# Patient Record
Sex: MALE | Race: WHITE | NOT HISPANIC OR LATINO | Employment: FULL TIME | URBAN - METROPOLITAN AREA
[De-identification: names, ages, dates, MRNs, and addresses within clinical notes are randomized per-mention and may not be internally consistent; named-entity substitution may affect disease eponyms.]

---

## 2017-05-12 ENCOUNTER — HOSPITAL ENCOUNTER (OUTPATIENT)
Facility: HOSPITAL | Age: 42
Setting detail: OBSERVATION
Discharge: HOME | End: 2017-05-15
Attending: EMERGENCY MEDICINE | Admitting: HOSPITALIST
Payer: COMMERCIAL

## 2017-05-12 DIAGNOSIS — K86.3 PANCREATIC PSEUDOCYST: ICD-10-CM

## 2017-05-12 DIAGNOSIS — R10.13 EPIGASTRIC ABDOMINAL PAIN: Primary | ICD-10-CM

## 2017-05-12 DIAGNOSIS — R16.0 LIVER MASS: ICD-10-CM

## 2017-05-13 ENCOUNTER — APPOINTMENT (EMERGENCY)
Dept: RADIOLOGY | Facility: HOSPITAL | Age: 42
End: 2017-05-13
Payer: COMMERCIAL

## 2017-05-13 PROBLEM — K86.0 CHRONIC ALCOHOLIC PANCREATITIS (HCC): Status: ACTIVE | Noted: 2017-05-13

## 2017-05-13 PROBLEM — K86.3 PANCREATIC PSEUDOCYST: Status: ACTIVE | Noted: 2017-05-13

## 2017-05-13 PROBLEM — R10.9 ABDOMINAL PAIN: Status: ACTIVE | Noted: 2017-05-13

## 2017-05-13 PROBLEM — R16.0 LIVER MASS: Status: ACTIVE | Noted: 2017-05-13

## 2017-05-13 LAB
ALBUMIN SERPL BCP-MCNC: 4.1 G/DL (ref 3.5–5)
ALP SERPL-CCNC: 111 U/L (ref 46–116)
ALT SERPL W P-5'-P-CCNC: 80 U/L (ref 12–78)
ANION GAP SERPL CALCULATED.3IONS-SCNC: 5 MMOL/L (ref 4–13)
ANION GAP SERPL CALCULATED.3IONS-SCNC: 8 MMOL/L (ref 4–13)
AST SERPL W P-5'-P-CCNC: 31 U/L (ref 5–45)
BASOPHILS # BLD AUTO: 0.03 THOUSANDS/ΜL (ref 0–0.1)
BASOPHILS NFR BLD AUTO: 1 % (ref 0–1)
BILIRUB SERPL-MCNC: 0.31 MG/DL (ref 0.2–1)
BUN SERPL-MCNC: 14 MG/DL (ref 5–25)
BUN SERPL-MCNC: 17 MG/DL (ref 5–25)
CALCIUM SERPL-MCNC: 8.9 MG/DL (ref 8.3–10.1)
CALCIUM SERPL-MCNC: 9.2 MG/DL (ref 8.3–10.1)
CHLORIDE SERPL-SCNC: 104 MMOL/L (ref 100–108)
CHLORIDE SERPL-SCNC: 106 MMOL/L (ref 100–108)
CO2 SERPL-SCNC: 26 MMOL/L (ref 21–32)
CO2 SERPL-SCNC: 30 MMOL/L (ref 21–32)
CREAT SERPL-MCNC: 0.79 MG/DL (ref 0.6–1.3)
CREAT SERPL-MCNC: 0.9 MG/DL (ref 0.6–1.3)
EOSINOPHIL # BLD AUTO: 0.41 THOUSAND/ΜL (ref 0–0.61)
EOSINOPHIL NFR BLD AUTO: 7 % (ref 0–6)
ERYTHROCYTE [DISTWIDTH] IN BLOOD BY AUTOMATED COUNT: 13.2 % (ref 11.6–15.1)
ERYTHROCYTE [DISTWIDTH] IN BLOOD BY AUTOMATED COUNT: 13.3 % (ref 11.6–15.1)
GFR SERPL CREATININE-BSD FRML MDRD: >60 ML/MIN/1.73SQ M
GFR SERPL CREATININE-BSD FRML MDRD: >60 ML/MIN/1.73SQ M
GLUCOSE P FAST SERPL-MCNC: 106 MG/DL (ref 65–99)
GLUCOSE SERPL-MCNC: 106 MG/DL (ref 65–140)
GLUCOSE SERPL-MCNC: 117 MG/DL (ref 65–140)
HCT VFR BLD AUTO: 43.7 % (ref 36.5–49.3)
HCT VFR BLD AUTO: 46 % (ref 36.5–49.3)
HGB BLD-MCNC: 14.5 G/DL (ref 12–17)
HGB BLD-MCNC: 15.5 G/DL (ref 12–17)
LIPASE SERPL-CCNC: 86 U/L (ref 73–393)
LYMPHOCYTES # BLD AUTO: 1.15 THOUSANDS/ΜL (ref 0.6–4.47)
LYMPHOCYTES NFR BLD AUTO: 20 % (ref 14–44)
MAGNESIUM SERPL-MCNC: 2.2 MG/DL (ref 1.6–2.6)
MCH RBC QN AUTO: 26.9 PG (ref 26.8–34.3)
MCH RBC QN AUTO: 27.5 PG (ref 26.8–34.3)
MCHC RBC AUTO-ENTMCNC: 33.2 G/DL (ref 31.4–37.4)
MCHC RBC AUTO-ENTMCNC: 33.7 G/DL (ref 31.4–37.4)
MCV RBC AUTO: 81 FL (ref 82–98)
MCV RBC AUTO: 82 FL (ref 82–98)
MONOCYTES # BLD AUTO: 0.41 THOUSAND/ΜL (ref 0.17–1.22)
MONOCYTES NFR BLD AUTO: 7 % (ref 4–12)
NEUTROPHILS # BLD AUTO: 3.61 THOUSANDS/ΜL (ref 1.85–7.62)
NEUTS SEG NFR BLD AUTO: 65 % (ref 43–75)
NRBC BLD AUTO-RTO: 0 /100 WBCS
PHOSPHATE SERPL-MCNC: 3.1 MG/DL (ref 2.7–4.5)
PLATELET # BLD AUTO: 162 THOUSANDS/UL (ref 149–390)
PLATELET # BLD AUTO: 163 THOUSANDS/UL (ref 149–390)
PLATELET # BLD AUTO: 175 THOUSANDS/UL (ref 149–390)
PMV BLD AUTO: 10.2 FL (ref 8.9–12.7)
PMV BLD AUTO: 10.6 FL (ref 8.9–12.7)
PMV BLD AUTO: 10.9 FL (ref 8.9–12.7)
POTASSIUM SERPL-SCNC: 3.9 MMOL/L (ref 3.5–5.3)
POTASSIUM SERPL-SCNC: 4.3 MMOL/L (ref 3.5–5.3)
PROT SERPL-MCNC: 7.8 G/DL (ref 6.4–8.2)
RBC # BLD AUTO: 5.4 MILLION/UL (ref 3.88–5.62)
RBC # BLD AUTO: 5.64 MILLION/UL (ref 3.88–5.62)
SODIUM SERPL-SCNC: 139 MMOL/L (ref 136–145)
SODIUM SERPL-SCNC: 140 MMOL/L (ref 136–145)
WBC # BLD AUTO: 4.92 THOUSAND/UL (ref 4.31–10.16)
WBC # BLD AUTO: 5.64 THOUSAND/UL (ref 4.31–10.16)

## 2017-05-13 PROCEDURE — 86301 IMMUNOASSAY TUMOR CA 19-9: CPT | Performed by: HOSPITALIST

## 2017-05-13 PROCEDURE — A9270 NON-COVERED ITEM OR SERVICE: HCPCS | Performed by: EMERGENCY MEDICINE

## 2017-05-13 PROCEDURE — 36415 COLL VENOUS BLD VENIPUNCTURE: CPT | Performed by: EMERGENCY MEDICINE

## 2017-05-13 PROCEDURE — 83690 ASSAY OF LIPASE: CPT | Performed by: EMERGENCY MEDICINE

## 2017-05-13 PROCEDURE — 83735 ASSAY OF MAGNESIUM: CPT | Performed by: HOSPITALIST

## 2017-05-13 PROCEDURE — A9270 NON-COVERED ITEM OR SERVICE: HCPCS | Performed by: HOSPITALIST

## 2017-05-13 PROCEDURE — 74177 CT ABD & PELVIS W/CONTRAST: CPT

## 2017-05-13 PROCEDURE — 85049 AUTOMATED PLATELET COUNT: CPT | Performed by: HOSPITALIST

## 2017-05-13 PROCEDURE — 94760 N-INVAS EAR/PLS OXIMETRY 1: CPT

## 2017-05-13 PROCEDURE — 85027 COMPLETE CBC AUTOMATED: CPT | Performed by: HOSPITALIST

## 2017-05-13 PROCEDURE — C9113 INJ PANTOPRAZOLE SODIUM, VIA: HCPCS | Performed by: HOSPITALIST

## 2017-05-13 PROCEDURE — 96360 HYDRATION IV INFUSION INIT: CPT

## 2017-05-13 PROCEDURE — 85025 COMPLETE CBC W/AUTO DIFF WBC: CPT | Performed by: EMERGENCY MEDICINE

## 2017-05-13 PROCEDURE — 84100 ASSAY OF PHOSPHORUS: CPT | Performed by: HOSPITALIST

## 2017-05-13 PROCEDURE — 80048 BASIC METABOLIC PNL TOTAL CA: CPT | Performed by: HOSPITALIST

## 2017-05-13 PROCEDURE — 80053 COMPREHEN METABOLIC PANEL: CPT | Performed by: EMERGENCY MEDICINE

## 2017-05-13 PROCEDURE — 99285 EMERGENCY DEPT VISIT HI MDM: CPT

## 2017-05-13 RX ORDER — MAGNESIUM HYDROXIDE/ALUMINUM HYDROXICE/SIMETHICONE 120; 1200; 1200 MG/30ML; MG/30ML; MG/30ML
30 SUSPENSION ORAL ONCE
Status: COMPLETED | OUTPATIENT
Start: 2017-05-13 | End: 2017-05-13

## 2017-05-13 RX ORDER — DOCUSATE SODIUM 100 MG/1
100 CAPSULE, LIQUID FILLED ORAL 2 TIMES DAILY
Status: DISCONTINUED | OUTPATIENT
Start: 2017-05-13 | End: 2017-05-15 | Stop reason: HOSPADM

## 2017-05-13 RX ORDER — SODIUM CHLORIDE 9 MG/ML
90 INJECTION, SOLUTION INTRAVENOUS CONTINUOUS
Status: DISCONTINUED | OUTPATIENT
Start: 2017-05-13 | End: 2017-05-14

## 2017-05-13 RX ORDER — ACETAMINOPHEN 325 MG/1
650 TABLET ORAL EVERY 6 HOURS PRN
Status: DISCONTINUED | OUTPATIENT
Start: 2017-05-13 | End: 2017-05-15 | Stop reason: HOSPADM

## 2017-05-13 RX ORDER — PANTOPRAZOLE SODIUM 40 MG/1
40 INJECTION, POWDER, FOR SOLUTION INTRAVENOUS EVERY 12 HOURS SCHEDULED
Status: DISCONTINUED | OUTPATIENT
Start: 2017-05-13 | End: 2017-05-15 | Stop reason: HOSPADM

## 2017-05-13 RX ORDER — SENNOSIDES 8.6 MG
1 TABLET ORAL DAILY
Status: DISCONTINUED | OUTPATIENT
Start: 2017-05-13 | End: 2017-05-15 | Stop reason: HOSPADM

## 2017-05-13 RX ADMIN — ALUMINUM HYDROXIDE, MAGNESIUM HYDROXIDE, AND SIMETHICONE 30 ML: 200; 200; 20 SUSPENSION ORAL at 02:13

## 2017-05-13 RX ADMIN — HYDROMORPHONE HYDROCHLORIDE 0.5 MG: 1 INJECTION, SOLUTION INTRAMUSCULAR; INTRAVENOUS; SUBCUTANEOUS at 19:36

## 2017-05-13 RX ADMIN — DOCUSATE SODIUM 100 MG: 100 CAPSULE, LIQUID FILLED ORAL at 09:19

## 2017-05-13 RX ADMIN — HYDROMORPHONE HYDROCHLORIDE 0.5 MG: 1 INJECTION, SOLUTION INTRAMUSCULAR; INTRAVENOUS; SUBCUTANEOUS at 13:27

## 2017-05-13 RX ADMIN — PANTOPRAZOLE SODIUM 40 MG: 40 INJECTION, POWDER, FOR SOLUTION INTRAVENOUS at 22:07

## 2017-05-13 RX ADMIN — ENOXAPARIN SODIUM 40 MG: 40 INJECTION SUBCUTANEOUS at 09:19

## 2017-05-13 RX ADMIN — SODIUM CHLORIDE 90 ML/HR: 0.9 INJECTION, SOLUTION INTRAVENOUS at 06:27

## 2017-05-13 RX ADMIN — SODIUM CHLORIDE 90 ML/HR: 0.9 INJECTION, SOLUTION INTRAVENOUS at 17:05

## 2017-05-13 RX ADMIN — PANTOPRAZOLE SODIUM 40 MG: 40 INJECTION, POWDER, FOR SOLUTION INTRAVENOUS at 09:19

## 2017-05-13 RX ADMIN — DOCUSATE SODIUM 100 MG: 100 CAPSULE, LIQUID FILLED ORAL at 17:56

## 2017-05-13 RX ADMIN — LIDOCAINE HYDROCHLORIDE 10 ML: 20 SOLUTION ORAL; TOPICAL at 02:13

## 2017-05-13 RX ADMIN — IOHEXOL 100 ML: 350 INJECTION, SOLUTION INTRAVENOUS at 01:25

## 2017-05-13 RX ADMIN — HYDROMORPHONE HYDROCHLORIDE 0.5 MG: 1 INJECTION, SOLUTION INTRAMUSCULAR; INTRAVENOUS; SUBCUTANEOUS at 07:48

## 2017-05-13 RX ADMIN — SENNOSIDES 8.6 MG: 8.6 TABLET, FILM COATED ORAL at 09:19

## 2017-05-13 RX ADMIN — HYDROMORPHONE HYDROCHLORIDE 0.5 MG: 1 INJECTION, SOLUTION INTRAMUSCULAR; INTRAVENOUS; SUBCUTANEOUS at 23:48

## 2017-05-13 RX ADMIN — SODIUM CHLORIDE 1000 ML: 0.9 INJECTION, SOLUTION INTRAVENOUS at 00:21

## 2017-05-14 LAB
ALBUMIN SERPL BCP-MCNC: 3.5 G/DL (ref 3.5–5)
ALP SERPL-CCNC: 98 U/L (ref 46–116)
ALT SERPL W P-5'-P-CCNC: 84 U/L (ref 12–78)
ANION GAP SERPL CALCULATED.3IONS-SCNC: 6 MMOL/L (ref 4–13)
AST SERPL W P-5'-P-CCNC: 32 U/L (ref 5–45)
BASOPHILS # BLD AUTO: 0.01 THOUSANDS/ΜL (ref 0–0.1)
BASOPHILS NFR BLD AUTO: 0 % (ref 0–1)
BILIRUB SERPL-MCNC: 0.29 MG/DL (ref 0.2–1)
BUN SERPL-MCNC: 11 MG/DL (ref 5–25)
CALCIUM SERPL-MCNC: 8.5 MG/DL (ref 8.3–10.1)
CANCER AG19-9 SERPL-ACNC: 13 U/ML (ref 0–35)
CHLORIDE SERPL-SCNC: 106 MMOL/L (ref 100–108)
CO2 SERPL-SCNC: 28 MMOL/L (ref 21–32)
CREAT SERPL-MCNC: 0.8 MG/DL (ref 0.6–1.3)
EOSINOPHIL # BLD AUTO: 0.33 THOUSAND/ΜL (ref 0–0.61)
EOSINOPHIL NFR BLD AUTO: 8 % (ref 0–6)
ERYTHROCYTE [DISTWIDTH] IN BLOOD BY AUTOMATED COUNT: 13.1 % (ref 11.6–15.1)
GFR SERPL CREATININE-BSD FRML MDRD: >60 ML/MIN/1.73SQ M
GLUCOSE SERPL-MCNC: 142 MG/DL (ref 65–140)
HAV IGM SER QL: NORMAL
HBV CORE IGM SER QL: NORMAL
HBV SURFACE AG SER QL: NORMAL
HCT VFR BLD AUTO: 42.2 % (ref 36.5–49.3)
HCV AB SER QL: NORMAL
HGB BLD-MCNC: 14.1 G/DL (ref 12–17)
LIPASE SERPL-CCNC: 77 U/L (ref 73–393)
LYMPHOCYTES # BLD AUTO: 1.04 THOUSANDS/ΜL (ref 0.6–4.47)
LYMPHOCYTES NFR BLD AUTO: 25 % (ref 14–44)
MCH RBC QN AUTO: 27.3 PG (ref 26.8–34.3)
MCHC RBC AUTO-ENTMCNC: 33.4 G/DL (ref 31.4–37.4)
MCV RBC AUTO: 82 FL (ref 82–98)
MONOCYTES # BLD AUTO: 0.35 THOUSAND/ΜL (ref 0.17–1.22)
MONOCYTES NFR BLD AUTO: 8 % (ref 4–12)
NEUTROPHILS # BLD AUTO: 2.44 THOUSANDS/ΜL (ref 1.85–7.62)
NEUTS SEG NFR BLD AUTO: 59 % (ref 43–75)
NRBC BLD AUTO-RTO: 0 /100 WBCS
PLATELET # BLD AUTO: 152 THOUSANDS/UL (ref 149–390)
PMV BLD AUTO: 10.8 FL (ref 8.9–12.7)
POTASSIUM SERPL-SCNC: 3.9 MMOL/L (ref 3.5–5.3)
PROT SERPL-MCNC: 6.7 G/DL (ref 6.4–8.2)
RBC # BLD AUTO: 5.17 MILLION/UL (ref 3.88–5.62)
SODIUM SERPL-SCNC: 140 MMOL/L (ref 136–145)
WBC # BLD AUTO: 4.19 THOUSAND/UL (ref 4.31–10.16)

## 2017-05-14 PROCEDURE — 80053 COMPREHEN METABOLIC PANEL: CPT | Performed by: INTERNAL MEDICINE

## 2017-05-14 PROCEDURE — 85025 COMPLETE CBC W/AUTO DIFF WBC: CPT | Performed by: INTERNAL MEDICINE

## 2017-05-14 PROCEDURE — 82105 ALPHA-FETOPROTEIN SERUM: CPT | Performed by: PHYSICIAN ASSISTANT

## 2017-05-14 PROCEDURE — A9270 NON-COVERED ITEM OR SERVICE: HCPCS | Performed by: HOSPITALIST

## 2017-05-14 PROCEDURE — 80074 ACUTE HEPATITIS PANEL: CPT | Performed by: PHYSICIAN ASSISTANT

## 2017-05-14 PROCEDURE — 83690 ASSAY OF LIPASE: CPT | Performed by: INTERNAL MEDICINE

## 2017-05-14 PROCEDURE — A9270 NON-COVERED ITEM OR SERVICE: HCPCS | Performed by: PHYSICIAN ASSISTANT

## 2017-05-14 PROCEDURE — C9113 INJ PANTOPRAZOLE SODIUM, VIA: HCPCS | Performed by: HOSPITALIST

## 2017-05-14 RX ORDER — OXYCODONE HYDROCHLORIDE 5 MG/1
5 TABLET ORAL EVERY 4 HOURS PRN
Status: DISCONTINUED | OUTPATIENT
Start: 2017-05-14 | End: 2017-05-15 | Stop reason: HOSPADM

## 2017-05-14 RX ORDER — OXYCODONE HYDROCHLORIDE 10 MG/1
10 TABLET ORAL EVERY 4 HOURS PRN
Status: DISCONTINUED | OUTPATIENT
Start: 2017-05-14 | End: 2017-05-15 | Stop reason: HOSPADM

## 2017-05-14 RX ADMIN — HYDROMORPHONE HYDROCHLORIDE 0.5 MG: 1 INJECTION, SOLUTION INTRAMUSCULAR; INTRAVENOUS; SUBCUTANEOUS at 08:26

## 2017-05-14 RX ADMIN — HYDROMORPHONE HYDROCHLORIDE 0.5 MG: 1 INJECTION, SOLUTION INTRAMUSCULAR; INTRAVENOUS; SUBCUTANEOUS at 04:28

## 2017-05-14 RX ADMIN — SENNOSIDES 8.6 MG: 8.6 TABLET, FILM COATED ORAL at 08:25

## 2017-05-14 RX ADMIN — DOCUSATE SODIUM 100 MG: 100 CAPSULE, LIQUID FILLED ORAL at 08:25

## 2017-05-14 RX ADMIN — ENOXAPARIN SODIUM 40 MG: 40 INJECTION SUBCUTANEOUS at 08:25

## 2017-05-14 RX ADMIN — OXYCODONE HYDROCHLORIDE 10 MG: 10 TABLET ORAL at 12:20

## 2017-05-14 RX ADMIN — PANTOPRAZOLE SODIUM 40 MG: 40 INJECTION, POWDER, FOR SOLUTION INTRAVENOUS at 20:30

## 2017-05-14 RX ADMIN — PANTOPRAZOLE SODIUM 40 MG: 40 INJECTION, POWDER, FOR SOLUTION INTRAVENOUS at 08:31

## 2017-05-14 RX ADMIN — OXYCODONE HYDROCHLORIDE 10 MG: 10 TABLET ORAL at 17:08

## 2017-05-15 VITALS
SYSTOLIC BLOOD PRESSURE: 129 MMHG | TEMPERATURE: 98.2 F | HEART RATE: 80 BPM | WEIGHT: 210.32 LBS | OXYGEN SATURATION: 92 % | BODY MASS INDEX: 30.11 KG/M2 | HEIGHT: 70 IN | DIASTOLIC BLOOD PRESSURE: 56 MMHG | RESPIRATION RATE: 18 BRPM

## 2017-05-15 LAB
ALBUMIN SERPL BCP-MCNC: 3.8 G/DL (ref 3.5–5)
ALP SERPL-CCNC: 168 U/L (ref 46–116)
ALT SERPL W P-5'-P-CCNC: 253 U/L (ref 12–78)
ANION GAP SERPL CALCULATED.3IONS-SCNC: 8 MMOL/L (ref 4–13)
AST SERPL W P-5'-P-CCNC: 184 U/L (ref 5–45)
BILIRUB SERPL-MCNC: 0.76 MG/DL (ref 0.2–1)
BUN SERPL-MCNC: 13 MG/DL (ref 5–25)
CALCIUM SERPL-MCNC: 8.8 MG/DL (ref 8.3–10.1)
CHLORIDE SERPL-SCNC: 106 MMOL/L (ref 100–108)
CO2 SERPL-SCNC: 27 MMOL/L (ref 21–32)
CREAT SERPL-MCNC: 1.06 MG/DL (ref 0.6–1.3)
GFR SERPL CREATININE-BSD FRML MDRD: >60 ML/MIN/1.73SQ M
GLUCOSE P FAST SERPL-MCNC: 108 MG/DL (ref 65–99)
GLUCOSE SERPL-MCNC: 108 MG/DL (ref 65–140)
POTASSIUM SERPL-SCNC: 4 MMOL/L (ref 3.5–5.3)
PROT SERPL-MCNC: 7.3 G/DL (ref 6.4–8.2)
SODIUM SERPL-SCNC: 141 MMOL/L (ref 136–145)

## 2017-05-15 PROCEDURE — C9113 INJ PANTOPRAZOLE SODIUM, VIA: HCPCS | Performed by: HOSPITALIST

## 2017-05-15 PROCEDURE — A9270 NON-COVERED ITEM OR SERVICE: HCPCS | Performed by: PHYSICIAN ASSISTANT

## 2017-05-15 PROCEDURE — 80053 COMPREHEN METABOLIC PANEL: CPT | Performed by: PHYSICIAN ASSISTANT

## 2017-05-15 RX ORDER — ONDANSETRON 4 MG/1
4 TABLET, FILM COATED ORAL EVERY 8 HOURS PRN
Qty: 15 TABLET | Refills: 0 | Status: SHIPPED | OUTPATIENT
Start: 2017-05-15 | End: 2017-05-29

## 2017-05-15 RX ORDER — OXYCODONE HYDROCHLORIDE 5 MG/1
5 TABLET ORAL EVERY 6 HOURS PRN
Qty: 12 TABLET | Refills: 0 | Status: SHIPPED | OUTPATIENT
Start: 2017-05-15 | End: 2017-05-29

## 2017-05-15 RX ADMIN — OXYCODONE HYDROCHLORIDE 10 MG: 10 TABLET ORAL at 00:02

## 2017-05-15 RX ADMIN — ENOXAPARIN SODIUM 40 MG: 40 INJECTION SUBCUTANEOUS at 09:00

## 2017-05-15 RX ADMIN — OXYCODONE HYDROCHLORIDE 10 MG: 10 TABLET ORAL at 09:00

## 2017-05-15 RX ADMIN — PANTOPRAZOLE SODIUM 40 MG: 40 INJECTION, POWDER, FOR SOLUTION INTRAVENOUS at 07:44

## 2017-05-16 LAB — AFP-TM SERPL-MCNC: 4.6 NG/ML (ref 0–8.3)

## 2017-05-29 ENCOUNTER — HOSPITAL ENCOUNTER (EMERGENCY)
Facility: HOSPITAL | Age: 42
Discharge: HOME | End: 2017-05-29
Attending: EMERGENCY MEDICINE | Admitting: EMERGENCY MEDICINE
Payer: COMMERCIAL

## 2017-05-29 VITALS
WEIGHT: 205 LBS | BODY MASS INDEX: 29.41 KG/M2 | SYSTOLIC BLOOD PRESSURE: 151 MMHG | DIASTOLIC BLOOD PRESSURE: 102 MMHG | HEART RATE: 85 BPM | RESPIRATION RATE: 20 BRPM | TEMPERATURE: 97.2 F | OXYGEN SATURATION: 97 %

## 2017-05-29 DIAGNOSIS — R10.9 ABDOMINAL PAIN: Primary | ICD-10-CM

## 2017-05-29 DIAGNOSIS — K86.3 PANCREATIC PSEUDOCYST: ICD-10-CM

## 2017-05-29 LAB
ALBUMIN SERPL BCP-MCNC: 4.2 G/DL (ref 3.5–5)
ALP SERPL-CCNC: 118 U/L (ref 46–116)
ALT SERPL W P-5'-P-CCNC: 100 U/L (ref 12–78)
ANION GAP SERPL CALCULATED.3IONS-SCNC: 6 MMOL/L (ref 4–13)
AST SERPL W P-5'-P-CCNC: 36 U/L (ref 5–45)
BASOPHILS # BLD AUTO: 0.04 THOUSANDS/ΜL (ref 0–0.1)
BASOPHILS NFR BLD AUTO: 1 % (ref 0–1)
BILIRUB SERPL-MCNC: 0.36 MG/DL (ref 0.2–1)
BUN SERPL-MCNC: 9 MG/DL (ref 5–25)
CALCIUM SERPL-MCNC: 9.6 MG/DL (ref 8.3–10.1)
CHLORIDE SERPL-SCNC: 105 MMOL/L (ref 100–108)
CO2 SERPL-SCNC: 30 MMOL/L (ref 21–32)
CREAT SERPL-MCNC: 1.04 MG/DL (ref 0.6–1.3)
EOSINOPHIL # BLD AUTO: 0.37 THOUSAND/ΜL (ref 0–0.61)
EOSINOPHIL NFR BLD AUTO: 7 % (ref 0–6)
ERYTHROCYTE [DISTWIDTH] IN BLOOD BY AUTOMATED COUNT: 13 % (ref 11.6–15.1)
GFR SERPL CREATININE-BSD FRML MDRD: >60 ML/MIN/1.73SQ M
GLUCOSE SERPL-MCNC: 112 MG/DL (ref 65–140)
HCT VFR BLD AUTO: 44.7 % (ref 36.5–49.3)
HGB BLD-MCNC: 15.2 G/DL (ref 12–17)
LIPASE SERPL-CCNC: 159 U/L (ref 73–393)
LYMPHOCYTES # BLD AUTO: 1.11 THOUSANDS/ΜL (ref 0.6–4.47)
LYMPHOCYTES NFR BLD AUTO: 20 % (ref 14–44)
MCH RBC QN AUTO: 27.5 PG (ref 26.8–34.3)
MCHC RBC AUTO-ENTMCNC: 34 G/DL (ref 31.4–37.4)
MCV RBC AUTO: 81 FL (ref 82–98)
MONOCYTES # BLD AUTO: 0.53 THOUSAND/ΜL (ref 0.17–1.22)
MONOCYTES NFR BLD AUTO: 9 % (ref 4–12)
NEUTROPHILS # BLD AUTO: 3.61 THOUSANDS/ΜL (ref 1.85–7.62)
NEUTS SEG NFR BLD AUTO: 63 % (ref 43–75)
NRBC BLD AUTO-RTO: 0 /100 WBCS
PLATELET # BLD AUTO: 163 THOUSANDS/UL (ref 149–390)
PMV BLD AUTO: 11 FL (ref 8.9–12.7)
POTASSIUM SERPL-SCNC: 3.5 MMOL/L (ref 3.5–5.3)
PROT SERPL-MCNC: 8 G/DL (ref 6.4–8.2)
RBC # BLD AUTO: 5.52 MILLION/UL (ref 3.88–5.62)
SODIUM SERPL-SCNC: 141 MMOL/L (ref 136–145)
WBC # BLD AUTO: 5.69 THOUSAND/UL (ref 4.31–10.16)

## 2017-05-29 PROCEDURE — 85025 COMPLETE CBC W/AUTO DIFF WBC: CPT

## 2017-05-29 PROCEDURE — 96375 TX/PRO/DX INJ NEW DRUG ADDON: CPT

## 2017-05-29 PROCEDURE — 36415 COLL VENOUS BLD VENIPUNCTURE: CPT

## 2017-05-29 PROCEDURE — 99284 EMERGENCY DEPT VISIT MOD MDM: CPT

## 2017-05-29 PROCEDURE — A9270 NON-COVERED ITEM OR SERVICE: HCPCS | Performed by: EMERGENCY MEDICINE

## 2017-05-29 PROCEDURE — 96374 THER/PROPH/DIAG INJ IV PUSH: CPT

## 2017-05-29 PROCEDURE — 83690 ASSAY OF LIPASE: CPT | Performed by: EMERGENCY MEDICINE

## 2017-05-29 PROCEDURE — 80053 COMPREHEN METABOLIC PANEL: CPT

## 2017-05-29 RX ORDER — MAGNESIUM HYDROXIDE/ALUMINUM HYDROXICE/SIMETHICONE 120; 1200; 1200 MG/30ML; MG/30ML; MG/30ML
30 SUSPENSION ORAL ONCE
Status: COMPLETED | OUTPATIENT
Start: 2017-05-29 | End: 2017-05-29

## 2017-05-29 RX ORDER — OXYCODONE HYDROCHLORIDE 5 MG/1
5 TABLET ORAL EVERY 4 HOURS PRN
Qty: 6 TABLET | Refills: 0 | Status: SHIPPED | OUTPATIENT
Start: 2017-05-29 | End: 2017-07-03

## 2017-05-29 RX ORDER — ONDANSETRON 2 MG/ML
4 INJECTION INTRAMUSCULAR; INTRAVENOUS ONCE AS NEEDED
Status: COMPLETED | OUTPATIENT
Start: 2017-05-29 | End: 2017-05-29

## 2017-05-29 RX ADMIN — HYDROMORPHONE HYDROCHLORIDE 0.5 MG: 1 INJECTION, SOLUTION INTRAMUSCULAR; INTRAVENOUS; SUBCUTANEOUS at 22:36

## 2017-05-29 RX ADMIN — LIDOCAINE HYDROCHLORIDE 15 ML: 20 SOLUTION ORAL; TOPICAL at 22:21

## 2017-05-29 RX ADMIN — ONDANSETRON 4 MG: 2 INJECTION INTRAMUSCULAR; INTRAVENOUS at 21:56

## 2017-05-29 RX ADMIN — ALUMINUM HYDROXIDE, MAGNESIUM HYDROXIDE, AND SIMETHICONE 30 ML: 200; 200; 20 SUSPENSION ORAL at 22:21

## 2017-05-30 ENCOUNTER — ALLSCRIPTS OFFICE VISIT (OUTPATIENT)
Dept: OTHER | Facility: OTHER | Age: 42
End: 2017-05-30

## 2017-07-02 ENCOUNTER — HOSPITAL ENCOUNTER (OUTPATIENT)
Facility: HOSPITAL | Age: 42
Setting detail: OBSERVATION
Discharge: HOME | End: 2017-07-05
Attending: EMERGENCY MEDICINE | Admitting: HOSPITALIST
Payer: COMMERCIAL

## 2017-07-02 DIAGNOSIS — K85.90 PANCREATITIS: Primary | ICD-10-CM

## 2017-07-02 DIAGNOSIS — R10.9 ABDOMINAL PAIN: ICD-10-CM

## 2017-07-02 DIAGNOSIS — K86.3 PANCREATIC PSEUDOCYST: ICD-10-CM

## 2017-07-02 LAB
ALBUMIN SERPL BCP-MCNC: 4.5 G/DL (ref 3.5–5)
ALP SERPL-CCNC: 124 U/L (ref 46–116)
ALT SERPL W P-5'-P-CCNC: 71 U/L (ref 12–78)
ANION GAP SERPL CALCULATED.3IONS-SCNC: 9 MMOL/L (ref 4–13)
AST SERPL W P-5'-P-CCNC: 26 U/L (ref 5–45)
BACTERIA UR QL AUTO: ABNORMAL /HPF
BASOPHILS # BLD AUTO: 0.03 THOUSANDS/ΜL (ref 0–0.1)
BASOPHILS NFR BLD AUTO: 1 % (ref 0–1)
BILIRUB SERPL-MCNC: 0.4 MG/DL (ref 0.2–1)
BILIRUB UR QL STRIP: NEGATIVE
BUN SERPL-MCNC: 16 MG/DL (ref 5–25)
CALCIUM SERPL-MCNC: 9.5 MG/DL (ref 8.3–10.1)
CHLORIDE SERPL-SCNC: 104 MMOL/L (ref 100–108)
CLARITY UR: CLEAR
CO2 SERPL-SCNC: 24 MMOL/L (ref 21–32)
COLOR UR: YELLOW
COLOR, POC: NORMAL
CREAT SERPL-MCNC: 1.04 MG/DL (ref 0.6–1.3)
EOSINOPHIL # BLD AUTO: 0.29 THOUSAND/ΜL (ref 0–0.61)
EOSINOPHIL NFR BLD AUTO: 5 % (ref 0–6)
ERYTHROCYTE [DISTWIDTH] IN BLOOD BY AUTOMATED COUNT: 13 % (ref 11.6–15.1)
GFR SERPL CREATININE-BSD FRML MDRD: >60 ML/MIN/1.73SQ M
GLUCOSE SERPL-MCNC: 206 MG/DL (ref 65–140)
GLUCOSE UR STRIP-MCNC: ABNORMAL MG/DL
HCT VFR BLD AUTO: 45.3 % (ref 36.5–49.3)
HGB BLD-MCNC: 15.9 G/DL (ref 12–17)
HGB UR QL STRIP.AUTO: ABNORMAL
HYALINE CASTS #/AREA URNS LPF: ABNORMAL /LPF
KETONES UR STRIP-MCNC: NEGATIVE MG/DL
LEUKOCYTE ESTERASE UR QL STRIP: NEGATIVE
LIPASE SERPL-CCNC: 422 U/L (ref 73–393)
LYMPHOCYTES # BLD AUTO: 1.14 THOUSANDS/ΜL (ref 0.6–4.47)
LYMPHOCYTES NFR BLD AUTO: 18 % (ref 14–44)
MCH RBC QN AUTO: 27.5 PG (ref 26.8–34.3)
MCHC RBC AUTO-ENTMCNC: 35.1 G/DL (ref 31.4–37.4)
MCV RBC AUTO: 78 FL (ref 82–98)
MONOCYTES # BLD AUTO: 0.49 THOUSAND/ΜL (ref 0.17–1.22)
MONOCYTES NFR BLD AUTO: 8 % (ref 4–12)
NEUTROPHILS # BLD AUTO: 4.21 THOUSANDS/ΜL (ref 1.85–7.62)
NEUTS SEG NFR BLD AUTO: 68 % (ref 43–75)
NITRITE UR QL STRIP: NEGATIVE
NON-SQ EPI CELLS URNS QL MICRO: ABNORMAL /HPF
NRBC BLD AUTO-RTO: 0 /100 WBCS
PH UR STRIP.AUTO: 5.5 [PH] (ref 4.5–8)
PLATELET # BLD AUTO: 207 THOUSANDS/UL (ref 149–390)
PMV BLD AUTO: 10.8 FL (ref 8.9–12.7)
POTASSIUM SERPL-SCNC: 3.5 MMOL/L (ref 3.5–5.3)
PROT SERPL-MCNC: 8.3 G/DL (ref 6.4–8.2)
PROT UR STRIP-MCNC: ABNORMAL MG/DL
RBC # BLD AUTO: 5.78 MILLION/UL (ref 3.88–5.62)
RBC #/AREA URNS AUTO: ABNORMAL /HPF
SODIUM SERPL-SCNC: 137 MMOL/L (ref 136–145)
SP GR UR STRIP.AUTO: 1.01 (ref 1–1.03)
UROBILINOGEN UR QL STRIP.AUTO: 0.2 E.U./DL
WBC # BLD AUTO: 6.2 THOUSAND/UL (ref 4.31–10.16)
WBC #/AREA URNS AUTO: ABNORMAL /HPF

## 2017-07-02 PROCEDURE — 81001 URINALYSIS AUTO W/SCOPE: CPT

## 2017-07-02 PROCEDURE — 96374 THER/PROPH/DIAG INJ IV PUSH: CPT

## 2017-07-02 PROCEDURE — 96375 TX/PRO/DX INJ NEW DRUG ADDON: CPT

## 2017-07-02 PROCEDURE — 81002 URINALYSIS NONAUTO W/O SCOPE: CPT | Performed by: EMERGENCY MEDICINE

## 2017-07-02 PROCEDURE — 96361 HYDRATE IV INFUSION ADD-ON: CPT

## 2017-07-02 PROCEDURE — 83690 ASSAY OF LIPASE: CPT | Performed by: EMERGENCY MEDICINE

## 2017-07-02 PROCEDURE — 36415 COLL VENOUS BLD VENIPUNCTURE: CPT | Performed by: EMERGENCY MEDICINE

## 2017-07-02 PROCEDURE — 80053 COMPREHEN METABOLIC PANEL: CPT | Performed by: EMERGENCY MEDICINE

## 2017-07-02 PROCEDURE — 85025 COMPLETE CBC W/AUTO DIFF WBC: CPT | Performed by: EMERGENCY MEDICINE

## 2017-07-02 RX ORDER — ONDANSETRON 2 MG/ML
4 INJECTION INTRAMUSCULAR; INTRAVENOUS ONCE
Status: COMPLETED | OUTPATIENT
Start: 2017-07-02 | End: 2017-07-02

## 2017-07-02 RX ADMIN — SODIUM CHLORIDE 1000 ML: 0.9 INJECTION, SOLUTION INTRAVENOUS at 23:13

## 2017-07-02 RX ADMIN — ONDANSETRON 4 MG: 2 INJECTION INTRAMUSCULAR; INTRAVENOUS at 23:10

## 2017-07-02 RX ADMIN — HYDROMORPHONE HYDROCHLORIDE 0.5 MG: 1 INJECTION, SOLUTION INTRAMUSCULAR; INTRAVENOUS; SUBCUTANEOUS at 23:10

## 2017-07-03 PROBLEM — R73.9 ELEVATED RANDOM BLOOD GLUCOSE LEVEL: Status: ACTIVE | Noted: 2017-07-03

## 2017-07-03 PROBLEM — E86.0 DEHYDRATION: Status: ACTIVE | Noted: 2017-07-03

## 2017-07-03 PROBLEM — K85.90 ACUTE ON CHRONIC PANCREATITIS (HCC): Status: ACTIVE | Noted: 2017-07-03

## 2017-07-03 PROBLEM — R73.09 ELEVATED RANDOM BLOOD GLUCOSE LEVEL: Status: ACTIVE | Noted: 2017-07-03

## 2017-07-03 PROBLEM — I10 ESSENTIAL HYPERTENSION: Status: ACTIVE | Noted: 2017-07-03

## 2017-07-03 PROBLEM — K86.1 ACUTE ON CHRONIC PANCREATITIS (HCC): Status: ACTIVE | Noted: 2017-07-03

## 2017-07-03 PROBLEM — R10.9 ABDOMINAL PAIN: Status: ACTIVE | Noted: 2017-07-03

## 2017-07-03 LAB
ANION GAP SERPL CALCULATED.3IONS-SCNC: 8 MMOL/L (ref 4–13)
BUN SERPL-MCNC: 14 MG/DL (ref 5–25)
CALCIUM SERPL-MCNC: 8.6 MG/DL (ref 8.3–10.1)
CHLORIDE SERPL-SCNC: 108 MMOL/L (ref 100–108)
CO2 SERPL-SCNC: 25 MMOL/L (ref 21–32)
CREAT SERPL-MCNC: 0.78 MG/DL (ref 0.6–1.3)
ERYTHROCYTE [DISTWIDTH] IN BLOOD BY AUTOMATED COUNT: 13 % (ref 11.6–15.1)
GFR SERPL CREATININE-BSD FRML MDRD: >60 ML/MIN/1.73SQ M
GLUCOSE SERPL-MCNC: 157 MG/DL (ref 65–140)
HCT VFR BLD AUTO: 39.5 % (ref 36.5–49.3)
HGB BLD-MCNC: 13.6 G/DL (ref 12–17)
LIPASE SERPL-CCNC: 450 U/L (ref 73–393)
MCH RBC QN AUTO: 27.1 PG (ref 26.8–34.3)
MCHC RBC AUTO-ENTMCNC: 34.4 G/DL (ref 31.4–37.4)
MCV RBC AUTO: 79 FL (ref 82–98)
PLATELET # BLD AUTO: 146 THOUSANDS/UL (ref 149–390)
PLATELET # BLD AUTO: 148 THOUSANDS/UL (ref 149–390)
PMV BLD AUTO: 10.8 FL (ref 8.9–12.7)
PMV BLD AUTO: 11.4 FL (ref 8.9–12.7)
POTASSIUM SERPL-SCNC: 3.6 MMOL/L (ref 3.5–5.3)
RBC # BLD AUTO: 5.01 MILLION/UL (ref 3.88–5.62)
SODIUM SERPL-SCNC: 141 MMOL/L (ref 136–145)
WBC # BLD AUTO: 4.09 THOUSAND/UL (ref 4.31–10.16)

## 2017-07-03 PROCEDURE — 85049 AUTOMATED PLATELET COUNT: CPT | Performed by: HOSPITALIST

## 2017-07-03 PROCEDURE — 96376 TX/PRO/DX INJ SAME DRUG ADON: CPT

## 2017-07-03 PROCEDURE — 83690 ASSAY OF LIPASE: CPT | Performed by: HOSPITALIST

## 2017-07-03 PROCEDURE — 96375 TX/PRO/DX INJ NEW DRUG ADDON: CPT

## 2017-07-03 PROCEDURE — 99285 EMERGENCY DEPT VISIT HI MDM: CPT

## 2017-07-03 PROCEDURE — 85027 COMPLETE CBC AUTOMATED: CPT | Performed by: HOSPITALIST

## 2017-07-03 PROCEDURE — 80048 BASIC METABOLIC PNL TOTAL CA: CPT | Performed by: HOSPITALIST

## 2017-07-03 PROCEDURE — C9113 INJ PANTOPRAZOLE SODIUM, VIA: HCPCS | Performed by: HOSPITALIST

## 2017-07-03 RX ORDER — PANTOPRAZOLE SODIUM 40 MG/1
40 INJECTION, POWDER, FOR SOLUTION INTRAVENOUS EVERY 12 HOURS SCHEDULED
Status: DISCONTINUED | OUTPATIENT
Start: 2017-07-03 | End: 2017-07-05 | Stop reason: HOSPADM

## 2017-07-03 RX ORDER — DOCUSATE SODIUM 100 MG/1
100 CAPSULE, LIQUID FILLED ORAL 2 TIMES DAILY
Status: DISCONTINUED | OUTPATIENT
Start: 2017-07-03 | End: 2017-07-05 | Stop reason: HOSPADM

## 2017-07-03 RX ORDER — SODIUM CHLORIDE 9 MG/ML
125 INJECTION, SOLUTION INTRAVENOUS CONTINUOUS
Status: DISPENSED | OUTPATIENT
Start: 2017-07-03 | End: 2017-07-03

## 2017-07-03 RX ORDER — KETOROLAC TROMETHAMINE 30 MG/ML
15 INJECTION, SOLUTION INTRAMUSCULAR; INTRAVENOUS ONCE
Status: COMPLETED | OUTPATIENT
Start: 2017-07-03 | End: 2017-07-03

## 2017-07-03 RX ORDER — KETOROLAC TROMETHAMINE 30 MG/ML
15 INJECTION, SOLUTION INTRAMUSCULAR; INTRAVENOUS EVERY 6 HOURS PRN
Status: DISCONTINUED | OUTPATIENT
Start: 2017-07-03 | End: 2017-07-03

## 2017-07-03 RX ORDER — ACETAMINOPHEN 325 MG/1
650 TABLET ORAL EVERY 6 HOURS PRN
Status: DISCONTINUED | OUTPATIENT
Start: 2017-07-03 | End: 2017-07-05 | Stop reason: HOSPADM

## 2017-07-03 RX ORDER — OXYCODONE HYDROCHLORIDE 10 MG/1
10 TABLET ORAL EVERY 4 HOURS PRN
Status: DISCONTINUED | OUTPATIENT
Start: 2017-07-03 | End: 2017-07-05 | Stop reason: HOSPADM

## 2017-07-03 RX ORDER — SENNOSIDES 8.6 MG
1 TABLET ORAL DAILY
Status: DISCONTINUED | OUTPATIENT
Start: 2017-07-03 | End: 2017-07-05 | Stop reason: HOSPADM

## 2017-07-03 RX ORDER — SODIUM CHLORIDE 9 MG/ML
125 INJECTION, SOLUTION INTRAVENOUS CONTINUOUS
Status: DISCONTINUED | OUTPATIENT
Start: 2017-07-03 | End: 2017-07-05 | Stop reason: HOSPADM

## 2017-07-03 RX ORDER — MAGNESIUM HYDROXIDE/ALUMINUM HYDROXICE/SIMETHICONE 120; 1200; 1200 MG/30ML; MG/30ML; MG/30ML
15 SUSPENSION ORAL ONCE
Status: COMPLETED | OUTPATIENT
Start: 2017-07-03 | End: 2017-07-03

## 2017-07-03 RX ORDER — KETOROLAC TROMETHAMINE 30 MG/ML
15 INJECTION, SOLUTION INTRAMUSCULAR; INTRAVENOUS EVERY 6 HOURS PRN
Status: DISCONTINUED | OUTPATIENT
Start: 2017-07-03 | End: 2017-07-05 | Stop reason: HOSPADM

## 2017-07-03 RX ORDER — ONDANSETRON 2 MG/ML
4 INJECTION INTRAMUSCULAR; INTRAVENOUS EVERY 6 HOURS PRN
Status: DISCONTINUED | OUTPATIENT
Start: 2017-07-03 | End: 2017-07-05 | Stop reason: HOSPADM

## 2017-07-03 RX ADMIN — KETOROLAC TROMETHAMINE 15 MG: 30 INJECTION, SOLUTION INTRAMUSCULAR at 17:26

## 2017-07-03 RX ADMIN — SODIUM CHLORIDE 125 ML/HR: 0.9 INJECTION, SOLUTION INTRAVENOUS at 03:25

## 2017-07-03 RX ADMIN — SENNOSIDES 8.6 MG: 8.6 TABLET, FILM COATED ORAL at 08:24

## 2017-07-03 RX ADMIN — OXYCODONE HYDROCHLORIDE 10 MG: 10 TABLET ORAL at 22:58

## 2017-07-03 RX ADMIN — ENOXAPARIN SODIUM 40 MG: 40 INJECTION SUBCUTANEOUS at 08:24

## 2017-07-03 RX ADMIN — HYDROMORPHONE HYDROCHLORIDE 0.5 MG: 1 INJECTION, SOLUTION INTRAMUSCULAR; INTRAVENOUS; SUBCUTANEOUS at 13:22

## 2017-07-03 RX ADMIN — LIDOCAINE HYDROCHLORIDE 10 ML: 20 SOLUTION ORAL; TOPICAL at 01:13

## 2017-07-03 RX ADMIN — PANTOPRAZOLE SODIUM 40 MG: 40 INJECTION, POWDER, FOR SOLUTION INTRAVENOUS at 08:23

## 2017-07-03 RX ADMIN — SODIUM CHLORIDE 1000 ML: 0.9 INJECTION, SOLUTION INTRAVENOUS at 02:20

## 2017-07-03 RX ADMIN — PANTOPRAZOLE SODIUM 40 MG: 40 INJECTION, POWDER, FOR SOLUTION INTRAVENOUS at 22:49

## 2017-07-03 RX ADMIN — HYDROMORPHONE HYDROCHLORIDE 0.5 MG: 1 INJECTION, SOLUTION INTRAMUSCULAR; INTRAVENOUS; SUBCUTANEOUS at 08:24

## 2017-07-03 RX ADMIN — SODIUM CHLORIDE 125 ML/HR: 0.9 INJECTION, SOLUTION INTRAVENOUS at 10:34

## 2017-07-03 RX ADMIN — HYDROMORPHONE HYDROCHLORIDE 0.5 MG: 1 INJECTION, SOLUTION INTRAMUSCULAR; INTRAVENOUS; SUBCUTANEOUS at 03:33

## 2017-07-03 RX ADMIN — KETOROLAC TROMETHAMINE 15 MG: 30 INJECTION, SOLUTION INTRAMUSCULAR at 01:17

## 2017-07-03 RX ADMIN — ALUMINUM HYDROXIDE, MAGNESIUM HYDROXIDE, AND SIMETHICONE 15 ML: 200; 200; 20 SUSPENSION ORAL at 01:12

## 2017-07-03 RX ADMIN — HYDROMORPHONE HYDROCHLORIDE 0.5 MG: 1 INJECTION, SOLUTION INTRAMUSCULAR; INTRAVENOUS; SUBCUTANEOUS at 19:45

## 2017-07-03 RX ADMIN — HYDROMORPHONE HYDROCHLORIDE 0.5 MG: 1 INJECTION, SOLUTION INTRAMUSCULAR; INTRAVENOUS; SUBCUTANEOUS at 00:32

## 2017-07-03 RX ADMIN — SODIUM CHLORIDE 125 ML/HR: 0.9 INJECTION, SOLUTION INTRAVENOUS at 19:45

## 2017-07-03 RX ADMIN — DOCUSATE SODIUM 100 MG: 100 CAPSULE, LIQUID FILLED ORAL at 08:24

## 2017-07-04 LAB
EST. AVERAGE GLUCOSE BLD GHB EST-MCNC: 148 MG/DL
HBA1C MFR BLD: 6.8 % (ref 4.2–6.3)

## 2017-07-04 PROCEDURE — C9113 INJ PANTOPRAZOLE SODIUM, VIA: HCPCS | Performed by: HOSPITALIST

## 2017-07-04 PROCEDURE — 83036 HEMOGLOBIN GLYCOSYLATED A1C: CPT | Performed by: HOSPITALIST

## 2017-07-04 RX ADMIN — PANTOPRAZOLE SODIUM 40 MG: 40 INJECTION, POWDER, FOR SOLUTION INTRAVENOUS at 21:34

## 2017-07-04 RX ADMIN — OXYCODONE HYDROCHLORIDE 10 MG: 10 TABLET ORAL at 08:36

## 2017-07-04 RX ADMIN — SODIUM CHLORIDE 125 ML/HR: 0.9 INJECTION, SOLUTION INTRAVENOUS at 16:18

## 2017-07-04 RX ADMIN — HYDROMORPHONE HYDROCHLORIDE 0.5 MG: 1 INJECTION, SOLUTION INTRAMUSCULAR; INTRAVENOUS; SUBCUTANEOUS at 02:51

## 2017-07-04 RX ADMIN — OXYCODONE HYDROCHLORIDE 10 MG: 10 TABLET ORAL at 21:34

## 2017-07-04 RX ADMIN — OXYCODONE HYDROCHLORIDE 10 MG: 10 TABLET ORAL at 13:37

## 2017-07-04 RX ADMIN — PANTOPRAZOLE SODIUM 40 MG: 40 INJECTION, POWDER, FOR SOLUTION INTRAVENOUS at 08:36

## 2017-07-04 RX ADMIN — ENOXAPARIN SODIUM 40 MG: 40 INJECTION SUBCUTANEOUS at 08:36

## 2017-07-04 RX ADMIN — SODIUM CHLORIDE 125 ML/HR: 0.9 INJECTION, SOLUTION INTRAVENOUS at 08:40

## 2017-07-05 VITALS
HEIGHT: 71 IN | TEMPERATURE: 98.2 F | WEIGHT: 218.26 LBS | OXYGEN SATURATION: 94 % | RESPIRATION RATE: 20 BRPM | HEART RATE: 75 BPM | SYSTOLIC BLOOD PRESSURE: 133 MMHG | DIASTOLIC BLOOD PRESSURE: 94 MMHG | BODY MASS INDEX: 30.56 KG/M2

## 2017-07-05 LAB
ALBUMIN SERPL BCP-MCNC: 3.7 G/DL (ref 3.5–5)
ALP SERPL-CCNC: 131 U/L (ref 46–116)
ALT SERPL W P-5'-P-CCNC: 89 U/L (ref 12–78)
ANION GAP SERPL CALCULATED.3IONS-SCNC: 5 MMOL/L (ref 4–13)
AST SERPL W P-5'-P-CCNC: 38 U/L (ref 5–45)
BASOPHILS # BLD AUTO: 0.02 THOUSANDS/ΜL (ref 0–0.1)
BASOPHILS NFR BLD AUTO: 1 % (ref 0–1)
BILIRUB SERPL-MCNC: 0.49 MG/DL (ref 0.2–1)
BUN SERPL-MCNC: 7 MG/DL (ref 5–25)
CALCIUM SERPL-MCNC: 8.8 MG/DL (ref 8.3–10.1)
CHLORIDE SERPL-SCNC: 107 MMOL/L (ref 100–108)
CO2 SERPL-SCNC: 26 MMOL/L (ref 21–32)
CREAT SERPL-MCNC: 0.83 MG/DL (ref 0.6–1.3)
EOSINOPHIL # BLD AUTO: 0.28 THOUSAND/ΜL (ref 0–0.61)
EOSINOPHIL NFR BLD AUTO: 7 % (ref 0–6)
ERYTHROCYTE [DISTWIDTH] IN BLOOD BY AUTOMATED COUNT: 12.8 % (ref 11.6–15.1)
GFR SERPL CREATININE-BSD FRML MDRD: >60 ML/MIN/1.73SQ M
GLUCOSE SERPL-MCNC: 104 MG/DL (ref 65–140)
HCT VFR BLD AUTO: 41.3 % (ref 36.5–49.3)
HGB BLD-MCNC: 14.1 G/DL (ref 12–17)
LIPASE SERPL-CCNC: 375 U/L (ref 73–393)
LYMPHOCYTES # BLD AUTO: 1.11 THOUSANDS/ΜL (ref 0.6–4.47)
LYMPHOCYTES NFR BLD AUTO: 26 % (ref 14–44)
MCH RBC QN AUTO: 27 PG (ref 26.8–34.3)
MCHC RBC AUTO-ENTMCNC: 34.1 G/DL (ref 31.4–37.4)
MCV RBC AUTO: 79 FL (ref 82–98)
MONOCYTES # BLD AUTO: 0.35 THOUSAND/ΜL (ref 0.17–1.22)
MONOCYTES NFR BLD AUTO: 8 % (ref 4–12)
NEUTROPHILS # BLD AUTO: 2.55 THOUSANDS/ΜL (ref 1.85–7.62)
NEUTS SEG NFR BLD AUTO: 58 % (ref 43–75)
NRBC BLD AUTO-RTO: 0 /100 WBCS
PLATELET # BLD AUTO: 163 THOUSANDS/UL (ref 149–390)
PMV BLD AUTO: 10.4 FL (ref 8.9–12.7)
POTASSIUM SERPL-SCNC: 3.5 MMOL/L (ref 3.5–5.3)
PROT SERPL-MCNC: 7.2 G/DL (ref 6.4–8.2)
RBC # BLD AUTO: 5.22 MILLION/UL (ref 3.88–5.62)
SODIUM SERPL-SCNC: 138 MMOL/L (ref 136–145)
WBC # BLD AUTO: 4.33 THOUSAND/UL (ref 4.31–10.16)

## 2017-07-05 PROCEDURE — 83690 ASSAY OF LIPASE: CPT | Performed by: INTERNAL MEDICINE

## 2017-07-05 PROCEDURE — 85025 COMPLETE CBC W/AUTO DIFF WBC: CPT | Performed by: INTERNAL MEDICINE

## 2017-07-05 PROCEDURE — 80053 COMPREHEN METABOLIC PANEL: CPT | Performed by: INTERNAL MEDICINE

## 2017-07-05 PROCEDURE — C9113 INJ PANTOPRAZOLE SODIUM, VIA: HCPCS | Performed by: HOSPITALIST

## 2017-07-05 RX ORDER — DOCUSATE SODIUM 100 MG/1
100 CAPSULE, LIQUID FILLED ORAL 2 TIMES DAILY
Qty: 10 CAPSULE | Refills: 0 | Status: SHIPPED | OUTPATIENT
Start: 2017-07-05 | End: 2017-07-22

## 2017-07-05 RX ORDER — OXYCODONE HYDROCHLORIDE 10 MG/1
10 TABLET ORAL EVERY 4 HOURS PRN
Qty: 15 TABLET | Refills: 0 | Status: SHIPPED | OUTPATIENT
Start: 2017-07-05 | End: 2017-07-15

## 2017-07-05 RX ORDER — SENNOSIDES 8.6 MG
1 TABLET ORAL DAILY
Qty: 120 EACH | Refills: 0 | Status: SHIPPED | OUTPATIENT
Start: 2017-07-05 | End: 2017-07-22

## 2017-07-05 RX ORDER — KETOROLAC TROMETHAMINE 10 MG/1
10 TABLET, FILM COATED ORAL EVERY 6 HOURS PRN
Qty: 20 TABLET | Refills: 0 | Status: SHIPPED | OUTPATIENT
Start: 2017-07-05 | End: 2017-07-22

## 2017-07-05 RX ADMIN — ENOXAPARIN SODIUM 40 MG: 40 INJECTION SUBCUTANEOUS at 08:07

## 2017-07-05 RX ADMIN — SODIUM CHLORIDE 125 ML/HR: 0.9 INJECTION, SOLUTION INTRAVENOUS at 08:04

## 2017-07-05 RX ADMIN — PANTOPRAZOLE SODIUM 40 MG: 40 INJECTION, POWDER, FOR SOLUTION INTRAVENOUS at 08:07

## 2017-07-05 RX ADMIN — SODIUM CHLORIDE 125 ML/HR: 0.9 INJECTION, SOLUTION INTRAVENOUS at 02:30

## 2017-07-05 RX ADMIN — OXYCODONE HYDROCHLORIDE 10 MG: 10 TABLET ORAL at 07:33

## 2017-07-07 ENCOUNTER — GENERIC CONVERSION - ENCOUNTER (OUTPATIENT)
Dept: OTHER | Facility: OTHER | Age: 42
End: 2017-07-07

## 2017-07-22 ENCOUNTER — HOSPITAL ENCOUNTER (EMERGENCY)
Facility: HOSPITAL | Age: 42
Discharge: HOME | End: 2017-07-22
Attending: EMERGENCY MEDICINE | Admitting: EMERGENCY MEDICINE
Payer: COMMERCIAL

## 2017-07-22 ENCOUNTER — APPOINTMENT (EMERGENCY)
Dept: RADIOLOGY | Facility: HOSPITAL | Age: 42
End: 2017-07-22
Payer: COMMERCIAL

## 2017-07-22 VITALS
TEMPERATURE: 97.1 F | HEART RATE: 86 BPM | OXYGEN SATURATION: 99 % | RESPIRATION RATE: 18 BRPM | BODY MASS INDEX: 29.99 KG/M2 | SYSTOLIC BLOOD PRESSURE: 127 MMHG | WEIGHT: 215 LBS | DIASTOLIC BLOOD PRESSURE: 91 MMHG

## 2017-07-22 DIAGNOSIS — R10.9 ABDOMINAL PAIN: Primary | ICD-10-CM

## 2017-07-22 LAB
ALBUMIN SERPL BCP-MCNC: 3.9 G/DL (ref 3.5–5)
ALP SERPL-CCNC: 151 U/L (ref 46–116)
ALT SERPL W P-5'-P-CCNC: 83 U/L (ref 12–78)
ANION GAP SERPL CALCULATED.3IONS-SCNC: 10 MMOL/L (ref 4–13)
AST SERPL W P-5'-P-CCNC: 27 U/L (ref 5–45)
BASOPHILS # BLD AUTO: 0.02 THOUSANDS/ΜL (ref 0–0.1)
BASOPHILS NFR BLD AUTO: 0 % (ref 0–1)
BILIRUB SERPL-MCNC: 0.34 MG/DL (ref 0.2–1)
BUN SERPL-MCNC: 14 MG/DL (ref 5–25)
CALCIUM SERPL-MCNC: 9.4 MG/DL (ref 8.3–10.1)
CHLORIDE SERPL-SCNC: 103 MMOL/L (ref 100–108)
CO2 SERPL-SCNC: 25 MMOL/L (ref 21–32)
CREAT SERPL-MCNC: 1.04 MG/DL (ref 0.6–1.3)
EOSINOPHIL # BLD AUTO: 0.46 THOUSAND/ΜL (ref 0–0.61)
EOSINOPHIL NFR BLD AUTO: 8 % (ref 0–6)
ERYTHROCYTE [DISTWIDTH] IN BLOOD BY AUTOMATED COUNT: 12.6 % (ref 11.6–15.1)
GFR SERPL CREATININE-BSD FRML MDRD: >60 ML/MIN/1.73SQ M
GLUCOSE SERPL-MCNC: 221 MG/DL (ref 65–140)
HCT VFR BLD AUTO: 43.2 % (ref 36.5–49.3)
HGB BLD-MCNC: 14.8 G/DL (ref 12–17)
LIPASE SERPL-CCNC: 178 U/L (ref 73–393)
LYMPHOCYTES # BLD AUTO: 1.07 THOUSANDS/ΜL (ref 0.6–4.47)
LYMPHOCYTES NFR BLD AUTO: 19 % (ref 14–44)
MCH RBC QN AUTO: 26.9 PG (ref 26.8–34.3)
MCHC RBC AUTO-ENTMCNC: 34.3 G/DL (ref 31.4–37.4)
MCV RBC AUTO: 79 FL (ref 82–98)
MONOCYTES # BLD AUTO: 0.48 THOUSAND/ΜL (ref 0.17–1.22)
MONOCYTES NFR BLD AUTO: 9 % (ref 4–12)
NEUTROPHILS # BLD AUTO: 3.58 THOUSANDS/ΜL (ref 1.85–7.62)
NEUTS SEG NFR BLD AUTO: 64 % (ref 43–75)
NRBC BLD AUTO-RTO: 0 /100 WBCS
PLATELET # BLD AUTO: 188 THOUSANDS/UL (ref 149–390)
PMV BLD AUTO: 11.1 FL (ref 8.9–12.7)
POTASSIUM SERPL-SCNC: 3.5 MMOL/L (ref 3.5–5.3)
PROT SERPL-MCNC: 7.8 G/DL (ref 6.4–8.2)
RBC # BLD AUTO: 5.5 MILLION/UL (ref 3.88–5.62)
SODIUM SERPL-SCNC: 138 MMOL/L (ref 136–145)
WBC # BLD AUTO: 5.65 THOUSAND/UL (ref 4.31–10.16)

## 2017-07-22 PROCEDURE — 74177 CT ABD & PELVIS W/CONTRAST: CPT

## 2017-07-22 PROCEDURE — 96361 HYDRATE IV INFUSION ADD-ON: CPT

## 2017-07-22 PROCEDURE — 96375 TX/PRO/DX INJ NEW DRUG ADDON: CPT

## 2017-07-22 PROCEDURE — 99284 EMERGENCY DEPT VISIT MOD MDM: CPT

## 2017-07-22 PROCEDURE — 36415 COLL VENOUS BLD VENIPUNCTURE: CPT | Performed by: EMERGENCY MEDICINE

## 2017-07-22 PROCEDURE — 85025 COMPLETE CBC W/AUTO DIFF WBC: CPT | Performed by: EMERGENCY MEDICINE

## 2017-07-22 PROCEDURE — 96374 THER/PROPH/DIAG INJ IV PUSH: CPT

## 2017-07-22 PROCEDURE — 83690 ASSAY OF LIPASE: CPT | Performed by: EMERGENCY MEDICINE

## 2017-07-22 PROCEDURE — 80053 COMPREHEN METABOLIC PANEL: CPT | Performed by: EMERGENCY MEDICINE

## 2017-07-22 RX ORDER — AMLODIPINE BESYLATE 10 MG/1
10 TABLET ORAL DAILY
COMMUNITY
End: 2018-05-13 | Stop reason: ALTCHOICE

## 2017-07-22 RX ORDER — ONDANSETRON 2 MG/ML
4 INJECTION INTRAMUSCULAR; INTRAVENOUS ONCE
Status: COMPLETED | OUTPATIENT
Start: 2017-07-22 | End: 2017-07-22

## 2017-07-22 RX ORDER — ONDANSETRON 4 MG/1
4 TABLET, ORALLY DISINTEGRATING ORAL EVERY 4 HOURS PRN
Qty: 15 TABLET | Refills: 0 | Status: ON HOLD | OUTPATIENT
Start: 2017-07-22 | End: 2017-07-24 | Stop reason: ALTCHOICE

## 2017-07-22 RX ADMIN — ONDANSETRON 4 MG: 2 INJECTION INTRAMUSCULAR; INTRAVENOUS at 20:30

## 2017-07-22 RX ADMIN — SODIUM CHLORIDE 1000 ML: 0.9 INJECTION, SOLUTION INTRAVENOUS at 20:25

## 2017-07-22 RX ADMIN — IOHEXOL 100 ML: 350 INJECTION, SOLUTION INTRAVENOUS at 22:04

## 2017-07-22 RX ADMIN — HYDROMORPHONE HYDROCHLORIDE 1 MG: 1 INJECTION, SOLUTION INTRAMUSCULAR; INTRAVENOUS; SUBCUTANEOUS at 20:34

## 2017-07-24 ENCOUNTER — HOSPITAL ENCOUNTER (OUTPATIENT)
Facility: HOSPITAL | Age: 42
Setting detail: OUTPATIENT SURGERY
Discharge: HOME | End: 2017-07-24
Attending: INTERNAL MEDICINE | Admitting: INTERNAL MEDICINE
Payer: COMMERCIAL

## 2017-07-24 ENCOUNTER — ANESTHESIA EVENT (OUTPATIENT)
Dept: GASTROENTEROLOGY | Facility: HOSPITAL | Age: 42
End: 2017-07-24
Payer: COMMERCIAL

## 2017-07-24 ENCOUNTER — ANESTHESIA (OUTPATIENT)
Dept: GASTROENTEROLOGY | Facility: HOSPITAL | Age: 42
End: 2017-07-24
Payer: COMMERCIAL

## 2017-07-24 VITALS
DIASTOLIC BLOOD PRESSURE: 73 MMHG | HEIGHT: 70 IN | SYSTOLIC BLOOD PRESSURE: 111 MMHG | RESPIRATION RATE: 16 BRPM | WEIGHT: 215 LBS | BODY MASS INDEX: 30.78 KG/M2 | HEART RATE: 79 BPM | TEMPERATURE: 96.8 F | OXYGEN SATURATION: 94 %

## 2017-07-24 DIAGNOSIS — R10.13 EPIGASTRIC PAIN: ICD-10-CM

## 2017-07-24 PROCEDURE — 88342 IMHCHEM/IMCYTCHM 1ST ANTB: CPT | Performed by: INTERNAL MEDICINE

## 2017-07-24 PROCEDURE — C1726 CATH, BAL DIL, NON-VASCULAR: HCPCS | Performed by: INTERNAL MEDICINE

## 2017-07-24 PROCEDURE — 88305 TISSUE EXAM BY PATHOLOGIST: CPT | Performed by: INTERNAL MEDICINE

## 2017-07-24 RX ORDER — SUCCINYLCHOLINE CHLORIDE 20 MG/ML
INJECTION INTRAMUSCULAR; INTRAVENOUS AS NEEDED
Status: DISCONTINUED | OUTPATIENT
Start: 2017-07-24 | End: 2017-07-24 | Stop reason: SURG

## 2017-07-24 RX ORDER — CIPROFLOXACIN 2 MG/ML
400 INJECTION, SOLUTION INTRAVENOUS ONCE
Status: COMPLETED | OUTPATIENT
Start: 2017-07-24 | End: 2017-07-24

## 2017-07-24 RX ORDER — PROPOFOL 10 MG/ML
INJECTION, EMULSION INTRAVENOUS AS NEEDED
Status: DISCONTINUED | OUTPATIENT
Start: 2017-07-24 | End: 2017-07-24 | Stop reason: SURG

## 2017-07-24 RX ORDER — DEXAMETHASONE SODIUM PHOSPHATE 4 MG/ML
INJECTION, SOLUTION INTRA-ARTICULAR; INTRALESIONAL; INTRAMUSCULAR; INTRAVENOUS; SOFT TISSUE AS NEEDED
Status: DISCONTINUED | OUTPATIENT
Start: 2017-07-24 | End: 2017-07-24 | Stop reason: SURG

## 2017-07-24 RX ORDER — ONDANSETRON 2 MG/ML
INJECTION INTRAMUSCULAR; INTRAVENOUS AS NEEDED
Status: DISCONTINUED | OUTPATIENT
Start: 2017-07-24 | End: 2017-07-24 | Stop reason: SURG

## 2017-07-24 RX ORDER — SODIUM CHLORIDE 9 MG/ML
50 INJECTION, SOLUTION INTRAVENOUS CONTINUOUS
Status: DISCONTINUED | OUTPATIENT
Start: 2017-07-24 | End: 2017-07-24 | Stop reason: HOSPADM

## 2017-07-24 RX ADMIN — SODIUM CHLORIDE: 0.9 INJECTION, SOLUTION INTRAVENOUS at 11:34

## 2017-07-24 RX ADMIN — PROPOFOL 50 MG: 10 INJECTION, EMULSION INTRAVENOUS at 11:55

## 2017-07-24 RX ADMIN — DEXAMETHASONE SODIUM PHOSPHATE 4 MG: 4 INJECTION, SOLUTION INTRAMUSCULAR; INTRAVENOUS at 12:21

## 2017-07-24 RX ADMIN — PROPOFOL 50 MG: 10 INJECTION, EMULSION INTRAVENOUS at 12:03

## 2017-07-24 RX ADMIN — SODIUM CHLORIDE 50 ML/HR: 0.9 INJECTION, SOLUTION INTRAVENOUS at 11:33

## 2017-07-24 RX ADMIN — ONDANSETRON 4 MG: 2 INJECTION INTRAMUSCULAR; INTRAVENOUS at 12:21

## 2017-07-24 RX ADMIN — SUCCINYLCHOLINE CHLORIDE 100 MG: 20 INJECTION, SOLUTION INTRAMUSCULAR; INTRAVENOUS at 11:50

## 2017-07-24 RX ADMIN — CIPROFLOXACIN 400 MG: 2 INJECTION, SOLUTION INTRAVENOUS at 11:43

## 2017-07-24 RX ADMIN — PROPOFOL 200 MG: 10 INJECTION, EMULSION INTRAVENOUS at 11:50

## 2017-07-25 ENCOUNTER — APPOINTMENT (EMERGENCY)
Dept: RADIOLOGY | Facility: HOSPITAL | Age: 42
DRG: 439 | End: 2017-07-25
Payer: COMMERCIAL

## 2017-07-25 ENCOUNTER — HOSPITAL ENCOUNTER (INPATIENT)
Facility: HOSPITAL | Age: 42
LOS: 2 days | Discharge: HOME | DRG: 439 | End: 2017-07-30
Attending: EMERGENCY MEDICINE | Admitting: HOSPITALIST
Payer: COMMERCIAL

## 2017-07-25 DIAGNOSIS — K85.90 PANCREATITIS: Primary | ICD-10-CM

## 2017-07-25 DIAGNOSIS — R10.13 EPIGASTRIC ABDOMINAL PAIN: ICD-10-CM

## 2017-07-25 PROBLEM — R10.9 ABDOMINAL PAIN: Status: ACTIVE | Noted: 2017-07-25

## 2017-07-25 LAB
ALBUMIN SERPL BCP-MCNC: 4.3 G/DL (ref 3.5–5)
ALP SERPL-CCNC: 157 U/L (ref 46–116)
ALT SERPL W P-5'-P-CCNC: 86 U/L (ref 12–78)
ANION GAP SERPL CALCULATED.3IONS-SCNC: 7 MMOL/L (ref 4–13)
AST SERPL W P-5'-P-CCNC: 31 U/L (ref 5–45)
BACTERIA UR QL AUTO: ABNORMAL /HPF
BASOPHILS # BLD AUTO: 0.03 THOUSANDS/ΜL (ref 0–0.1)
BASOPHILS NFR BLD AUTO: 1 % (ref 0–1)
BILIRUB SERPL-MCNC: 0.47 MG/DL (ref 0.2–1)
BILIRUB UR QL STRIP: ABNORMAL
BUN SERPL-MCNC: 12 MG/DL (ref 5–25)
CALCIUM SERPL-MCNC: 9.4 MG/DL (ref 8.3–10.1)
CHLORIDE SERPL-SCNC: 105 MMOL/L (ref 100–108)
CLARITY UR: CLEAR
CO2 SERPL-SCNC: 27 MMOL/L (ref 21–32)
COLOR UR: ABNORMAL
COLOR, POC: NORMAL
CREAT SERPL-MCNC: 1.13 MG/DL (ref 0.6–1.3)
EOSINOPHIL # BLD AUTO: 0.12 THOUSAND/ΜL (ref 0–0.61)
EOSINOPHIL NFR BLD AUTO: 2 % (ref 0–6)
ERYTHROCYTE [DISTWIDTH] IN BLOOD BY AUTOMATED COUNT: 12.7 % (ref 11.6–15.1)
GFR SERPL CREATININE-BSD FRML MDRD: 92 ML/MIN/1.73SQ M
GLUCOSE SERPL-MCNC: 218 MG/DL (ref 65–140)
GLUCOSE UR STRIP-MCNC: ABNORMAL MG/DL
HCT VFR BLD AUTO: 43 % (ref 36.5–49.3)
HGB BLD-MCNC: 15.1 G/DL (ref 12–17)
HGB UR QL STRIP.AUTO: NEGATIVE
HOLD SPECIMEN: NORMAL
HYALINE CASTS #/AREA URNS LPF: ABNORMAL /LPF
KETONES UR STRIP-MCNC: ABNORMAL MG/DL
LEUKOCYTE ESTERASE UR QL STRIP: NEGATIVE
LIPASE SERPL-CCNC: 364 U/L (ref 73–393)
LYMPHOCYTES # BLD AUTO: 1.41 THOUSANDS/ΜL (ref 0.6–4.47)
LYMPHOCYTES NFR BLD AUTO: 21 % (ref 14–44)
MCH RBC QN AUTO: 27.1 PG (ref 26.8–34.3)
MCHC RBC AUTO-ENTMCNC: 35.1 G/DL (ref 31.4–37.4)
MCV RBC AUTO: 77 FL (ref 82–98)
MONOCYTES # BLD AUTO: 0.59 THOUSAND/ΜL (ref 0.17–1.22)
MONOCYTES NFR BLD AUTO: 9 % (ref 4–12)
NEUTROPHILS # BLD AUTO: 4.38 THOUSANDS/ΜL (ref 1.85–7.62)
NEUTS SEG NFR BLD AUTO: 67 % (ref 43–75)
NITRITE UR QL STRIP: NEGATIVE
NON-SQ EPI CELLS URNS QL MICRO: ABNORMAL /HPF
NRBC BLD AUTO-RTO: 0 /100 WBCS
PH UR STRIP.AUTO: 5.5 [PH] (ref 4.5–8)
PLATELET # BLD AUTO: 202 THOUSANDS/UL (ref 149–390)
PMV BLD AUTO: 11 FL (ref 8.9–12.7)
POTASSIUM SERPL-SCNC: 3.4 MMOL/L (ref 3.5–5.3)
PROT SERPL-MCNC: 8 G/DL (ref 6.4–8.2)
PROT UR STRIP-MCNC: ABNORMAL MG/DL
RBC # BLD AUTO: 5.57 MILLION/UL (ref 3.88–5.62)
RBC #/AREA URNS AUTO: ABNORMAL /HPF
SODIUM SERPL-SCNC: 139 MMOL/L (ref 136–145)
SP GR UR STRIP.AUTO: >=1.03 (ref 1–1.03)
SPECIMEN SOURCE: NORMAL
TROPONIN I BLD-MCNC: 0 NG/ML (ref 0–0.08)
UROBILINOGEN UR QL STRIP.AUTO: 0.2 E.U./DL
WBC # BLD AUTO: 6.58 THOUSAND/UL (ref 4.31–10.16)
WBC #/AREA URNS AUTO: ABNORMAL /HPF

## 2017-07-25 PROCEDURE — 96361 HYDRATE IV INFUSION ADD-ON: CPT

## 2017-07-25 PROCEDURE — 85025 COMPLETE CBC W/AUTO DIFF WBC: CPT | Performed by: EMERGENCY MEDICINE

## 2017-07-25 PROCEDURE — 81002 URINALYSIS NONAUTO W/O SCOPE: CPT | Performed by: EMERGENCY MEDICINE

## 2017-07-25 PROCEDURE — C9113 INJ PANTOPRAZOLE SODIUM, VIA: HCPCS | Performed by: HOSPITALIST

## 2017-07-25 PROCEDURE — 96374 THER/PROPH/DIAG INJ IV PUSH: CPT

## 2017-07-25 PROCEDURE — 83690 ASSAY OF LIPASE: CPT | Performed by: EMERGENCY MEDICINE

## 2017-07-25 PROCEDURE — 93005 ELECTROCARDIOGRAM TRACING: CPT

## 2017-07-25 PROCEDURE — 74177 CT ABD & PELVIS W/CONTRAST: CPT

## 2017-07-25 PROCEDURE — 99285 EMERGENCY DEPT VISIT HI MDM: CPT

## 2017-07-25 PROCEDURE — 84484 ASSAY OF TROPONIN QUANT: CPT

## 2017-07-25 PROCEDURE — 36415 COLL VENOUS BLD VENIPUNCTURE: CPT

## 2017-07-25 PROCEDURE — 81001 URINALYSIS AUTO W/SCOPE: CPT

## 2017-07-25 PROCEDURE — 80053 COMPREHEN METABOLIC PANEL: CPT | Performed by: EMERGENCY MEDICINE

## 2017-07-25 PROCEDURE — 71260 CT THORAX DX C+: CPT

## 2017-07-25 PROCEDURE — 96375 TX/PRO/DX INJ NEW DRUG ADDON: CPT

## 2017-07-25 RX ORDER — AMLODIPINE BESYLATE 10 MG/1
10 TABLET ORAL DAILY
Status: DISCONTINUED | OUTPATIENT
Start: 2017-07-26 | End: 2017-07-30 | Stop reason: HOSPADM

## 2017-07-25 RX ORDER — DOCUSATE SODIUM 100 MG/1
100 CAPSULE, LIQUID FILLED ORAL 2 TIMES DAILY
Status: DISCONTINUED | OUTPATIENT
Start: 2017-07-25 | End: 2017-07-30 | Stop reason: HOSPADM

## 2017-07-25 RX ORDER — SODIUM CHLORIDE 9 MG/ML
100 INJECTION, SOLUTION INTRAVENOUS CONTINUOUS
Status: DISPENSED | OUTPATIENT
Start: 2017-07-25 | End: 2017-07-26

## 2017-07-25 RX ORDER — PANTOPRAZOLE SODIUM 40 MG/1
40 INJECTION, POWDER, FOR SOLUTION INTRAVENOUS EVERY 12 HOURS SCHEDULED
Status: DISCONTINUED | OUTPATIENT
Start: 2017-07-25 | End: 2017-07-28

## 2017-07-25 RX ORDER — ACETAMINOPHEN 325 MG/1
650 TABLET ORAL EVERY 6 HOURS PRN
Status: DISCONTINUED | OUTPATIENT
Start: 2017-07-25 | End: 2017-07-30 | Stop reason: HOSPADM

## 2017-07-25 RX ORDER — ONDANSETRON 2 MG/ML
4 INJECTION INTRAMUSCULAR; INTRAVENOUS ONCE
Status: COMPLETED | OUTPATIENT
Start: 2017-07-25 | End: 2017-07-25

## 2017-07-25 RX ORDER — SENNOSIDES 8.6 MG
1 TABLET ORAL DAILY
Status: DISCONTINUED | OUTPATIENT
Start: 2017-07-26 | End: 2017-07-27

## 2017-07-25 RX ORDER — ONDANSETRON 2 MG/ML
4 INJECTION INTRAMUSCULAR; INTRAVENOUS EVERY 6 HOURS PRN
Status: DISCONTINUED | OUTPATIENT
Start: 2017-07-25 | End: 2017-07-30 | Stop reason: HOSPADM

## 2017-07-25 RX ADMIN — HYDROMORPHONE HYDROCHLORIDE 0.5 MG: 1 INJECTION, SOLUTION INTRAMUSCULAR; INTRAVENOUS; SUBCUTANEOUS at 20:42

## 2017-07-25 RX ADMIN — IOHEXOL 100 ML: 350 INJECTION, SOLUTION INTRAVENOUS at 17:28

## 2017-07-25 RX ADMIN — ONDANSETRON 4 MG: 2 INJECTION INTRAMUSCULAR; INTRAVENOUS at 16:51

## 2017-07-25 RX ADMIN — SODIUM CHLORIDE 1000 ML: 0.9 INJECTION, SOLUTION INTRAVENOUS at 16:51

## 2017-07-25 RX ADMIN — HYDROMORPHONE HYDROCHLORIDE 0.5 MG: 1 INJECTION, SOLUTION INTRAMUSCULAR; INTRAVENOUS; SUBCUTANEOUS at 16:51

## 2017-07-25 RX ADMIN — SODIUM CHLORIDE 100 ML/HR: 0.9 INJECTION, SOLUTION INTRAVENOUS at 20:41

## 2017-07-25 RX ADMIN — PANTOPRAZOLE SODIUM 40 MG: 40 INJECTION, POWDER, FOR SOLUTION INTRAVENOUS at 20:41

## 2017-07-26 PROBLEM — K20.0 ESOPHAGITIS, EOSINOPHILIC: Status: ACTIVE | Noted: 2017-07-26

## 2017-07-26 PROBLEM — E87.6 HYPOKALEMIA: Status: ACTIVE | Noted: 2017-07-26

## 2017-07-26 LAB
ANION GAP SERPL CALCULATED.3IONS-SCNC: 6 MMOL/L (ref 4–13)
ATRIAL RATE: 75 BPM
BUN SERPL-MCNC: 10 MG/DL (ref 5–25)
CALCIUM SERPL-MCNC: 8.6 MG/DL (ref 8.3–10.1)
CHLORIDE SERPL-SCNC: 107 MMOL/L (ref 100–108)
CO2 SERPL-SCNC: 27 MMOL/L (ref 21–32)
CREAT SERPL-MCNC: 0.88 MG/DL (ref 0.6–1.3)
ERYTHROCYTE [DISTWIDTH] IN BLOOD BY AUTOMATED COUNT: 12.9 % (ref 11.6–15.1)
GFR SERPL CREATININE-BSD FRML MDRD: 122 ML/MIN/1.73SQ M
GLUCOSE SERPL-MCNC: 149 MG/DL (ref 65–140)
GLUCOSE SERPL-MCNC: 156 MG/DL (ref 65–140)
GLUCOSE SERPL-MCNC: 176 MG/DL (ref 65–140)
HCT VFR BLD AUTO: 40 % (ref 36.5–49.3)
HGB BLD-MCNC: 13.5 G/DL (ref 12–17)
MCH RBC QN AUTO: 26.7 PG (ref 26.8–34.3)
MCHC RBC AUTO-ENTMCNC: 33.8 G/DL (ref 31.4–37.4)
MCV RBC AUTO: 79 FL (ref 82–98)
P AXIS: 40 DEGREES
PLATELET # BLD AUTO: 163 THOUSANDS/UL (ref 149–390)
PMV BLD AUTO: 10.5 FL (ref 8.9–12.7)
POTASSIUM SERPL-SCNC: 3.3 MMOL/L (ref 3.5–5.3)
PR INTERVAL: 170 MS
QRS AXIS: 37 DEGREES
QRSD INTERVAL: 98 MS
QT INTERVAL: 374 MS
QTC INTERVAL: 417 MS
RBC # BLD AUTO: 5.06 MILLION/UL (ref 3.88–5.62)
SODIUM SERPL-SCNC: 140 MMOL/L (ref 136–145)
T WAVE AXIS: -4 DEGREES
VENTRICULAR RATE: 75 BPM
WBC # BLD AUTO: 4.99 THOUSAND/UL (ref 4.31–10.16)

## 2017-07-26 PROCEDURE — 85027 COMPLETE CBC AUTOMATED: CPT | Performed by: HOSPITALIST

## 2017-07-26 PROCEDURE — C9113 INJ PANTOPRAZOLE SODIUM, VIA: HCPCS | Performed by: HOSPITALIST

## 2017-07-26 PROCEDURE — 82948 REAGENT STRIP/BLOOD GLUCOSE: CPT

## 2017-07-26 PROCEDURE — 80048 BASIC METABOLIC PNL TOTAL CA: CPT | Performed by: HOSPITALIST

## 2017-07-26 RX ORDER — OXYCODONE HYDROCHLORIDE 10 MG/1
10 TABLET ORAL EVERY 4 HOURS PRN
Status: DISCONTINUED | OUTPATIENT
Start: 2017-07-26 | End: 2017-07-27

## 2017-07-26 RX ORDER — SODIUM CHLORIDE 9 MG/ML
75 INJECTION, SOLUTION INTRAVENOUS CONTINUOUS
Status: DISCONTINUED | OUTPATIENT
Start: 2017-07-26 | End: 2017-07-26

## 2017-07-26 RX ORDER — OXYCODONE HYDROCHLORIDE 5 MG/1
5 TABLET ORAL EVERY 4 HOURS PRN
Status: DISCONTINUED | OUTPATIENT
Start: 2017-07-26 | End: 2017-07-27

## 2017-07-26 RX ORDER — FLUTICASONE PROPIONATE 220 UG/1
2 AEROSOL, METERED RESPIRATORY (INHALATION) EVERY 12 HOURS SCHEDULED
Status: DISCONTINUED | OUTPATIENT
Start: 2017-07-26 | End: 2017-07-30 | Stop reason: HOSPADM

## 2017-07-26 RX ORDER — POTASSIUM CHLORIDE 750 MG/1
20 TABLET, EXTENDED RELEASE ORAL 2 TIMES DAILY
Status: COMPLETED | OUTPATIENT
Start: 2017-07-26 | End: 2017-07-26

## 2017-07-26 RX ORDER — SODIUM CHLORIDE 9 MG/ML
75 INJECTION, SOLUTION INTRAVENOUS CONTINUOUS
Status: DISCONTINUED | OUTPATIENT
Start: 2017-07-26 | End: 2017-07-27

## 2017-07-26 RX ORDER — FLUTICASONE PROPIONATE 220 UG/1
1 AEROSOL, METERED RESPIRATORY (INHALATION) EVERY 12 HOURS SCHEDULED
Status: DISCONTINUED | OUTPATIENT
Start: 2017-07-26 | End: 2017-07-26

## 2017-07-26 RX ADMIN — POTASSIUM CHLORIDE 20 MEQ: 750 TABLET, EXTENDED RELEASE ORAL at 08:36

## 2017-07-26 RX ADMIN — SODIUM CHLORIDE 75 ML/HR: 0.9 INJECTION, SOLUTION INTRAVENOUS at 14:32

## 2017-07-26 RX ADMIN — HYDROMORPHONE HYDROCHLORIDE 0.2 MG: 1 INJECTION, SOLUTION INTRAMUSCULAR; INTRAVENOUS; SUBCUTANEOUS at 12:37

## 2017-07-26 RX ADMIN — HYDROMORPHONE HYDROCHLORIDE 0.5 MG: 1 INJECTION, SOLUTION INTRAMUSCULAR; INTRAVENOUS; SUBCUTANEOUS at 04:15

## 2017-07-26 RX ADMIN — HYDROMORPHONE HYDROCHLORIDE 0.2 MG: 1 INJECTION, SOLUTION INTRAMUSCULAR; INTRAVENOUS; SUBCUTANEOUS at 17:26

## 2017-07-26 RX ADMIN — OXYCODONE HYDROCHLORIDE 10 MG: 10 TABLET ORAL at 21:06

## 2017-07-26 RX ADMIN — FLUTICASONE PROPIONATE 1 PUFF: 220 AEROSOL, METERED RESPIRATORY (INHALATION) at 10:11

## 2017-07-26 RX ADMIN — HYDROMORPHONE HYDROCHLORIDE 0.5 MG: 1 INJECTION, SOLUTION INTRAMUSCULAR; INTRAVENOUS; SUBCUTANEOUS at 00:00

## 2017-07-26 RX ADMIN — PANTOPRAZOLE SODIUM 40 MG: 40 INJECTION, POWDER, FOR SOLUTION INTRAVENOUS at 21:06

## 2017-07-26 RX ADMIN — OXYCODONE HYDROCHLORIDE 10 MG: 10 TABLET ORAL at 16:33

## 2017-07-26 RX ADMIN — HYDROMORPHONE HYDROCHLORIDE 0.5 MG: 1 INJECTION, SOLUTION INTRAMUSCULAR; INTRAVENOUS; SUBCUTANEOUS at 08:36

## 2017-07-26 RX ADMIN — DOCUSATE SODIUM 100 MG: 100 CAPSULE, LIQUID FILLED ORAL at 17:26

## 2017-07-26 RX ADMIN — FLUTICASONE PROPIONATE 2 PUFF: 220 AEROSOL, METERED RESPIRATORY (INHALATION) at 21:06

## 2017-07-26 RX ADMIN — INSULIN LISPRO 1 UNITS: 100 INJECTION, SOLUTION INTRAVENOUS; SUBCUTANEOUS at 21:07

## 2017-07-26 RX ADMIN — SENNOSIDES 8.6 MG: 8.6 TABLET, FILM COATED ORAL at 08:36

## 2017-07-26 RX ADMIN — AMLODIPINE BESYLATE 10 MG: 10 TABLET ORAL at 08:36

## 2017-07-26 RX ADMIN — ENOXAPARIN SODIUM 40 MG: 40 INJECTION SUBCUTANEOUS at 08:36

## 2017-07-26 RX ADMIN — PANTOPRAZOLE SODIUM 40 MG: 40 INJECTION, POWDER, FOR SOLUTION INTRAVENOUS at 08:36

## 2017-07-26 RX ADMIN — INSULIN LISPRO 1 UNITS: 100 INJECTION, SOLUTION INTRAVENOUS; SUBCUTANEOUS at 16:34

## 2017-07-26 RX ADMIN — DOCUSATE SODIUM 100 MG: 100 CAPSULE, LIQUID FILLED ORAL at 08:36

## 2017-07-26 RX ADMIN — SODIUM CHLORIDE 100 ML/HR: 0.9 INJECTION, SOLUTION INTRAVENOUS at 06:02

## 2017-07-26 RX ADMIN — POTASSIUM CHLORIDE 20 MEQ: 750 TABLET, EXTENDED RELEASE ORAL at 17:26

## 2017-07-27 ENCOUNTER — GENERIC CONVERSION - ENCOUNTER (OUTPATIENT)
Dept: OTHER | Facility: OTHER | Age: 42
End: 2017-07-27

## 2017-07-27 PROBLEM — R73.09 ELEVATED HEMOGLOBIN A1C: Status: ACTIVE | Noted: 2017-07-27

## 2017-07-27 LAB
ANION GAP SERPL CALCULATED.3IONS-SCNC: 7 MMOL/L (ref 4–13)
BUN SERPL-MCNC: 8 MG/DL (ref 5–25)
CALCIUM SERPL-MCNC: 8.9 MG/DL (ref 8.3–10.1)
CHLORIDE SERPL-SCNC: 105 MMOL/L (ref 100–108)
CO2 SERPL-SCNC: 28 MMOL/L (ref 21–32)
CREAT SERPL-MCNC: 0.85 MG/DL (ref 0.6–1.3)
GFR SERPL CREATININE-BSD FRML MDRD: 123 ML/MIN/1.73SQ M
GLUCOSE SERPL-MCNC: 139 MG/DL (ref 65–140)
GLUCOSE SERPL-MCNC: 140 MG/DL (ref 65–140)
GLUCOSE SERPL-MCNC: 153 MG/DL (ref 65–140)
GLUCOSE SERPL-MCNC: 173 MG/DL (ref 65–140)
GLUCOSE SERPL-MCNC: 219 MG/DL (ref 65–140)
POTASSIUM SERPL-SCNC: 3.3 MMOL/L (ref 3.5–5.3)
SODIUM SERPL-SCNC: 140 MMOL/L (ref 136–145)

## 2017-07-27 PROCEDURE — 82948 REAGENT STRIP/BLOOD GLUCOSE: CPT

## 2017-07-27 PROCEDURE — C9113 INJ PANTOPRAZOLE SODIUM, VIA: HCPCS | Performed by: HOSPITALIST

## 2017-07-27 PROCEDURE — 80048 BASIC METABOLIC PNL TOTAL CA: CPT | Performed by: NURSE PRACTITIONER

## 2017-07-27 PROCEDURE — 82105 ALPHA-FETOPROTEIN SERUM: CPT | Performed by: INTERNAL MEDICINE

## 2017-07-27 RX ORDER — SODIUM CHLORIDE 9 MG/ML
75 INJECTION, SOLUTION INTRAVENOUS CONTINUOUS
Status: DISCONTINUED | OUTPATIENT
Start: 2017-07-27 | End: 2017-07-30 | Stop reason: HOSPADM

## 2017-07-27 RX ORDER — OXYCODONE HYDROCHLORIDE AND ACETAMINOPHEN 5; 325 MG/1; MG/1
1 TABLET ORAL EVERY 4 HOURS PRN
Status: DISCONTINUED | OUTPATIENT
Start: 2017-07-27 | End: 2017-07-29

## 2017-07-27 RX ORDER — POLYETHYLENE GLYCOL 3350 17 G/17G
17 POWDER, FOR SOLUTION ORAL DAILY
Status: DISCONTINUED | OUTPATIENT
Start: 2017-07-27 | End: 2017-07-30 | Stop reason: HOSPADM

## 2017-07-27 RX ORDER — KETOROLAC TROMETHAMINE 30 MG/ML
15 INJECTION, SOLUTION INTRAMUSCULAR; INTRAVENOUS EVERY 6 HOURS PRN
Status: DISCONTINUED | OUTPATIENT
Start: 2017-07-27 | End: 2017-07-29

## 2017-07-27 RX ORDER — SENNOSIDES 8.6 MG
2 TABLET ORAL DAILY
Status: DISCONTINUED | OUTPATIENT
Start: 2017-07-28 | End: 2017-07-30 | Stop reason: HOSPADM

## 2017-07-27 RX ORDER — POTASSIUM CHLORIDE 750 MG/1
40 TABLET, EXTENDED RELEASE ORAL ONCE
Status: COMPLETED | OUTPATIENT
Start: 2017-07-27 | End: 2017-07-27

## 2017-07-27 RX ADMIN — KETOROLAC TROMETHAMINE 15 MG: 30 INJECTION, SOLUTION INTRAMUSCULAR at 08:40

## 2017-07-27 RX ADMIN — OXYCODONE HYDROCHLORIDE AND ACETAMINOPHEN 1 TABLET: 5; 325 TABLET ORAL at 12:08

## 2017-07-27 RX ADMIN — FLUTICASONE PROPIONATE 2 PUFF: 220 AEROSOL, METERED RESPIRATORY (INHALATION) at 08:41

## 2017-07-27 RX ADMIN — SODIUM CHLORIDE 75 ML/HR: 0.9 INJECTION, SOLUTION INTRAVENOUS at 17:32

## 2017-07-27 RX ADMIN — PANTOPRAZOLE SODIUM 40 MG: 40 INJECTION, POWDER, FOR SOLUTION INTRAVENOUS at 09:21

## 2017-07-27 RX ADMIN — SENNOSIDES 8.6 MG: 8.6 TABLET, FILM COATED ORAL at 08:41

## 2017-07-27 RX ADMIN — ONDANSETRON 4 MG: 2 INJECTION INTRAMUSCULAR; INTRAVENOUS at 08:39

## 2017-07-27 RX ADMIN — POTASSIUM CHLORIDE 40 MEQ: 750 TABLET, EXTENDED RELEASE ORAL at 08:41

## 2017-07-27 RX ADMIN — ENOXAPARIN SODIUM 40 MG: 40 INJECTION SUBCUTANEOUS at 08:41

## 2017-07-27 RX ADMIN — AMLODIPINE BESYLATE 10 MG: 10 TABLET ORAL at 08:41

## 2017-07-27 RX ADMIN — SODIUM CHLORIDE 75 ML/HR: 0.9 INJECTION, SOLUTION INTRAVENOUS at 04:02

## 2017-07-27 RX ADMIN — INSULIN LISPRO 1 UNITS: 100 INJECTION, SOLUTION INTRAVENOUS; SUBCUTANEOUS at 17:31

## 2017-07-27 RX ADMIN — DOCUSATE SODIUM 100 MG: 100 CAPSULE, LIQUID FILLED ORAL at 08:41

## 2017-07-27 RX ADMIN — PANTOPRAZOLE SODIUM 40 MG: 40 INJECTION, POWDER, FOR SOLUTION INTRAVENOUS at 22:37

## 2017-07-27 RX ADMIN — FLUTICASONE PROPIONATE 2 PUFF: 220 AEROSOL, METERED RESPIRATORY (INHALATION) at 22:38

## 2017-07-27 RX ADMIN — SODIUM CHLORIDE 75 ML/HR: 0.9 INJECTION, SOLUTION INTRAVENOUS at 09:21

## 2017-07-27 RX ADMIN — HYDROMORPHONE HYDROCHLORIDE 0.2 MG: 1 INJECTION, SOLUTION INTRAMUSCULAR; INTRAVENOUS; SUBCUTANEOUS at 00:09

## 2017-07-27 RX ADMIN — INSULIN LISPRO 1 UNITS: 100 INJECTION, SOLUTION INTRAVENOUS; SUBCUTANEOUS at 22:41

## 2017-07-27 RX ADMIN — INSULIN LISPRO 1 UNITS: 100 INJECTION, SOLUTION INTRAVENOUS; SUBCUTANEOUS at 11:12

## 2017-07-27 RX ADMIN — KETOROLAC TROMETHAMINE 15 MG: 30 INJECTION, SOLUTION INTRAMUSCULAR at 22:44

## 2017-07-27 RX ADMIN — POLYETHYLENE GLYCOL 3350 17 G: 17 POWDER, FOR SOLUTION ORAL at 09:21

## 2017-07-28 ENCOUNTER — GENERIC CONVERSION - ENCOUNTER (OUTPATIENT)
Dept: OTHER | Facility: OTHER | Age: 42
End: 2017-07-28

## 2017-07-28 PROBLEM — E08.9 DIABETES MELLITUS DUE TO UNDERLYING CONDITION (HCC): Status: ACTIVE | Noted: 2017-07-28

## 2017-07-28 PROBLEM — E87.6 HYPOKALEMIA: Status: RESOLVED | Noted: 2017-07-26 | Resolved: 2017-07-28

## 2017-07-28 LAB
AFP-TM SERPL-MCNC: 4.7 NG/ML (ref 0–8.3)
GLUCOSE SERPL-MCNC: 148 MG/DL (ref 65–140)
GLUCOSE SERPL-MCNC: 164 MG/DL (ref 65–140)
GLUCOSE SERPL-MCNC: 180 MG/DL (ref 65–140)
GLUCOSE SERPL-MCNC: 208 MG/DL (ref 65–140)

## 2017-07-28 PROCEDURE — 82948 REAGENT STRIP/BLOOD GLUCOSE: CPT

## 2017-07-28 PROCEDURE — C9113 INJ PANTOPRAZOLE SODIUM, VIA: HCPCS | Performed by: HOSPITALIST

## 2017-07-28 RX ORDER — PANTOPRAZOLE SODIUM 40 MG/1
40 TABLET, DELAYED RELEASE ORAL
Status: DISCONTINUED | OUTPATIENT
Start: 2017-07-28 | End: 2017-07-30 | Stop reason: HOSPADM

## 2017-07-28 RX ADMIN — KETOROLAC TROMETHAMINE 15 MG: 30 INJECTION, SOLUTION INTRAMUSCULAR at 18:28

## 2017-07-28 RX ADMIN — PANTOPRAZOLE SODIUM 40 MG: 40 INJECTION, POWDER, FOR SOLUTION INTRAVENOUS at 08:50

## 2017-07-28 RX ADMIN — PANTOPRAZOLE SODIUM 40 MG: 40 TABLET, DELAYED RELEASE ORAL at 17:18

## 2017-07-28 RX ADMIN — AMLODIPINE BESYLATE 10 MG: 10 TABLET ORAL at 08:49

## 2017-07-28 RX ADMIN — SODIUM CHLORIDE 75 ML/HR: 0.9 INJECTION, SOLUTION INTRAVENOUS at 18:28

## 2017-07-28 RX ADMIN — ENOXAPARIN SODIUM 40 MG: 40 INJECTION SUBCUTANEOUS at 08:49

## 2017-07-28 RX ADMIN — KETOROLAC TROMETHAMINE 15 MG: 30 INJECTION, SOLUTION INTRAMUSCULAR at 08:50

## 2017-07-28 RX ADMIN — INSULIN LISPRO 1 UNITS: 100 INJECTION, SOLUTION INTRAVENOUS; SUBCUTANEOUS at 21:25

## 2017-07-28 RX ADMIN — SODIUM CHLORIDE 75 ML/HR: 0.9 INJECTION, SOLUTION INTRAVENOUS at 06:25

## 2017-07-28 RX ADMIN — INSULIN LISPRO 1 UNITS: 100 INJECTION, SOLUTION INTRAVENOUS; SUBCUTANEOUS at 11:18

## 2017-07-28 RX ADMIN — FLUTICASONE PROPIONATE 2 PUFF: 220 AEROSOL, METERED RESPIRATORY (INHALATION) at 08:51

## 2017-07-28 RX ADMIN — OXYCODONE HYDROCHLORIDE AND ACETAMINOPHEN 1 TABLET: 5; 325 TABLET ORAL at 02:48

## 2017-07-28 RX ADMIN — METFORMIN HYDROCHLORIDE 500 MG: 500 TABLET, FILM COATED ORAL at 17:18

## 2017-07-29 LAB
ERYTHROCYTE [DISTWIDTH] IN BLOOD BY AUTOMATED COUNT: 12.7 % (ref 11.6–15.1)
GLUCOSE SERPL-MCNC: 134 MG/DL (ref 65–140)
GLUCOSE SERPL-MCNC: 144 MG/DL (ref 65–140)
GLUCOSE SERPL-MCNC: 175 MG/DL (ref 65–140)
GLUCOSE SERPL-MCNC: 183 MG/DL (ref 65–140)
HCT VFR BLD AUTO: 41.6 % (ref 36.5–49.3)
HGB BLD-MCNC: 14.2 G/DL (ref 12–17)
MCH RBC QN AUTO: 26.5 PG (ref 26.8–34.3)
MCHC RBC AUTO-ENTMCNC: 34.1 G/DL (ref 31.4–37.4)
MCV RBC AUTO: 78 FL (ref 82–98)
PLATELET # BLD AUTO: 188 THOUSANDS/UL (ref 149–390)
PMV BLD AUTO: 10.4 FL (ref 8.9–12.7)
RBC # BLD AUTO: 5.36 MILLION/UL (ref 3.88–5.62)
WBC # BLD AUTO: 4.61 THOUSAND/UL (ref 4.31–10.16)

## 2017-07-29 PROCEDURE — 85027 COMPLETE CBC AUTOMATED: CPT | Performed by: INTERNAL MEDICINE

## 2017-07-29 PROCEDURE — 82948 REAGENT STRIP/BLOOD GLUCOSE: CPT

## 2017-07-29 RX ORDER — MORPHINE SULFATE 2 MG/ML
2 INJECTION, SOLUTION INTRAMUSCULAR; INTRAVENOUS EVERY 4 HOURS PRN
Status: DISCONTINUED | OUTPATIENT
Start: 2017-07-29 | End: 2017-07-30 | Stop reason: HOSPADM

## 2017-07-29 RX ORDER — POTASSIUM CHLORIDE 20 MEQ/1
40 TABLET, EXTENDED RELEASE ORAL ONCE
Status: COMPLETED | OUTPATIENT
Start: 2017-07-29 | End: 2017-07-29

## 2017-07-29 RX ORDER — OXYCODONE HYDROCHLORIDE 5 MG/1
5 TABLET ORAL EVERY 4 HOURS PRN
Status: DISCONTINUED | OUTPATIENT
Start: 2017-07-29 | End: 2017-07-30 | Stop reason: HOSPADM

## 2017-07-29 RX ADMIN — SODIUM CHLORIDE 75 ML/HR: 0.9 INJECTION, SOLUTION INTRAVENOUS at 06:09

## 2017-07-29 RX ADMIN — INSULIN LISPRO 1 UNITS: 100 INJECTION, SOLUTION INTRAVENOUS; SUBCUTANEOUS at 22:00

## 2017-07-29 RX ADMIN — POTASSIUM CHLORIDE 40 MEQ: 1500 TABLET, EXTENDED RELEASE ORAL at 13:10

## 2017-07-29 RX ADMIN — AMLODIPINE BESYLATE 10 MG: 10 TABLET ORAL at 08:06

## 2017-07-29 RX ADMIN — SODIUM CHLORIDE 75 ML/HR: 0.9 INJECTION, SOLUTION INTRAVENOUS at 18:34

## 2017-07-29 RX ADMIN — METFORMIN HYDROCHLORIDE 500 MG: 500 TABLET, FILM COATED ORAL at 08:06

## 2017-07-29 RX ADMIN — INSULIN LISPRO 1 UNITS: 100 INJECTION, SOLUTION INTRAVENOUS; SUBCUTANEOUS at 12:04

## 2017-07-29 RX ADMIN — FLUTICASONE PROPIONATE 2 PUFF: 220 AEROSOL, METERED RESPIRATORY (INHALATION) at 22:00

## 2017-07-29 RX ADMIN — METFORMIN HYDROCHLORIDE 500 MG: 500 TABLET, FILM COATED ORAL at 16:56

## 2017-07-29 RX ADMIN — ENOXAPARIN SODIUM 40 MG: 40 INJECTION SUBCUTANEOUS at 08:06

## 2017-07-29 RX ADMIN — KETOROLAC TROMETHAMINE 15 MG: 30 INJECTION, SOLUTION INTRAMUSCULAR at 00:18

## 2017-07-29 RX ADMIN — OXYCODONE HYDROCHLORIDE AND ACETAMINOPHEN 1 TABLET: 5; 325 TABLET ORAL at 03:54

## 2017-07-29 RX ADMIN — FLUTICASONE PROPIONATE 2 PUFF: 220 AEROSOL, METERED RESPIRATORY (INHALATION) at 08:06

## 2017-07-29 RX ADMIN — OXYCODONE HYDROCHLORIDE 5 MG: 5 TABLET ORAL at 13:10

## 2017-07-29 RX ADMIN — PANTOPRAZOLE SODIUM 40 MG: 40 TABLET, DELAYED RELEASE ORAL at 16:56

## 2017-07-29 RX ADMIN — PANTOPRAZOLE SODIUM 40 MG: 40 TABLET, DELAYED RELEASE ORAL at 06:07

## 2017-07-30 VITALS
BODY MASS INDEX: 30.78 KG/M2 | TEMPERATURE: 98 F | WEIGHT: 215 LBS | RESPIRATION RATE: 16 BRPM | DIASTOLIC BLOOD PRESSURE: 81 MMHG | OXYGEN SATURATION: 96 % | HEART RATE: 77 BPM | HEIGHT: 70 IN | SYSTOLIC BLOOD PRESSURE: 122 MMHG

## 2017-07-30 PROBLEM — R10.9 ABDOMINAL PAIN: Status: RESOLVED | Noted: 2017-07-25 | Resolved: 2017-07-30

## 2017-07-30 LAB
ANION GAP SERPL CALCULATED.3IONS-SCNC: 7 MMOL/L (ref 4–13)
BUN SERPL-MCNC: 8 MG/DL (ref 5–25)
CALCIUM SERPL-MCNC: 9 MG/DL (ref 8.3–10.1)
CHLORIDE SERPL-SCNC: 108 MMOL/L (ref 100–108)
CO2 SERPL-SCNC: 24 MMOL/L (ref 21–32)
CREAT SERPL-MCNC: 0.85 MG/DL (ref 0.6–1.3)
ELASTASE PANC STL-MCNT: NORMAL UG ELAST./G
ERYTHROCYTE [DISTWIDTH] IN BLOOD BY AUTOMATED COUNT: 12.7 % (ref 11.6–15.1)
GFR SERPL CREATININE-BSD FRML MDRD: 123 ML/MIN/1.73SQ M
GLUCOSE SERPL-MCNC: 140 MG/DL (ref 65–140)
GLUCOSE SERPL-MCNC: 143 MG/DL (ref 65–140)
GLUCOSE SERPL-MCNC: 211 MG/DL (ref 65–140)
HCT VFR BLD AUTO: 40.9 % (ref 36.5–49.3)
HGB BLD-MCNC: 13.9 G/DL (ref 12–17)
MAGNESIUM SERPL-MCNC: 2.2 MG/DL (ref 1.6–2.6)
MCH RBC QN AUTO: 26.5 PG (ref 26.8–34.3)
MCHC RBC AUTO-ENTMCNC: 34 G/DL (ref 31.4–37.4)
MCV RBC AUTO: 78 FL (ref 82–98)
PLATELET # BLD AUTO: 161 THOUSANDS/UL (ref 149–390)
PMV BLD AUTO: 11.1 FL (ref 8.9–12.7)
POTASSIUM SERPL-SCNC: 3.6 MMOL/L (ref 3.5–5.3)
RBC # BLD AUTO: 5.25 MILLION/UL (ref 3.88–5.62)
SODIUM SERPL-SCNC: 139 MMOL/L (ref 136–145)
WBC # BLD AUTO: 3.99 THOUSAND/UL (ref 4.31–10.16)

## 2017-07-30 PROCEDURE — 83735 ASSAY OF MAGNESIUM: CPT | Performed by: INTERNAL MEDICINE

## 2017-07-30 PROCEDURE — 85027 COMPLETE CBC AUTOMATED: CPT | Performed by: INTERNAL MEDICINE

## 2017-07-30 PROCEDURE — 82948 REAGENT STRIP/BLOOD GLUCOSE: CPT

## 2017-07-30 PROCEDURE — 80048 BASIC METABOLIC PNL TOTAL CA: CPT | Performed by: INTERNAL MEDICINE

## 2017-07-30 RX ORDER — PANTOPRAZOLE SODIUM 40 MG/1
40 TABLET, DELAYED RELEASE ORAL
Qty: 30 TABLET | Refills: 0 | Status: SHIPPED | OUTPATIENT
Start: 2017-07-30 | End: 2017-10-02

## 2017-07-30 RX ORDER — POLYETHYLENE GLYCOL 3350 17 G/17G
17 POWDER, FOR SOLUTION ORAL DAILY
Qty: 14 EACH | Refills: 0 | Status: SHIPPED | OUTPATIENT
Start: 2017-07-30 | End: 2017-10-02

## 2017-07-30 RX ORDER — PREDNISONE 10 MG/1
TABLET ORAL
Qty: 40 TABLET | Refills: 0 | Status: SHIPPED | OUTPATIENT
Start: 2017-07-30 | End: 2018-05-13 | Stop reason: ALTCHOICE

## 2017-07-30 RX ADMIN — INSULIN LISPRO 1 UNITS: 100 INJECTION, SOLUTION INTRAVENOUS; SUBCUTANEOUS at 11:51

## 2017-07-30 RX ADMIN — ENOXAPARIN SODIUM 40 MG: 40 INJECTION SUBCUTANEOUS at 08:49

## 2017-07-30 RX ADMIN — METFORMIN HYDROCHLORIDE 500 MG: 500 TABLET, FILM COATED ORAL at 08:49

## 2017-07-30 RX ADMIN — SODIUM CHLORIDE 75 ML/HR: 0.9 INJECTION, SOLUTION INTRAVENOUS at 06:00

## 2017-07-30 RX ADMIN — PANTOPRAZOLE SODIUM 40 MG: 40 TABLET, DELAYED RELEASE ORAL at 06:00

## 2017-07-30 RX ADMIN — FLUTICASONE PROPIONATE 2 PUFF: 220 AEROSOL, METERED RESPIRATORY (INHALATION) at 08:49

## 2017-07-30 RX ADMIN — AMLODIPINE BESYLATE 10 MG: 10 TABLET ORAL at 08:49

## 2017-08-03 ENCOUNTER — ALLSCRIPTS OFFICE VISIT (OUTPATIENT)
Dept: OTHER | Facility: OTHER | Age: 42
End: 2017-08-03

## 2017-09-14 ENCOUNTER — HOSPITAL ENCOUNTER (OUTPATIENT)
Facility: HOSPITAL | Age: 42
Setting detail: OUTPATIENT SURGERY
Discharge: HOME | End: 2017-09-14
Attending: INTERNAL MEDICINE | Admitting: INTERNAL MEDICINE
Payer: COMMERCIAL

## 2017-09-14 ENCOUNTER — TRANSCRIBE ORDERS (OUTPATIENT)
Dept: ADMINISTRATIVE | Facility: HOSPITAL | Age: 42
End: 2017-09-14

## 2017-09-14 ENCOUNTER — ANESTHESIA EVENT (OUTPATIENT)
Dept: GASTROENTEROLOGY | Facility: HOSPITAL | Age: 42
End: 2017-09-14
Payer: COMMERCIAL

## 2017-09-14 ENCOUNTER — GENERIC CONVERSION - ENCOUNTER (OUTPATIENT)
Dept: OTHER | Facility: OTHER | Age: 42
End: 2017-09-14

## 2017-09-14 ENCOUNTER — ANESTHESIA (OUTPATIENT)
Dept: GASTROENTEROLOGY | Facility: HOSPITAL | Age: 42
End: 2017-09-14
Payer: COMMERCIAL

## 2017-09-14 VITALS
HEIGHT: 70 IN | BODY MASS INDEX: 30.78 KG/M2 | TEMPERATURE: 98.3 F | DIASTOLIC BLOOD PRESSURE: 76 MMHG | RESPIRATION RATE: 16 BRPM | OXYGEN SATURATION: 99 % | HEART RATE: 78 BPM | WEIGHT: 215 LBS | SYSTOLIC BLOOD PRESSURE: 129 MMHG

## 2017-09-14 DIAGNOSIS — K86.2 CYST AND PSEUDOCYST OF PANCREAS: Primary | ICD-10-CM

## 2017-09-14 DIAGNOSIS — K86.3 PSEUDOCYST OF PANCREAS: ICD-10-CM

## 2017-09-14 DIAGNOSIS — K86.3 CYST AND PSEUDOCYST OF PANCREAS: Primary | ICD-10-CM

## 2017-09-14 DIAGNOSIS — K22.2 ESOPHAGEAL STRICTURE: ICD-10-CM

## 2017-09-14 PROCEDURE — 88305 TISSUE EXAM BY PATHOLOGIST: CPT | Performed by: INTERNAL MEDICINE

## 2017-09-14 RX ORDER — PROPOFOL 10 MG/ML
INJECTION, EMULSION INTRAVENOUS AS NEEDED
Status: DISCONTINUED | OUTPATIENT
Start: 2017-09-14 | End: 2017-09-14 | Stop reason: SURG

## 2017-09-14 RX ORDER — SODIUM CHLORIDE 9 MG/ML
125 INJECTION, SOLUTION INTRAVENOUS CONTINUOUS
Status: DISCONTINUED | OUTPATIENT
Start: 2017-09-14 | End: 2017-09-14

## 2017-09-14 RX ORDER — PROPOFOL 10 MG/ML
INJECTION, EMULSION INTRAVENOUS CONTINUOUS PRN
Status: DISCONTINUED | OUTPATIENT
Start: 2017-09-14 | End: 2017-09-14 | Stop reason: SURG

## 2017-09-14 RX ORDER — LIDOCAINE HYDROCHLORIDE 10 MG/ML
INJECTION, SOLUTION INFILTRATION; PERINEURAL AS NEEDED
Status: DISCONTINUED | OUTPATIENT
Start: 2017-09-14 | End: 2017-09-14 | Stop reason: SURG

## 2017-09-14 RX ADMIN — LIDOCAINE HYDROCHLORIDE 100 MG: 10 INJECTION, SOLUTION INFILTRATION; PERINEURAL at 13:28

## 2017-09-14 RX ADMIN — PROPOFOL 50 MG: 10 INJECTION, EMULSION INTRAVENOUS at 13:33

## 2017-09-14 RX ADMIN — PROPOFOL 50 MG: 10 INJECTION, EMULSION INTRAVENOUS at 13:31

## 2017-09-14 RX ADMIN — PROPOFOL 140 MCG/KG/MIN: 10 INJECTION, EMULSION INTRAVENOUS at 13:28

## 2017-09-14 RX ADMIN — SODIUM CHLORIDE 125 ML/HR: 0.9 INJECTION, SOLUTION INTRAVENOUS at 12:49

## 2017-09-14 RX ADMIN — PROPOFOL 100 MG: 10 INJECTION, EMULSION INTRAVENOUS at 13:42

## 2017-09-14 RX ADMIN — PROPOFOL 100 MG: 10 INJECTION, EMULSION INTRAVENOUS at 13:28

## 2017-09-14 RX ADMIN — LIDOCAINE HYDROCHLORIDE 50 MG: 10 INJECTION, SOLUTION INFILTRATION; PERINEURAL at 13:42

## 2017-09-15 ENCOUNTER — GENERIC CONVERSION - ENCOUNTER (OUTPATIENT)
Dept: OTHER | Facility: OTHER | Age: 42
End: 2017-09-15

## 2017-09-18 ENCOUNTER — GENERIC CONVERSION - ENCOUNTER (OUTPATIENT)
Dept: OTHER | Facility: OTHER | Age: 42
End: 2017-09-18

## 2017-09-22 ENCOUNTER — HOSPITAL ENCOUNTER (OUTPATIENT)
Dept: RADIOLOGY | Facility: HOSPITAL | Age: 42
Discharge: HOME | End: 2017-09-22
Attending: INTERNAL MEDICINE
Payer: COMMERCIAL

## 2017-09-22 DIAGNOSIS — K86.3 PSEUDOCYST OF PANCREAS: ICD-10-CM

## 2017-09-22 PROCEDURE — 74177 CT ABD & PELVIS W/CONTRAST: CPT

## 2017-09-22 RX ADMIN — IOHEXOL 100 ML: 350 INJECTION, SOLUTION INTRAVENOUS at 11:10

## 2017-09-25 ENCOUNTER — GENERIC CONVERSION - ENCOUNTER (OUTPATIENT)
Dept: OTHER | Facility: OTHER | Age: 42
End: 2017-09-25

## 2017-09-27 ENCOUNTER — GENERIC CONVERSION - ENCOUNTER (OUTPATIENT)
Dept: OTHER | Facility: OTHER | Age: 42
End: 2017-09-27

## 2017-10-02 ENCOUNTER — HOSPITAL ENCOUNTER (EMERGENCY)
Facility: HOSPITAL | Age: 42
Discharge: HOME | End: 2017-10-03
Attending: EMERGENCY MEDICINE | Admitting: EMERGENCY MEDICINE
Payer: COMMERCIAL

## 2017-10-02 DIAGNOSIS — K86.1 CHRONIC PANCREATITIS (HCC): ICD-10-CM

## 2017-10-02 DIAGNOSIS — R10.9 ABDOMINAL PAIN: Primary | ICD-10-CM

## 2017-10-02 DIAGNOSIS — E87.6 HYPOKALEMIA, GASTROINTESTINAL LOSSES: ICD-10-CM

## 2017-10-02 DIAGNOSIS — K20.0 ESOPHAGITIS, EOSINOPHILIC: ICD-10-CM

## 2017-10-02 DIAGNOSIS — K86.3 PANCREATIC PSEUDOCYST: ICD-10-CM

## 2017-10-02 PROCEDURE — 85025 COMPLETE CBC W/AUTO DIFF WBC: CPT | Performed by: EMERGENCY MEDICINE

## 2017-10-02 PROCEDURE — 85610 PROTHROMBIN TIME: CPT | Performed by: EMERGENCY MEDICINE

## 2017-10-02 PROCEDURE — 83605 ASSAY OF LACTIC ACID: CPT | Performed by: EMERGENCY MEDICINE

## 2017-10-02 PROCEDURE — 80053 COMPREHEN METABOLIC PANEL: CPT | Performed by: EMERGENCY MEDICINE

## 2017-10-02 PROCEDURE — 85730 THROMBOPLASTIN TIME PARTIAL: CPT | Performed by: EMERGENCY MEDICINE

## 2017-10-02 PROCEDURE — 83690 ASSAY OF LIPASE: CPT | Performed by: EMERGENCY MEDICINE

## 2017-10-02 PROCEDURE — 93005 ELECTROCARDIOGRAM TRACING: CPT | Performed by: EMERGENCY MEDICINE

## 2017-10-02 PROCEDURE — 96360 HYDRATION IV INFUSION INIT: CPT

## 2017-10-02 PROCEDURE — 36415 COLL VENOUS BLD VENIPUNCTURE: CPT | Performed by: EMERGENCY MEDICINE

## 2017-10-02 RX ORDER — MAGNESIUM HYDROXIDE/ALUMINUM HYDROXICE/SIMETHICONE 120; 1200; 1200 MG/30ML; MG/30ML; MG/30ML
30 SUSPENSION ORAL ONCE
Status: COMPLETED | OUTPATIENT
Start: 2017-10-02 | End: 2017-10-02

## 2017-10-02 RX ADMIN — SODIUM CHLORIDE 1000 ML: 0.9 INJECTION, SOLUTION INTRAVENOUS at 23:59

## 2017-10-02 RX ADMIN — LIDOCAINE HYDROCHLORIDE 10 ML: 20 SOLUTION ORAL; TOPICAL at 23:59

## 2017-10-02 RX ADMIN — ALUMINUM HYDROXIDE, MAGNESIUM HYDROXIDE, AND SIMETHICONE 30 ML: 200; 200; 20 SUSPENSION ORAL at 23:59

## 2017-10-03 ENCOUNTER — APPOINTMENT (EMERGENCY)
Dept: RADIOLOGY | Facility: HOSPITAL | Age: 42
End: 2017-10-03
Payer: COMMERCIAL

## 2017-10-03 VITALS
RESPIRATION RATE: 22 BRPM | OXYGEN SATURATION: 97 % | BODY MASS INDEX: 30.13 KG/M2 | SYSTOLIC BLOOD PRESSURE: 137 MMHG | WEIGHT: 210 LBS | HEART RATE: 84 BPM | TEMPERATURE: 97.2 F | DIASTOLIC BLOOD PRESSURE: 81 MMHG

## 2017-10-03 LAB
ALBUMIN SERPL BCP-MCNC: 3.8 G/DL (ref 3.5–5)
ALP SERPL-CCNC: 133 U/L (ref 46–116)
ALT SERPL W P-5'-P-CCNC: 60 U/L (ref 12–78)
ANION GAP SERPL CALCULATED.3IONS-SCNC: 8 MMOL/L (ref 4–13)
APTT PPP: 30 SECONDS (ref 23–35)
AST SERPL W P-5'-P-CCNC: 16 U/L (ref 5–45)
ATRIAL RATE: 88 BPM
BASOPHILS # BLD AUTO: 0.01 THOUSANDS/ΜL (ref 0–0.1)
BASOPHILS NFR BLD AUTO: 0 % (ref 0–1)
BILIRUB SERPL-MCNC: 0.52 MG/DL (ref 0.2–1)
BUN SERPL-MCNC: 10 MG/DL (ref 5–25)
CALCIUM SERPL-MCNC: 8.8 MG/DL (ref 8.3–10.1)
CHLORIDE SERPL-SCNC: 104 MMOL/L (ref 100–108)
CO2 SERPL-SCNC: 28 MMOL/L (ref 21–32)
CREAT SERPL-MCNC: 0.91 MG/DL (ref 0.6–1.3)
EOSINOPHIL # BLD AUTO: 0.2 THOUSAND/ΜL (ref 0–0.61)
EOSINOPHIL NFR BLD AUTO: 5 % (ref 0–6)
ERYTHROCYTE [DISTWIDTH] IN BLOOD BY AUTOMATED COUNT: 13.5 % (ref 11.6–15.1)
GFR SERPL CREATININE-BSD FRML MDRD: 119 ML/MIN/1.73SQ M
GLUCOSE SERPL-MCNC: 173 MG/DL (ref 65–140)
HCT VFR BLD AUTO: 41.2 % (ref 36.5–49.3)
HGB BLD-MCNC: 13.6 G/DL (ref 12–17)
INR PPP: 1 (ref 0.86–1.16)
LACTATE SERPL-SCNC: 0.9 MMOL/L (ref 0.5–2)
LIPASE SERPL-CCNC: 204 U/L (ref 73–393)
LYMPHOCYTES # BLD AUTO: 0.63 THOUSANDS/ΜL (ref 0.6–4.47)
LYMPHOCYTES NFR BLD AUTO: 15 % (ref 14–44)
MCH RBC QN AUTO: 26.8 PG (ref 26.8–34.3)
MCHC RBC AUTO-ENTMCNC: 33 G/DL (ref 31.4–37.4)
MCV RBC AUTO: 81 FL (ref 82–98)
MONOCYTES # BLD AUTO: 0.51 THOUSAND/ΜL (ref 0.17–1.22)
MONOCYTES NFR BLD AUTO: 12 % (ref 4–12)
NEUTROPHILS # BLD AUTO: 2.98 THOUSANDS/ΜL (ref 1.85–7.62)
NEUTS SEG NFR BLD AUTO: 68 % (ref 43–75)
NRBC BLD AUTO-RTO: 0 /100 WBCS
P AXIS: 50 DEGREES
PLATELET # BLD AUTO: 147 THOUSANDS/UL (ref 149–390)
PMV BLD AUTO: 10.3 FL (ref 8.9–12.7)
POTASSIUM SERPL-SCNC: 3.2 MMOL/L (ref 3.5–5.3)
PR INTERVAL: 184 MS
PROT SERPL-MCNC: 7.7 G/DL (ref 6.4–8.2)
PROTHROMBIN TIME: 13.2 SECONDS (ref 12.1–14.4)
QRS AXIS: 44 DEGREES
QRSD INTERVAL: 88 MS
QT INTERVAL: 344 MS
QTC INTERVAL: 416 MS
RBC # BLD AUTO: 5.07 MILLION/UL (ref 3.88–5.62)
SODIUM SERPL-SCNC: 140 MMOL/L (ref 136–145)
SPECIMEN SOURCE: NORMAL
T WAVE AXIS: -14 DEGREES
TROPONIN I BLD-MCNC: 0.01 NG/ML (ref 0–0.08)
VENTRICULAR RATE: 88 BPM
WBC # BLD AUTO: 4.35 THOUSAND/UL (ref 4.31–10.16)

## 2017-10-03 PROCEDURE — 74177 CT ABD & PELVIS W/CONTRAST: CPT

## 2017-10-03 PROCEDURE — 84484 ASSAY OF TROPONIN QUANT: CPT

## 2017-10-03 PROCEDURE — 96361 HYDRATE IV INFUSION ADD-ON: CPT

## 2017-10-03 PROCEDURE — 99284 EMERGENCY DEPT VISIT MOD MDM: CPT

## 2017-10-03 RX ORDER — POTASSIUM CHLORIDE 20 MEQ/1
40 TABLET, EXTENDED RELEASE ORAL ONCE
Status: COMPLETED | OUTPATIENT
Start: 2017-10-03 | End: 2017-10-03

## 2017-10-03 RX ADMIN — POTASSIUM CHLORIDE 40 MEQ: 1500 TABLET, EXTENDED RELEASE ORAL at 02:07

## 2017-10-03 RX ADMIN — IOHEXOL 100 ML: 350 INJECTION, SOLUTION INTRAVENOUS at 01:42

## 2017-10-03 NOTE — DISCHARGE INSTRUCTIONS
Abdominal Pain   WHAT YOU NEED TO KNOW:   Abdominal pain can be dull, achy, or sharp  You may have pain in one area of your abdomen, or in your entire abdomen  Your pain may be caused by a condition such as constipation, food sensitivity or poisoning, infection, or a blockage  Abdominal pain can also be from a hernia, appendicitis, or an ulcer  Liver, gallbladder, or kidney conditions can also cause abdominal pain  The cause of your abdominal pain may be unknown  DISCHARGE INSTRUCTIONS:   Return to the emergency department if:   · You have new chest pain or shortness of breath  · You have pulsing pain in your upper abdomen or lower back that suddenly becomes constant  · Your pain is in the right lower abdominal area and worsens with movement  · You have a fever over 100 4°F (38°C) or shaking chills  · You are vomiting and cannot keep food or liquids down  · Your pain does not improve or gets worse over the next 8 to 12 hours  · You see blood in your vomit or bowel movements, or they look black and tarry  · Your skin or the whites of your eyes turn yellow  · You are a woman and have a large amount of vaginal bleeding that is not your monthly period  Contact your healthcare provider if:   · You have pain in your lower back  · You are a man and have pain in your testicles  · You have pain when you urinate  · You have questions or concerns about your condition or care  Follow up with your healthcare provider within 24 hours or as directed:  Write down your questions so you remember to ask them during your visits  Medicines:   · Medicines  may be given to calm your stomach and prevent vomiting or to decrease pain  Ask how to take pain medicine safely  · Take your medicine as directed  Contact your healthcare provider if you think your medicine is not helping or if you have side effects  Tell him of her if you are allergic to any medicine   Keep a list of the medicines, vitamins, and herbs you take  Include the amounts, and when and why you take them  Bring the list or the pill bottles to follow-up visits  Carry your medicine list with you in case of an emergency  © 2017 [employer] Information is for End User's use only and may not be sold, redistributed or otherwise used for commercial purposes  All illustrations and images included in CareNotes® are the copyrighted property of [employer]  or Flaquito Henson  The above information is an  only  It is not intended as medical advice for individual conditions or treatments  Talk to your doctor, nurse or pharmacist before following any medical regimen to see if it is safe and effective for you

## 2017-10-03 NOTE — ED PROVIDER NOTES
History  Chief Complaint   Patient presents with    Abdominal Pain     diarrhea for 1 week and sharp abd pain for 3 days  pt vomitted 1 time     This is a 36 y o  old male who presents to the ED for evaluation of abdominal pain  Patient states he has over a week of diarrhea, multiple nonbloody episodes  No recent travles, not mucosuy  Recently developed abdominal pain, sharp, epigastric, worse with eating  Associated with nausea and vomiting  Long history of pancreatitis, requiring surgical intervention, and cholecystectomy  Also known to have a history of pancreatic pseudocyst status post cystgastrostomy  Patient recently had an EGD conducted by Dr Pallavi Kamara of Gastroenterology for routine surveillance of his eosinophilic esophagitis  Prior to Admission Medications   Prescriptions Last Dose Informant Patient Reported? Taking? amLODIPine (NORVASC) 10 mg tablet   Yes Yes   Sig: Take 10 mg by mouth daily   metFORMIN (GLUCOPHAGE) 500 mg tablet   No Yes   Sig: Take 1 tablet by mouth 2 (two) times a day with meals   predniSONE 10 mg tablet   No No   Si tab bid for a week, then 1 tab daily      Facility-Administered Medications: None     Past Medical History:   Diagnosis Date    Diabetes mellitus     pre-diabetic     Hypertension     Pancreatitis     Portal vein thrombosis      Past Surgical History:   Procedure Laterality Date    ABDOMINAL SURGERY      CHOLECYSTECTOMY      AL EDG US EXAM SURGICAL ALTER STOM DUODENUM/JEJUNUM N/A 2017    Procedure: LINEAR ENDOSCOPIC U/S WITH AXIOS STENT;  Surgeon: Aurea Escalante MD;  Location: [facility]; Service: Gastroenterology    AL EDG US EXAM SURGICAL ALTER STOM DUODENUM/JEJUNUM N/A 2017    Procedure: LINEAR ENDOSCOPIC U/S POSSIBLE AXIOS;  Surgeon: Aurea Escalante MD;  Location: [facility]; Service: Gastroenterology     History reviewed  No pertinent family history  I have reviewed and agree with the history as documented      Social History   Substance Use Topics    Smoking status: Never Smoker    Smokeless tobacco: Never Used    Alcohol use Yes      Comment: socially -rarely      Review of Systems   Constitutional: Negative for chills, fatigue, fever and unexpected weight change  HENT: Negative for congestion, rhinorrhea and sore throat  Eyes: Negative for redness and visual disturbance  Respiratory: Negative for cough and shortness of breath  Cardiovascular: Negative for chest pain and leg swelling  Gastrointestinal: Positive for abdominal pain and diarrhea  Negative for constipation, nausea and vomiting  Endocrine: Negative for cold intolerance and heat intolerance  Genitourinary: Negative for dysuria, frequency and urgency  Musculoskeletal: Negative for back pain  Skin: Negative for rash  Neurological: Negative for dizziness, syncope and numbness  All other systems reviewed and are negative  Physical Exam  ED Triage Vitals [10/02/17 2210]   Temperature Pulse Respirations Blood Pressure SpO2   (!) 97 2 °F (36 2 °C) 99 18 140/81 97 %      Temp Source Heart Rate Source Patient Position - Orthostatic VS BP Location FiO2 (%)   Tympanic Monitor Sitting Left arm --      Pain Score       8         Physical Exam   Constitutional: He is oriented to person, place, and time  He appears well-developed and well-nourished  No distress  HENT:   Head: Normocephalic and atraumatic  Nose: Nose normal    Eyes: Conjunctivae and EOM are normal  Pupils are equal, round, and reactive to light  Neck: Normal range of motion  Neck supple  Cardiovascular: Normal rate, regular rhythm and normal heart sounds  Exam reveals no gallop  No murmur heard  Pulmonary/Chest: Effort normal and breath sounds normal  No respiratory distress  He has no wheezes  He has no rales  Abdominal: Bowel sounds are normal  He exhibits distension  He exhibits no mass  There is tenderness (diffuse)  There is no rebound and no guarding  Tense, not rigid     Musculoskeletal: Normal range of motion  He exhibits no edema or deformity  Lymphadenopathy:     He has no cervical adenopathy  Neurological: He is alert and oriented to person, place, and time  No cranial nerve deficit  Skin: Skin is warm and dry  No rash noted  He is not diaphoretic  No erythema  Psychiatric: He has a normal mood and affect  Nursing note and vitals reviewed        ED Medications  Medications   sodium chloride 0 9 % bolus 1,000 mL (0 mL Intravenous Stopped 10/3/17 0244)   lidocaine viscous (XYLOCAINE) 2 % mucosal solution 10 mL (10 mL Oral Given 10/2/17 2359)   aluminum-magnesium hydroxide-simethicone (MYLANTA) 200-200-20 mg/5 mL oral suspension 30 mL (30 mL Oral Given 10/2/17 2359)   iohexol (OMNIPAQUE) 350 MG/ML injection (MULTI-DOSE) 100 mL (100 mL Intravenous Given 10/3/17 0142)   potassium chloride (K-DUR,KLOR-CON) CR tablet 40 mEq (40 mEq Oral Given 10/3/17 0207)       Diagnostic Studies  Labs Reviewed   CBC AND DIFFERENTIAL - Abnormal        Result Value Ref Range Status    MCV 81 (*) 82 - 98 fL Final    Platelets 426 (*) 499 - 390 Thousands/uL Final    WBC 4 35  4 31 - 10 16 Thousand/uL Final    RBC 5 07  3 88 - 5 62 Million/uL Final    Hemoglobin 13 6  12 0 - 17 0 g/dL Final    Hematocrit 41 2  36 5 - 49 3 % Final    MCH 26 8  26 8 - 34 3 pg Final    MCHC 33 0  31 4 - 37 4 g/dL Final    RDW 13 5  11 6 - 15 1 % Final    MPV 10 3  8 9 - 12 7 fL Final    nRBC 0  /100 WBCs Final    Neutrophils Relative 68  43 - 75 % Final    Lymphocytes Relative 15  14 - 44 % Final    Monocytes Relative 12  4 - 12 % Final    Eosinophils Relative 5  0 - 6 % Final    Basophils Relative 0  0 - 1 % Final    Neutrophils Absolute 2 98  1 85 - 7 62 Thousands/µL Final    Lymphocytes Absolute 0 63  0 60 - 4 47 Thousands/µL Final    Monocytes Absolute 0 51  0 17 - 1 22 Thousand/µL Final    Eosinophils Absolute 0 20  0 00 - 0 61 Thousand/µL Final    Basophils Absolute 0 01  0 00 - 0 10 Thousands/µL Final   COMPREHENSIVE METABOLIC PANEL - Abnormal     Potassium 3 2 (*) 3 5 - 5 3 mmol/L Final    Glucose 173 (*) 65 - 140 mg/dL Final    Comment:   If the patient is fasting, the ADA then defines impaired fasting glucose as > 100 mg/dL and diabetes as > or equal to 123 mg/dL  Specimen collection should occur prior to Sulfasalazine administration due to the potential for falsely depressed results  Specimen collection should occur prior to Sulfapyridine administration due to the potential for falsely elevated results  Alkaline Phosphatase 133 (*) 46 - 116 U/L Final    Sodium 140  136 - 145 mmol/L Final    Chloride 104  100 - 108 mmol/L Final    CO2 28  21 - 32 mmol/L Final    Anion Gap 8  4 - 13 mmol/L Final    BUN 10  5 - 25 mg/dL Final    Creatinine 0 91  0 60 - 1 30 mg/dL Final    Comment: Standardized to IDMS reference method    Calcium 8 8  8 3 - 10 1 mg/dL Final    AST 16  5 - 45 U/L Final    Comment:   Specimen collection should occur prior to Sulfasalazine administration due to the potential for falsely depressed results  ALT 60  12 - 78 U/L Final    Comment:   Specimen collection should occur prior to Sulfasalazine and/or Sulfapyridine administration due to the potential for falsely depressed results  Total Protein 7 7  6 4 - 8 2 g/dL Final    Albumin 3 8  3 5 - 5 0 g/dL Final    Total Bilirubin 0 52  0 20 - 1 00 mg/dL Final    eGFR 119  ml/min/1 73sq m Final    Narrative:     National Kidney Disease Education Program recommendations are as follows:  GFR calculation is accurate only with a steady state creatinine  Chronic Kidney disease less than 60 ml/min/1 73 sq  meters  Kidney failure less than 15 ml/min/1 73 sq  meters  LIPASE - Normal    Lipase 204  73 - 393 u/L Final   PROTIME-INR - Normal    Protime 13 2  12 1 - 14 4 seconds Final    INR 1 00  0 86 - 1 16 Final   APTT - Normal    PTT 30  23 - 35 seconds Final    Narrative:      Therapeutic Heparin Range = 60-90 seconds   LACTIC ACID, PLASMA - Normal    LACTIC ACID 0 9  0 5 - 2 0 mmol/L Final    Narrative:     Result may be elevated if tourniquet was used during collection  POCT TROPONIN - Normal    POC Troponin I 0 01  0 00 - 0 08 ng/ml Final    Specimen Type VENOUS   Final    Narrative:     [employer] i-Stat handheld analyzer 99% cutoff is > 0 08ng/mL in network Emergency Departments    o cTnI 99% cutoff is useful only when applied to patients in the clinical setting of myocardial ischemia  o cTnI 99% cutoff should be interpreted in the context of clinical history, ECG findings and possibly cardiac imaging to establish correct diagnosis  o cTnI 99% cutoff may be suggestive but clearly not indicative of a coronary event without the clinical setting of myocardial ischemia  CT abdomen pelvis w contrast   Final Result   Addendum 1 of 1   Given slight discrepancies in the final report and preliminary report, I    personally telephoned my findings to the patient's primary care physician    Dr Osorio Encinas approximately 5140 hrs on 10/3/2017  Final      Slight interval enlargement of pancreatic neck pseudocyst with residual peripancreatic inflammatory changes  There is a subtle cuff of low attenuation tissue surrounding the proximal portal vein along the pancreatic neck with increased narrowing of the    vein compared to prior studies  However, no significant change in extent of the low density tissue and a discrete focal mass is not apparent  This may represent postinflammatory scar tissue, however, an infiltrative neoplasm cannot be completely    excluded  Therefore close interval follow-up is advised  I would recommend a follow-up CT abdomen in 2 months to ensure stability  Increasing size of numerous mesenteric lymph nodes  Hepatosplenomegaly with diffuse fatty infiltration of the liver  Major findings are concordant with initial interpretation provided by Dr Sonny Arcos of [facility]           Workstation performed: [id number] Procedures  Procedures    Phone Consults  ED Phone Contact    ED Course    A/P: This is a 36 y o  male who presents to the ED for evaluation of abdominal pain  Concern for recurrent pancreatitis, small-bowel obstruction, or other intra-abdominal pathology  , other intra-abdominal pathology  We will check labs, CT scan of the abdomen pelvis with IV contrast   Symptom management, IV fluids  Re-evaluate  0200 CT scan with no acute abnormalities  There are multiple changes consistent with sequela of chronic pancreatitis, pancreatic pseudocyst   Labs are unremarkable  Recommend GI follow-up within the next 1-2 weeks for re-evaluation  Return precautions discussed with patient  Patient acknowledges receipt of same  We will discharge this patient home and he is in agreement with this plan  [facility]  CritCare Time    Disposition  Final diagnoses:   Abdominal pain   Esophagitis, eosinophilic   Pancreatic pseudocyst   Chronic pancreatitis   Hypokalemia, gastrointestinal losses     ED Disposition     ED Disposition Condition Comment    Discharge  Grupocaro Cortes discharge to home/self care  Condition at discharge: Stable        Follow-up Information     Follow up With Specialties Details Why Lux Headley MD Gastroenterology Schedule an appointment as soon as possible for a visit in 1 week Evaluation of pancreatic pseudocyst [address] [place] [address]  [phone]          Discharge Medication List as of 10/3/2017  2:33 AM      CONTINUE these medications which have NOT CHANGED    Details   amLODIPine (NORVASC) 10 mg tablet Take 10 mg by mouth daily, Historical Med      metFORMIN (GLUCOPHAGE) 500 mg tablet Take 1 tablet by mouth 2 (two) times a day with meals, Starting Sun 7/30/2017, Normal      predniSONE 10 mg tablet 1 tab bid for a week, then 1 tab daily, Normal           No discharge procedures on file      ED Provider  Attending physically available and evaluated Everardo Alanjamey Andrea Stager  I managed the patient along with the ED Attending      Electronically Signed by       Ash Cool MD  Resident  10/03/17 8940

## 2017-10-03 NOTE — ED ATTENDING ATTESTATION
Flakito John MD, saw and evaluated the patient  I have discussed the patient with the resident/non-physician practitioner and agree with the resident's/non-physician practitioner's findings, Plan of Care, and MDM as documented in the resident's/non-physician practitioner's note, except where noted  All available labs and Radiology studies were reviewed  At this point I agree with the current assessment done in the Emergency Department  I have conducted an independent evaluation of this patient including a focused history of:    Emergency Department Note- Lula Del Valle 36 y o  male MRN: [id number]    Unit/Bed#: ED 08 Encounter: [id number]    Lula Del Valle is a 36 y o  male who presents with   Chief Complaint   Patient presents with    Abdominal Pain     diarrhea for 1 week and sharp abd pain for 3 days  pt vomitted 1 time         History of Present Illness   HPI:  Lula Del Valle is a 36 y o  male who presents for evaluation of: Intermittent diarrhea for 1 week and abdominal pain that worsened tonight  The patient's diarrhea has been liquid brown stool; he has been having about 5 episodes per day  He denies any melena and hematochezia  His abdominal pain is primarily midepigastric and is reminiscent of his prior episodes of pancreatitis  The patient states that he gets recurrent pancreatitis, typically from alcohol  The patient has a history of cholecystectomy with pancreatic pseudocyst removal   The patient notes this pain was of moderate severity and sharp in the mid epigastric area  The patient feels that was triggered by eating earlier this evening  The patient denies any associated flank pain, hematuria, dysuria, fevers, chills, dyspnea, hemoptysis, sputum production, no central chest pain, leg swelling, and leg cramping  A 10 system review of systems is otherwise unremarkable      Review of Systems    Historical Information   Past Medical History:   Diagnosis Date    Diabetes mellitus pre-diabetic     Hypertension     Pancreatitis     Portal vein thrombosis      Past Surgical History:   Procedure Laterality Date    ABDOMINAL SURGERY      CHOLECYSTECTOMY      [place] EDG US EXAM SURGICAL ALTER STOM DUODENUM/JEJUNUM N/A 7/24/2017    Procedure: LINEAR ENDOSCOPIC U/S WITH AXIOS STENT;  Surgeon: Gurinder Mariano MD;  [facility]; Service: Gastroenterology    GA EDG US EXAM SURGICAL ALTER STOM DUODENUM/JEJUNUM N/A 9/14/2017    Procedure: LINEAR ENDOSCOPIC U/S POSSIBLE AXIOS;  Surgeon: Gurinder Mariano MD;  [facility]; Service: Gastroenterology     Social History   History   Alcohol Use    Yes     Comment: socially -rarely     History   Drug Use No     History   Smoking Status    Never Smoker   Smokeless Tobacco    Never Used     Family History: non-contributory    Meds/Allergies   all medications and allergies reviewed  No Known Allergies    Objective   First Vitals:   Blood Pressure: 140/81 (10/02/17 2210)  Pulse: 99 (10/02/17 2210)  Temperature: (!) 97 2 °F (36 2 °C) (10/02/17 2210)  Temp Source: Tympanic (10/02/17 2210)  Respirations: 18 (10/02/17 2210)  Weight - Scale: 95 3 kg (210 lb) (10/02/17 2210)  SpO2: 97 % (10/02/17 2210)    Current Vitals:   Blood Pressure: 137/81 (10/03/17 0150)  Pulse: 84 (10/03/17 0150)  Temperature: (!) 97 2 °F (36 2 °C) (10/02/17 2210)  Temp Source: Tympanic (10/02/17 2210)  Respirations: 22 (10/03/17 0150)  Weight - Scale: 95 3 kg (210 lb) (10/02/17 2210)  SpO2: 97 % (10/03/17 0150)      Intake/Output Summary (Last 24 hours) at 10/03/17 1547  Last data filed at 10/03/17 0244   Gross per 24 hour   Intake             2000 ml   Output                0 ml   Net             2000 ml       Invasive Devices     Peripheral Intravenous Line            Peripheral IV 10/02/17 Left Antecubital less than 1 day                Physical Exam   Constitutional: He is oriented to person, place, and time  He appears well-developed and well-nourished     HENT:   Head: Normocephalic and atraumatic  Abdominal: Soft  Bowel sounds are normal  He exhibits distension ( mild)  There is no tenderness  Musculoskeletal: Normal range of motion  He exhibits no deformity  Neurological: He is alert and oriented to person, place, and time  Skin: Skin is warm and dry  Psychiatric: He has a normal mood and affect  His behavior is normal  Judgment and thought content normal    Nursing note and vitals reviewed  Medical Decision Makin  Acute mid epigastric abdominal pain:  Plan to obtain a lipase to rule out pancreatitis; plan to obtain a complete metabolic profile to rule out hepatitis; plan to obtain a CT scan of the abdomen and pelvis to rule out a small-bowel obstruction; plan to obtain a CBC to rule out leukocytosis and anemia  2  Intermittent diarrhea:  I have discussed loperamide with the patient; he will take as out patient      Recent Results (from the past 36 hour(s))   CBC and differential    Collection Time: 10/02/17 11:58 PM   Result Value Ref Range    WBC 4 35 4 31 - 10 16 Thousand/uL    RBC 5 07 3 88 - 5 62 Million/uL    Hemoglobin 13 6 12 0 - 17 0 g/dL    Hematocrit 41 2 36 5 - 49 3 %    MCV 81 (L) 82 - 98 fL    MCH 26 8 26 8 - 34 3 pg    MCHC 33 0 31 4 - 37 4 g/dL    RDW 13 5 11 6 - 15 1 %    MPV 10 3 8 9 - 12 7 fL    Platelets 934 (L) 171 - 390 Thousands/uL    nRBC 0 /100 WBCs    Neutrophils Relative 68 43 - 75 %    Lymphocytes Relative 15 14 - 44 %    Monocytes Relative 12 4 - 12 %    Eosinophils Relative 5 0 - 6 %    Basophils Relative 0 0 - 1 %    Neutrophils Absolute 2 98 1 85 - 7 62 Thousands/µL    Lymphocytes Absolute 0 63 0 60 - 4 47 Thousands/µL    Monocytes Absolute 0 51 0 17 - 1 22 Thousand/µL    Eosinophils Absolute 0 20 0 00 - 0 61 Thousand/µL    Basophils Absolute 0 01 0 00 - 0 10 Thousands/µL   Comprehensive metabolic panel    Collection Time: 10/02/17 11:58 PM   Result Value Ref Range    Sodium 140 136 - 145 mmol/L    Potassium 3 2 (L) 3 5 - 5 3 mmol/L    Chloride 104 100 - 108 mmol/L    CO2 28 21 - 32 mmol/L    Anion Gap 8 4 - 13 mmol/L    BUN 10 5 - 25 mg/dL    Creatinine 0 91 0 60 - 1 30 mg/dL    Glucose 173 (H) 65 - 140 mg/dL    Calcium 8 8 8 3 - 10 1 mg/dL    AST 16 5 - 45 U/L    ALT 60 12 - 78 U/L    Alkaline Phosphatase 133 (H) 46 - 116 U/L    Total Protein 7 7 6 4 - 8 2 g/dL    Albumin 3 8 3 5 - 5 0 g/dL    Total Bilirubin 0 52 0 20 - 1 00 mg/dL    eGFR 119 ml/min/1 73sq m   Lipase    Collection Time: 10/02/17 11:58 PM   Result Value Ref Range    Lipase 204 73 - 393 u/L   Protime-INR    Collection Time: 10/02/17 11:58 PM   Result Value Ref Range    Protime 13 2 12 1 - 14 4 seconds    INR 1 00 0 86 - 1 16   APTT    Collection Time: 10/02/17 11:58 PM   Result Value Ref Range    PTT 30 23 - 35 seconds   Lactic acid, plasma    Collection Time: 10/02/17 11:58 PM   Result Value Ref Range    LACTIC ACID 0 9 0 5 - 2 0 mmol/L   POCT troponin    Collection Time: 10/03/17 12:04 AM   Result Value Ref Range    POC Troponin I 0 01 0 00 - 0 08 ng/ml    Specimen Type VENOUS    ECG 12 lead    Collection Time: 10/03/17 12:29 AM   Result Value Ref Range    Ventricular Rate 88 BPM    Atrial Rate 88 BPM    NC Interval 184 ms    QRSD Interval 88 ms    QT Interval 344 ms    QTC Interval 416 ms    P Axis 50 degrees    QRS Axis 44 degrees    T Wave Axis -14 degrees     CT abdomen pelvis w contrast   Final Result   Addendum 1 of 1   Given slight discrepancies in the final report and preliminary report, I    personally telephoned my findings to the patient's primary care physician    Dr Veliz Hearing approximately 2672 hrs on 10/3/2017  Final      Slight interval enlargement of pancreatic neck pseudocyst with residual peripancreatic inflammatory changes  There is a subtle cuff of low attenuation tissue surrounding the proximal portal vein along the pancreatic neck with increased narrowing of the    vein compared to prior studies    However, no significant change in extent of the low density tissue and a discrete focal mass is not apparent  This may represent postinflammatory scar tissue, however, an infiltrative neoplasm cannot be completely    excluded  Therefore close interval follow-up is advised  I would recommend a follow-up CT abdomen in 2 months to ensure stability  Increasing size of numerous mesenteric lymph nodes  Hepatosplenomegaly with diffuse fatty infiltration of the liver  Major findings are concordant with initial interpretation provided by Dr Margaret Le of [facility]  Workstation performed: [id number]               Portions of the record may have been created with voice recognition software  Occasional wrong word or "sound a like" substitutions may have occurred due to the inherent limitations of voice recognition software  Read the chart carefully and recognize, using context, where substitutions have occurred

## 2017-10-11 ENCOUNTER — ALLSCRIPTS OFFICE VISIT (OUTPATIENT)
Dept: OTHER | Facility: OTHER | Age: 42
End: 2017-10-11

## 2017-10-26 ENCOUNTER — HOSPITAL ENCOUNTER (EMERGENCY)
Facility: HOSPITAL | Age: 42
Discharge: HOME | End: 2017-10-27
Attending: EMERGENCY MEDICINE
Payer: COMMERCIAL

## 2017-10-26 DIAGNOSIS — R10.13 EPIGASTRIC PAIN: Primary | ICD-10-CM

## 2017-10-27 ENCOUNTER — APPOINTMENT (EMERGENCY)
Dept: RADIOLOGY | Facility: HOSPITAL | Age: 42
End: 2017-10-27
Payer: COMMERCIAL

## 2017-10-27 VITALS
SYSTOLIC BLOOD PRESSURE: 131 MMHG | DIASTOLIC BLOOD PRESSURE: 92 MMHG | TEMPERATURE: 97.5 F | OXYGEN SATURATION: 95 % | BODY MASS INDEX: 29.41 KG/M2 | WEIGHT: 205 LBS | RESPIRATION RATE: 18 BRPM | HEART RATE: 67 BPM

## 2017-10-27 LAB
ALBUMIN SERPL BCP-MCNC: 3.8 G/DL (ref 3.5–5)
ALP SERPL-CCNC: 138 U/L (ref 46–116)
ALT SERPL W P-5'-P-CCNC: 91 U/L (ref 12–78)
ANION GAP SERPL CALCULATED.3IONS-SCNC: 9 MMOL/L (ref 4–13)
AST SERPL W P-5'-P-CCNC: 32 U/L (ref 5–45)
BACTERIA UR QL AUTO: NORMAL /HPF
BASOPHILS # BLD AUTO: 0.02 THOUSANDS/ΜL (ref 0–0.1)
BASOPHILS NFR BLD AUTO: 0 % (ref 0–1)
BILIRUB SERPL-MCNC: 0.37 MG/DL (ref 0.2–1)
BILIRUB UR QL STRIP: NEGATIVE
BUN SERPL-MCNC: 11 MG/DL (ref 5–25)
CALCIUM SERPL-MCNC: 8.7 MG/DL (ref 8.3–10.1)
CHLORIDE SERPL-SCNC: 104 MMOL/L (ref 100–108)
CLARITY UR: CLEAR
CO2 SERPL-SCNC: 26 MMOL/L (ref 21–32)
COLOR UR: YELLOW
COLOR, POC: NORMAL
CREAT SERPL-MCNC: 0.81 MG/DL (ref 0.6–1.3)
EOSINOPHIL # BLD AUTO: 0.35 THOUSAND/ΜL (ref 0–0.61)
EOSINOPHIL NFR BLD AUTO: 8 % (ref 0–6)
ERYTHROCYTE [DISTWIDTH] IN BLOOD BY AUTOMATED COUNT: 13.6 % (ref 11.6–15.1)
GFR SERPL CREATININE-BSD FRML MDRD: 109 ML/MIN/1.73SQ M
GLUCOSE SERPL-MCNC: 162 MG/DL (ref 65–140)
GLUCOSE UR STRIP-MCNC: NEGATIVE MG/DL
HCT VFR BLD AUTO: 40.2 % (ref 36.5–49.3)
HGB BLD-MCNC: 14 G/DL (ref 12–17)
HGB UR QL STRIP.AUTO: ABNORMAL
HYALINE CASTS #/AREA URNS LPF: NORMAL /LPF
KETONES UR STRIP-MCNC: NEGATIVE MG/DL
LEUKOCYTE ESTERASE UR QL STRIP: NEGATIVE
LIPASE SERPL-CCNC: 225 U/L (ref 73–393)
LYMPHOCYTES # BLD AUTO: 0.97 THOUSANDS/ΜL (ref 0.6–4.47)
LYMPHOCYTES NFR BLD AUTO: 21 % (ref 14–44)
MCH RBC QN AUTO: 27.9 PG (ref 26.8–34.3)
MCHC RBC AUTO-ENTMCNC: 34.8 G/DL (ref 31.4–37.4)
MCV RBC AUTO: 80 FL (ref 82–98)
MONOCYTES # BLD AUTO: 0.43 THOUSAND/ΜL (ref 0.17–1.22)
MONOCYTES NFR BLD AUTO: 9 % (ref 4–12)
NEUTROPHILS # BLD AUTO: 2.9 THOUSANDS/ΜL (ref 1.85–7.62)
NEUTS SEG NFR BLD AUTO: 62 % (ref 43–75)
NITRITE UR QL STRIP: NEGATIVE
NON-SQ EPI CELLS URNS QL MICRO: NORMAL /HPF
NRBC BLD AUTO-RTO: 0 /100 WBCS
PH UR STRIP.AUTO: 7 [PH] (ref 4.5–8)
PLATELET # BLD AUTO: 144 THOUSANDS/UL (ref 149–390)
PMV BLD AUTO: 10 FL (ref 8.9–12.7)
POTASSIUM SERPL-SCNC: 3.4 MMOL/L (ref 3.5–5.3)
PROT SERPL-MCNC: 7.3 G/DL (ref 6.4–8.2)
PROT UR STRIP-MCNC: NEGATIVE MG/DL
RBC # BLD AUTO: 5.01 MILLION/UL (ref 3.88–5.62)
RBC #/AREA URNS AUTO: NORMAL /HPF
SODIUM SERPL-SCNC: 139 MMOL/L (ref 136–145)
SP GR UR STRIP.AUTO: 1.01 (ref 1–1.03)
UROBILINOGEN UR QL STRIP.AUTO: 0.2 E.U./DL
WBC # BLD AUTO: 4.69 THOUSAND/UL (ref 4.31–10.16)
WBC #/AREA URNS AUTO: NORMAL /HPF

## 2017-10-27 PROCEDURE — 96375 TX/PRO/DX INJ NEW DRUG ADDON: CPT

## 2017-10-27 PROCEDURE — 81002 URINALYSIS NONAUTO W/O SCOPE: CPT | Performed by: EMERGENCY MEDICINE

## 2017-10-27 PROCEDURE — 85025 COMPLETE CBC W/AUTO DIFF WBC: CPT | Performed by: EMERGENCY MEDICINE

## 2017-10-27 PROCEDURE — 80053 COMPREHEN METABOLIC PANEL: CPT | Performed by: EMERGENCY MEDICINE

## 2017-10-27 PROCEDURE — 96374 THER/PROPH/DIAG INJ IV PUSH: CPT

## 2017-10-27 PROCEDURE — 99284 EMERGENCY DEPT VISIT MOD MDM: CPT

## 2017-10-27 PROCEDURE — 74177 CT ABD & PELVIS W/CONTRAST: CPT

## 2017-10-27 PROCEDURE — 96361 HYDRATE IV INFUSION ADD-ON: CPT

## 2017-10-27 PROCEDURE — 36415 COLL VENOUS BLD VENIPUNCTURE: CPT

## 2017-10-27 PROCEDURE — 83690 ASSAY OF LIPASE: CPT | Performed by: EMERGENCY MEDICINE

## 2017-10-27 PROCEDURE — 81001 URINALYSIS AUTO W/SCOPE: CPT

## 2017-10-27 RX ORDER — KETOROLAC TROMETHAMINE 30 MG/ML
15 INJECTION, SOLUTION INTRAMUSCULAR; INTRAVENOUS ONCE
Status: COMPLETED | OUTPATIENT
Start: 2017-10-27 | End: 2017-10-27

## 2017-10-27 RX ORDER — MAGNESIUM HYDROXIDE/ALUMINUM HYDROXICE/SIMETHICONE 120; 1200; 1200 MG/30ML; MG/30ML; MG/30ML
30 SUSPENSION ORAL ONCE
Status: COMPLETED | OUTPATIENT
Start: 2017-10-27 | End: 2017-10-27

## 2017-10-27 RX ORDER — MORPHINE SULFATE 10 MG/ML
6 INJECTION, SOLUTION INTRAMUSCULAR; INTRAVENOUS ONCE
Status: COMPLETED | OUTPATIENT
Start: 2017-10-27 | End: 2017-10-27

## 2017-10-27 RX ORDER — FAMOTIDINE 20 MG/1
20 TABLET, FILM COATED ORAL 2 TIMES DAILY
Qty: 30 TABLET | Refills: 0 | Status: SHIPPED | OUTPATIENT
Start: 2017-10-27 | End: 2018-05-13 | Stop reason: ALTCHOICE

## 2017-10-27 RX ADMIN — ALUMINUM HYDROXIDE, MAGNESIUM HYDROXIDE, AND SIMETHICONE 30 ML: 200; 200; 20 SUSPENSION ORAL at 04:15

## 2017-10-27 RX ADMIN — IOHEXOL 100 ML: 350 INJECTION, SOLUTION INTRAVENOUS at 01:28

## 2017-10-27 RX ADMIN — KETOROLAC TROMETHAMINE 15 MG: 30 INJECTION, SOLUTION INTRAMUSCULAR at 00:37

## 2017-10-27 RX ADMIN — LIDOCAINE HYDROCHLORIDE 15 ML: 20 SOLUTION ORAL; TOPICAL at 04:15

## 2017-10-27 RX ADMIN — MORPHINE SULFATE 6 MG: 10 INJECTION, SOLUTION INTRAMUSCULAR; INTRAVENOUS at 00:39

## 2017-10-27 RX ADMIN — SODIUM CHLORIDE 1000 ML: 0.9 INJECTION, SOLUTION INTRAVENOUS at 00:37

## 2017-10-27 NOTE — ED ATTENDING ATTESTATION
Duane Luciano MD, saw and evaluated the patient  I have discussed the patient with the resident/non-physician practitioner and agree with the resident's/non-physician practitioner's findings, Plan of Care, and MDM as documented in the resident's/non-physician practitioner's note, except where noted  All available labs and Radiology studies were reviewed  At this point I agree with the current assessment done in the Emergency Department  I have conducted an independent evaluation of this patient including a focused history of:    Emergency Department Note- Beatris Walker 36 y o  male MRN: [id number]    Unit/Bed#: ED 29 Encounter: [id number]    Beatris Walker is a 36 y o  male who presents with   Chief Complaint   Patient presents with    Abdominal Pain     Per patient, "I've been having stomach pains since yesterday, I'm pretty sure it's pancreatitis, I had it in May " Denies n/v/d  History of Present Illness   HPI:  Beatris Walker is a 36 y o  male who presents for evaluation of:  Acute abdominal pain reminiscent of when he had pancreatitis  Pain is sharp and stabbing  Patient has not taken any medications for this pain  Patient has had some diarrhea  Patient has not any recent alcohol  Review of Systems    Historical Information   Past Medical History:   Diagnosis Date    Diabetes mellitus ([address] )     pre-diabetic     Hypertension     Pancreatitis     Portal vein thrombosis      Past Surgical History:   Procedure Laterality Date    ABDOMINAL SURGERY      CHOLECYSTECTOMY      [place] EDG US EXAM SURGICAL ALTER STOM DUODENUM/JEJUNUM N/A 7/24/2017    Procedure: LINEAR ENDOSCOPIC U/S WITH AXIOS STENT;  Surgeon: Guero Fay MD;  [facility]: [facility]; Service: Gastroenterology    [place] EDG US EXAM SURGICAL ALTER STOM DUODENUM/JEJUNUM N/A 9/14/2017    Procedure: LINEAR ENDOSCOPIC U/S POSSIBLE AXIOS;  Surgeon: Guero Fay MD;  Location: [facility];   Service: Gastroenterology     Social History   History Alcohol Use    Yes     Comment: socially -rarely     History   Drug Use No     History   Smoking Status    Never Smoker   Smokeless Tobacco    Never Used     Family History: non-contributory    Meds/Allergies   all medications and allergies reviewed  No Known Allergies    Objective   First Vitals:   Blood Pressure: 144/88 (10/26/17 2300)  Pulse: 82 (10/26/17 2300)  Temperature: 97 5 °F (36 4 °C) (10/26/17 2300)  Temp Source: Tympanic (10/26/17 2300)  Respirations: 16 (10/26/17 2300)  Weight - Scale: 93 kg (205 lb) (10/26/17 2300)  SpO2: 96 % (10/26/17 2300)    Current Vitals:   Blood Pressure: 131/92 (10/27/17 0415)  Pulse: 67 (10/27/17 0415)  Temperature: 97 5 °F (36 4 °C) (10/26/17 2300)  Temp Source: Tympanic (10/26/17 2300)  Respirations: 18 (10/27/17 041)  Weight - Scale: 93 kg (205 lb) (10/26/17 2300)  SpO2: 95 % (10/27/17 0415)      Intake/Output Summary (Last 24 hours) at 10/27/17 4056  Last data filed at 10/27/17 0237   Gross per 24 hour   Intake             1000 ml   Output                0 ml   Net             1000 ml       Invasive Devices     Peripheral Intravenous Line            Peripheral IV 10/02/17 Left Antecubital 24 days                Physical Exam   Constitutional: He is oriented to person, place, and time  He appears well-developed and well-nourished  HENT:   Head: Normocephalic and atraumatic  Neck: Normal range of motion  Neck supple  Abdominal: Soft  Bowel sounds are normal    Musculoskeletal: Normal range of motion  He exhibits no deformity  Neurological: He is alert and oriented to person, place, and time  Psychiatric: He has a normal mood and affect  His behavior is normal  Judgment and thought content normal    Nursing note and vitals reviewed  Medical Decision Makin    Acute abdominal pain:  in a patient with a history of pancreatitis-plan to obtain lipase to rule out pancreatitis; plan to obtain CT scan abdomen and pelvis to rule out recurrent pancreatitis and pancreatic pseudocyst; plan to obtain a CBC to rule out leukocytosis; plan to obtain complete metabolic profile to rule out hepatitis      Recent Results (from the past 36 hour(s))   CBC and differential    Collection Time: 10/27/17 12:00 AM   Result Value Ref Range    WBC 4 69 4 31 - 10 16 Thousand/uL    RBC 5 01 3 88 - 5 62 Million/uL    Hemoglobin 14 0 12 0 - 17 0 g/dL    Hematocrit 40 2 36 5 - 49 3 %    MCV 80 (L) 82 - 98 fL    MCH 27 9 26 8 - 34 3 pg    MCHC 34 8 31 4 - 37 4 g/dL    RDW 13 6 11 6 - 15 1 %    MPV 10 0 8 9 - 12 7 fL    Platelets 939 (L) 366 - 390 Thousands/uL    nRBC 0 /100 WBCs    Neutrophils Relative 62 43 - 75 %    Lymphocytes Relative 21 14 - 44 %    Monocytes Relative 9 4 - 12 %    Eosinophils Relative 8 (H) 0 - 6 %    Basophils Relative 0 0 - 1 %    Neutrophils Absolute 2 90 1 85 - 7 62 Thousands/µL    Lymphocytes Absolute 0 97 0 60 - 4 47 Thousands/µL    Monocytes Absolute 0 43 0 17 - 1 22 Thousand/µL    Eosinophils Absolute 0 35 0 00 - 0 61 Thousand/µL    Basophils Absolute 0 02 0 00 - 0 10 Thousands/µL   Comprehensive metabolic panel    Collection Time: 10/27/17 12:00 AM   Result Value Ref Range    Sodium 139 136 - 145 mmol/L    Potassium 3 4 (L) 3 5 - 5 3 mmol/L    Chloride 104 100 - 108 mmol/L    CO2 26 21 - 32 mmol/L    Anion Gap 9 4 - 13 mmol/L    BUN 11 5 - 25 mg/dL    Creatinine 0 81 0 60 - 1 30 mg/dL    Glucose 162 (H) 65 - 140 mg/dL    Calcium 8 7 8 3 - 10 1 mg/dL    AST 32 5 - 45 U/L    ALT 91 (H) 12 - 78 U/L    Alkaline Phosphatase 138 (H) 46 - 116 U/L    Total Protein 7 3 6 4 - 8 2 g/dL    Albumin 3 8 3 5 - 5 0 g/dL    Total Bilirubin 0 37 0 20 - 1 00 mg/dL    eGFR 109 ml/min/1 73sq m   Lipase    Collection Time: 10/27/17 12:00 AM   Result Value Ref Range    Lipase 225 73 - 393 u/L   POCT urinalysis dipstick    Collection Time: 10/27/17  3:29 AM   Result Value Ref Range    Color, UA yellow/clear    ED Urine Macroscopic    Collection Time: 10/27/17  3:32 AM   Result Value Ref Range    Color, UA Yellow     Clarity, UA Clear     pH, UA 7 0 4 5 - 8 0    Leukocytes, UA Negative Negative    Nitrite, UA Negative Negative    Protein, UA Negative Negative mg/dl    Glucose, UA Negative Negative mg/dl    Ketones, UA Negative Negative mg/dl    Urobilinogen, UA 0 2 0 2, 1 0 E U /dl E U /dl    Bilirubin, UA Negative Negative    Blood, UA Trace (A) Negative    Specific Gravity, UA 1 010 1 003 - 1 030   Urine Microscopic    Collection Time: 10/27/17  3:32 AM   Result Value Ref Range    RBC, UA None Seen None Seen, 0-5 /hpf    WBC, UA None Seen None Seen, 0-5, 5-55, 5-65 /hpf    Epithelial Cells None Seen None Seen, Occasional /hpf    Bacteria, UA None Seen None Seen, Occasional /hpf    Hyaline Casts, UA None Seen None Seen /lpf     CT abdomen pelvis w contrast   ED Interpretation   VRAD: FINDINGS:   Lower thorax: Mild bibasilar atelectasis  ABDOMEN:   Liver: Hepatic steatosis  Hepatosplenomegaly  Gallbladder and bile ducts: Status post cholecystectomy  No ductal dilation  Pancreas: Interval decrease in size of pancreatic pseudocyst at the region of the pancreatic neck   now measuring approximately 1 6 x 1 0 cm  Redemonstration of mild peripancreatic inflammatory   changes which may represent residual and/or recurrent acute pancreatitis  Stable mild pancreatic   ductal dilatation  Spleen: See above  Accessory splenule  Adrenals: Unremarkable  No mass  Kidneys and ureters: Unremarkable  No solid mass  No hydronephrosis  Stomach and bowel: Unremarkable  No obstruction  No mucosal thickening  Appendix: Visualized without findings to suggest acute appendicitis  PELVIS:   Bladder: Partially decompressed urinary bladder  Reproductive: Unremarkable as visualized  ABDOMEN and PELVIS:   Intraperitoneal space: Trace ascites  No free air  Jose Ley Accession: [id number] MRN: [id number]  Preliminary Radiology Report    (QA) DISCREPANCY?    If there is a discrepancy between the preliminary and final interpretation, please notify [facility] via [email or web address]  If you do not have access to our QA portal, call our QA team at [phone]   This report is intended only for the use of the referring physician, and only in accordance with law, If you received this in error, call [phone]   Page 2 of 2   Bones/joints: No acute fracture  No dislocation  Soft tissues: Fat containing umbilical hernia  Vasculature: Decreased narrowing at the portosplenic confluence  Stable collateral vessels  No   abdominal aortic aneurysm  Lymph nodes: Mildly enlarged mesenteric lymph nodes, nonspecific, possibly reactive  Continued   attention on followup is recommended  IMPRESSION:   1  Interval decrease in size of pancreatic pseudocyst at the region of the pancreatic neck now   measuring approximately 1 6 x 1 0 cm    2  Redemonstration of mild peripancreatic inflammatory changes which may represent residual   and/or recurrent acute pancreatitis  3  Stable mild pancreatic ductal dilatation  4  Decreased narrowing at the portosplenic confluence  Stable collateral vessels  5  Hepatic steatosis  Hepatosplenomegaly  6  Trace ascites  7  Mildly enlarged mesenteric lymph nodes, nonspecific, possibly reactive  Continued attention on   followup is recommended  Final Result      Minimal peripancreatic inflammatory stranding, representing residual/resolving changes from the prior study, versus recurrent acute pancreatitis  Stable small pseudocyst in the pancreatic neck  Hepatomegaly with fatty infiltration  Findings are consistent with the preliminary report from [facility] which was provided shortly after completion of the exam                       Workstation performed: [id number]               Portions of the record may have been created with voice recognition software   Occasional wrong word or "sound a like" substitutions may have occurred due to the inherent limitations of voice recognition software  Read the chart carefully and recognize, using context, where substitutions have occurred

## 2017-10-27 NOTE — ED PROVIDER NOTES
History  Chief Complaint   Patient presents with    Abdominal Pain     Per patient, "I've been having stomach pains since yesterday, I'm pretty sure it's pancreatitis, I had it in May " Denies n/v/d  HPI   79-year-old male with significant history of pancreatitis with pseudocyst (s/p abdominal surgeries) presents with epigastric abdominal pain  Patient states that he began having abdominal pain yesterday that feels similar to prior episodes of pancreatitis  He describes intermittent sharp epigastric pain without radiation  Pain has been associated with nausea without vomiting  On ROS, patient also complains of abdominal pain, which he has had on and off for a few weeks  He has seen his PCP with complaint of diarrhea and has been told to follow up if symptom persists  He denies any complaints other than stated above Patient has not noted any modifying factors for his symptoms  Patient's last episode of pancreatitis was in May and he states that each episode of pancreatitis occurred after binge drinking  He denies any recent alcohol intake or abdominal trauma  He is s/p cholecystectomy  Prior to Admission Medications   Prescriptions Last Dose Informant Patient Reported? Taking?    amLODIPine (NORVASC) 10 mg tablet 10/25/2017 at morning  Yes Yes   Sig: Take 10 mg by mouth daily   metFORMIN (GLUCOPHAGE) 500 mg tablet 10/26/2017 at morning  No Yes   Sig: Take 1 tablet by mouth 2 (two) times a day with meals   predniSONE 10 mg tablet 10/25/2017 at morning  No Yes   Si tab bid for a week, then 1 tab daily      Facility-Administered Medications: None       Past Medical History:   Diagnosis Date    Diabetes mellitus ([address] )     pre-diabetic     Hypertension     Pancreatitis     Portal vein thrombosis        Past Surgical History:   Procedure Laterality Date    ABDOMINAL SURGERY      CHOLECYSTECTOMY      [place] EDG US EXAM SURGICAL ALTER STOM DUODENUM/JEJUNUM N/A 2017    Procedure: LINEAR ENDOSCOPIC U/S WITH Rosa Cortez;  Surgeon: Duncan Mast MD;  [facility]; Service: Gastroenterology    [place] EDG US EXAM SURGICAL ALTER STOM DUODENUM/JEJUNUM N/A 9/14/2017    Procedure: LINEAR ENDOSCOPIC U/S POSSIBLE AXIOS;  Surgeon: Duncan Mast MD;  [facility]; Service: Gastroenterology       History reviewed  No pertinent family history  I have reviewed and agree with the history as documented  Social History   Substance Use Topics    Smoking status: Never Smoker    Smokeless tobacco: Never Used    Alcohol use Yes      Comment: socially -rarely        Review of Systems   Constitutional: Negative for chills and fever  Respiratory: Negative for shortness of breath  Cardiovascular: Negative for chest pain  Gastrointestinal: Positive for abdominal pain, diarrhea and nausea  Negative for vomiting  Musculoskeletal: Negative for back pain  Skin: Negative for rash and wound  Allergic/Immunologic: Negative for immunocompromised state  Neurological: Negative for headaches  Psychiatric/Behavioral: The patient is not nervous/anxious  All other systems reviewed and are negative  Physical Exam  ED Triage Vitals [10/26/17 2300]   Temperature Pulse Respirations Blood Pressure SpO2   97 5 °F (36 4 °C) 82 16 144/88 96 %      Temp Source Heart Rate Source Patient Position - Orthostatic VS BP Location FiO2 (%)   Tympanic Monitor Sitting Left arm --      Pain Score       9           Orthostatic Vital Signs  Vitals:    10/27/17 0145 10/27/17 0245 10/27/17 0315 10/27/17 0415   BP: 132/92 136/96 (!) 150/102 131/92   Pulse: 78 69 66 67   Patient Position - Orthostatic VS: Lying Lying Lying Lying       Physical Exam   Constitutional: He is oriented to person, place, and time  He appears well-nourished  No distress  HENT:   Head: Normocephalic and atraumatic  Eyes: EOM are normal    Neck: Normal range of motion  Neck supple  Cardiovascular: Normal rate and regular rhythm      Pulmonary/Chest: Effort normal and breath sounds normal  No respiratory distress  Abdominal: Soft  He exhibits no distension  There is tenderness in the epigastric area  Musculoskeletal: Normal range of motion  Neurological: He is alert and oriented to person, place, and time  Skin: Skin is warm and dry  He is not diaphoretic  Psychiatric: He has a normal mood and affect  His behavior is normal    Nursing note and vitals reviewed        ED Medications  Medications   sodium chloride 0 9 % bolus 1,000 mL (0 mL Intravenous Stopped 10/27/17 0237)   morphine (PF) 10 mg/mL injection 6 mg (6 mg Intravenous Given 10/27/17 0039)   ketorolac (TORADOL) 30 mg/mL injection 15 mg (15 mg Intravenous Given 10/27/17 0037)   iohexol (OMNIPAQUE) 350 MG/ML injection (MULTI-DOSE) 100 mL (100 mL Intravenous Given 10/27/17 0128)   lidocaine viscous (XYLOCAINE) 2 % mucosal solution 15 mL (15 mL Swish & Swallow Given 10/27/17 0415)   aluminum-magnesium hydroxide-simethicone (MYLANTA) 200-200-20 mg/5 mL oral suspension 30 mL (30 mL Oral Given 10/27/17 0415)       Diagnostic Studies  Results Reviewed     Procedure Component Value Units Date/Time    Urine Microscopic [[id number]]  (Normal) Collected:  10/27/17 0332    Lab Status:  Final result Specimen:  Urine from Urine, Clean Catch Updated:  10/27/17 0339     RBC, UA None Seen /hpf      WBC, UA None Seen /hpf      Epithelial Cells None Seen /hpf      Bacteria, UA None Seen /hpf      Hyaline Casts, UA None Seen /lpf     POCT urinalysis dipstick [[id number]]  (Normal) Resulted:  10/27/17 0329    Lab Status:  Final result Updated:  10/27/17 0329     Color, UA yellow/clear    ED Urine Macroscopic [[id number]]  (Abnormal) Collected:  10/27/17 0332    Lab Status:  Final result Specimen:  Urine Updated:  10/27/17 0327     Color, UA Yellow     Clarity, UA Clear     pH, UA 7 0     Leukocytes, UA Negative     Nitrite, UA Negative     Protein, UA Negative mg/dl      Glucose, UA Negative mg/dl      Ketones, UA Negative mg/dl Urobilinogen, UA 0 2 E U /dl      Bilirubin, UA Negative     Blood, UA Trace (A)     Specific [place], UA 1 010    Narrative:       CLINITEK RESULT    Comprehensive metabolic panel [[id number]]  (Abnormal) Collected:  10/27/17 0000    Lab Status:  Final result Specimen:  Blood from Arm, Right Updated:  10/27/17 0038     Sodium 139 mmol/L      Potassium 3 4 (L) mmol/L      Chloride 104 mmol/L      CO2 26 mmol/L      Anion Gap 9 mmol/L      BUN 11 mg/dL      Creatinine 0 81 mg/dL      Glucose 162 (H) mg/dL      Calcium 8 7 mg/dL      AST 32 U/L      ALT 91 (H) U/L      Alkaline Phosphatase 138 (H) U/L      Total Protein 7 3 g/dL      Albumin 3 8 g/dL      Total Bilirubin 0 37 mg/dL      eGFR 109 ml/min/1 73sq m     Narrative:         National Kidney Disease Education Program recommendations are as follows:  GFR calculation is accurate only with a steady state creatinine  Chronic Kidney disease less than 60 ml/min/1 73 sq  meters  Kidney failure less than 15 ml/min/1 73 sq  meters      Lipase [[id number]]  (Normal) Collected:  10/27/17 0000    Lab Status:  Final result Specimen:  Blood from Arm, Right Updated:  10/27/17 0038     Lipase 225 u/L     CBC and differential [[id number]]  (Abnormal) Collected:  10/27/17 0000    Lab Status:  Final result Specimen:  Blood from Arm, Right Updated:  10/27/17 0008     WBC 4 69 Thousand/uL      RBC 5 01 Million/uL      Hemoglobin 14 0 g/dL      Hematocrit 40 2 %      MCV 80 (L) fL      MCH 27 9 pg      MCHC 34 8 g/dL      RDW 13 6 %      MPV 10 0 fL      Platelets 725 (L) Thousands/uL      nRBC 0 /100 WBCs      Neutrophils Relative 62 %      Lymphocytes Relative 21 %      Monocytes Relative 9 %      Eosinophils Relative 8 (H) %      Basophils Relative 0 %      Neutrophils Absolute 2 90 Thousands/µL      Lymphocytes Absolute 0 97 Thousands/µL      Monocytes Absolute 0 43 Thousand/µL      Eosinophils Absolute 0 35 Thousand/µL      Basophils Absolute 0 02 Thousands/µL                  CT abdomen pelvis w contrast   ED Interpretation by Kathrynn Collet, MD (10/27 0255)   VRAD: FINDINGS:   Lower thorax: Mild bibasilar atelectasis  ABDOMEN:   Liver: Hepatic steatosis  Hepatosplenomegaly  Gallbladder and bile ducts: Status post cholecystectomy  No ductal dilation  Pancreas: Interval decrease in size of pancreatic pseudocyst at the region of the pancreatic neck   now measuring approximately 1 6 x 1 0 cm  Redemonstration of mild peripancreatic inflammatory   changes which may represent residual and/or recurrent acute pancreatitis  Stable mild pancreatic   ductal dilatation  Spleen: See above  Accessory splenule  Adrenals: Unremarkable  No mass  Kidneys and ureters: Unremarkable  No solid mass  No hydronephrosis  Stomach and bowel: Unremarkable  No obstruction  No mucosal thickening  Appendix: Visualized without findings to suggest acute appendicitis  PELVIS:   Bladder: Partially decompressed urinary bladder  Reproductive: Unremarkable as visualized  ABDOMEN and PELVIS:   Intraperitoneal space: Trace ascites  No free air  Lavern Amin Accession: [id number] MRN: [id number]  Preliminary Radiology Report    (QA) DISCREPANCY? If there is a discrepancy between the preliminary and final interpretation, please notify [facility] via [email or web address]  If you do not have access to our QA portal, call our QA team at [phone]   This report is intended only for the use of the referring physician, and only in accordance with law, If you received this in error, call [phone]   Page 2 of 2   Bones/joints: No acute fracture  No dislocation  Soft tissues: Fat containing umbilical hernia  Vasculature: Decreased narrowing at the portosplenic confluence  Stable collateral vessels  No   abdominal aortic aneurysm  Lymph nodes: Mildly enlarged mesenteric lymph nodes, nonspecific, possibly reactive   Continued   attention on followup is recommended  IMPRESSION:   1  Interval decrease in size of pancreatic pseudocyst at the region of the pancreatic neck now   measuring approximately 1 6 x 1 0 cm    2  Redemonstration of mild peripancreatic inflammatory changes which may represent residual   and/or recurrent acute pancreatitis  3  Stable mild pancreatic ductal dilatation  4  Decreased narrowing at the portosplenic confluence  Stable collateral vessels  5  Hepatic steatosis  Hepatosplenomegaly  6  Trace ascites  7  Mildly enlarged mesenteric lymph nodes, nonspecific, possibly reactive  Continued attention on   followup is recommended  Final Result by Yg Wilson MD (10/27 1916)      Minimal peripancreatic inflammatory stranding, representing residual/resolving changes from the prior study, versus recurrent acute pancreatitis  Stable small pseudocyst in the pancreatic neck  Hepatomegaly with fatty infiltration  Findings are consistent with the preliminary report from [facility] which was provided shortly after completion of the exam                       Workstation performed: [id number]               Procedures  Procedures      Phone Consults  ED Phone Contact    ED Course  ED Course as of Oct 30 0113   Fri Oct 27, 2017   0121 At CT                                MDM  Number of Diagnoses or Management Options  Epigastric pain:   Diagnosis management comments: 50-year-old male with epigastric pain, hx severe pancreatitis and significant hospital course  Will obtain belly labs, CTAP  Will provide analgesia and reassess  Dispo pending       CritCare Time    Disposition  Final diagnoses:   Epigastric pain     Time reflects when diagnosis was documented in both MDM as applicable and the Disposition within this note     Time User Action Codes Description Comment    10/27/2017  3:36 AM Jorgito Paiz Add [R10 13] Epigastric pain       ED Disposition     ED Disposition Condition Comment    Discharge  Max Obrien discharge to home/self care  Condition at discharge: Good        Follow-up Information     Follow up With Specialties Details Why Contact Info    Tiffanie Corpora, DO   As needed, If symptoms worsen [address]  [phone]          Discharge Medication List as of 10/27/2017  3:37 AM      START taking these medications    Details   famotidine (PEPCID) 20 mg tablet Take 1 tablet by mouth 2 (two) times a day, Starting Fri 10/27/2017, Print         CONTINUE these medications which have NOT CHANGED    Details   amLODIPine (NORVASC) 10 mg tablet Take 10 mg by mouth daily, Historical Med      metFORMIN (GLUCOPHAGE) 500 mg tablet Take 1 tablet by mouth 2 (two) times a day with meals, Starting Sun 7/30/2017, Normal      predniSONE 10 mg tablet 1 tab bid for a week, then 1 tab daily, Normal           No discharge procedures on file  ED Provider  Attending physically available and evaluated Marguerite Bowling I managed the patient along with the ED Attending      Electronically Signed by         Washington Ray MD  Resident  10/30/17 6353

## 2017-10-27 NOTE — DISCHARGE INSTRUCTIONS
Abdominal Pain   WHAT YOU NEED TO KNOW:   Abdominal pain can be dull, achy, or sharp  You may have pain in one area of your abdomen, or in your entire abdomen  Your pain may be caused by a condition such as constipation, food sensitivity or poisoning, infection, or a blockage  Abdominal pain can also be from a hernia, appendicitis, or an ulcer  Liver, gallbladder, or kidney conditions can also cause abdominal pain  The cause of your abdominal pain may be unknown  DISCHARGE INSTRUCTIONS:   Return to the emergency department if:   · You have new chest pain or shortness of breath  · You have pulsing pain in your upper abdomen or lower back that suddenly becomes constant  · Your pain is in the right lower abdominal area and worsens with movement  · You have a fever over 100 4°F (38°C) or shaking chills  · You are vomiting and cannot keep food or liquids down  · Your pain does not improve or gets worse over the next 8 to 12 hours  · You see blood in your vomit or bowel movements, or they look black and tarry  · Your skin or the whites of your eyes turn yellow  · You are a woman and have a large amount of vaginal bleeding that is not your monthly period  Contact your healthcare provider if:   · You have pain in your lower back  · You are a man and have pain in your testicles  · You have pain when you urinate  · You have questions or concerns about your condition or care  Follow up with your healthcare provider within 24 hours or as directed:  Write down your questions so you remember to ask them during your visits  Medicines:   Epigastric Pain   WHAT YOU NEED TO KNOW:   Epigastric pain is felt in the middle of the upper abdomen, between the ribs and the bellybutton  The pain may be mild or severe  Pain may spread from or to another part of your body  Epigastric pain may be a sign of a serious health problem that needs to be treated     DISCHARGE INSTRUCTIONS:   Call 911 for any of the following:   · You have any of the following signs of a heart attack:      ¨ Squeezing, pressure, or pain in your chest that lasts longer than 5 minutes or returns    ¨ Discomfort or pain in your back, neck, jaw, stomach, or arm     ¨ Trouble breathing    ¨ Nausea or vomiting    ¨ Lightheadedness or a sudden cold sweat, especially with chest pain or trouble breathing    · You have severe pain that radiates to your jaw or back  Return to the emergency department if:   · You have severe pain that starts suddenly and quickly gets worse  · You cannot have a bowel movement and are vomiting  · You vomit or cough up blood  · You see blood in your urine or bowel movement  · You feel drowsy and your breathing is slower than usual   Contact your healthcare provider if:   · You have a fever or chills  · You have yellowing of your skin or the whites of your eyes  · You vomit often or several times in a row  · You lose weight without trying  · You have symptoms for longer than 2 weeks  · You have questions or concerns about your condition or care  Medicines:   · Medicines  may be given to treat pain or stop vomiting  You may also need medicines to reduce or control stomach acid, or treat an infection  · Take your medicine as directed  Contact your healthcare provider if you think your medicine is not helping or if you have side effects  Tell him of her if you are allergic to any medicine  Keep a list of the medicines, vitamins, and herbs you take  Include the amounts, and when and why you take them  Bring the list or the pill bottles to follow-up visits  Carry your medicine list with you in case of an emergency  Follow up with your healthcare provider as directed:  Write down your questions so you remember to ask them during your visits  Manage your symptoms:   · Keep a record of your symptoms  Include when the pain starts, how long it lasts, and if it is sharp or dull   Also include any foods you ate or activities you did before the pain started  Keep track of anything that helped the pain  · Eat a variety of healthy foods  Healthy foods include fruits, vegetables, whole-grain breads, low-fat dairy products, beans, lean meats, and fish  Ask if you need to be on a special diet  Certain foods may cause your pain, such as alcohol or foods that are high in fat  You may need to eat smaller meals and to eat more often than usual     · Drink liquids as directed  Ask how much liquid to drink each day and which liquids are best for you  Do not have drinks that contain alcohol or caffeine  © 2017 [employer] Information is for End User's use only and may not be sold, redistributed or otherwise used for commercial purposes  All illustrations and images included in CareNotes® are the copyrighted property of [employer]  or [employer]  The above information is an  only  It is not intended as medical advice for individual conditions or treatments  Talk to your doctor, nurse or pharmacist before following any medical regimen to see if it is safe and effective for you  · Medicines  may be given to calm your stomach and prevent vomiting or to decrease pain  Ask how to take pain medicine safely  · Take your medicine as directed  Contact your healthcare provider if you think your medicine is not helping or if you have side effects  Tell him of her if you are allergic to any medicine  Keep a list of the medicines, vitamins, and herbs you take  Include the amounts, and when and why you take them  Bring the list or the pill bottles to follow-up visits  Carry your medicine list with you in case of an emergency  © 2017 [employer] Information is for End User's use only and may not be sold, redistributed or otherwise used for commercial purposes   All illustrations and images included in CareNotes® are the copyrighted property of [employer]  or [employer]  The above information is an  only  It is not intended as medical advice for individual conditions or treatments  Talk to your doctor, nurse or pharmacist before following any medical regimen to see if it is safe and effective for you

## 2017-10-28 NOTE — PROGRESS NOTES
Assessment  1  Acute recurrent pancreatitis (577 0) (K85 90)   2  Pseudocyst of pancreas (577 2) (K86 3)   3  Eosinophilic esophagitis (477 74) (K20 0)   4  Liver lesion (573 8) (K76 9)    Plan  Eosinophilic esophagitis    · PredniSONE 10 MG Oral Tablet   Rx By: Richie Fuentes; Dispense: 30 Days ; #:30 Tablet; Refill: 2;For: Eosinophilic esophagitis; SANDI = N; Sent To: [facility]   · PredniSONE 5 MG Oral Tablet; TAKE 1 TABLET Daily for two weeks then STOP   Rx By: Richie Fuentes; Dispense: 14 Days ; #:14 Tablet; Refill: 0;Eosinophilic esophagitis; SANDI = N; Verified Transmission to [facility]; Last Updated By: System, [employer]; 10/11/2017 4:36:25 PM   · Follow-up visit in 4 Months Evaluation and Treatment  Follow-up  Status: Hold For -Scheduling  Requested for: 15UUU2091   Ordered; Eosinophilic esophagitis; Ordered By: Richie Fuentes Performed:  Due: 83PZJ2713    Discussion/Summary  Discussion Summary:   1) Recurrent pancreatitis likely secondary to alcohol complicated by pseudocyst: status post cyst gastrostomy approximately 2 years ago  Recent CT abdomen pelvis from 9/22 shows decreasing size of pancreatic pseudocyst to 15 mm x 10 mm  However, repeat CT abdomen pelvis from 10/2 shows slight interval enlargement of pancreatic pseudocyst to 19 x 12 mm  CT also showed narrowing of the portal vein with possible low-density tissue surrounding the vein suggestive of post inflammatory scar tissue, though cannot rule out neoplasm  He is asymptomatic and denies abdominal pain, nausea, and vomiting  I discussed case with Dr Cyrilla Schilder  Recommend follow-up CT scan in 6 months for surveillance  Probable eosinophilic esophagitis: Stable-patient is asymptomatic  Most recent EGD revealed moderate eosinophilic esophagitis the biopsies show decreased inflammation   We recommend continuing omeprazole 40 mg daily and start tapering prednisone to 5 mg daily x 2 weeks then stopping the prednisone  Liver lesion: CT scan from 7/22 showed 2 2 cm hypodense lesion in the left hepatic lobe  AFP within normal limits  Repeat CT scan shows no evidence of liver lesion  in 4 months  Counseling Documentation With Imm: The patient was counseled regarding diagnostic results,-- instructions for management,-- risk factor reductions,-- prognosis,-- patient and family education,-- impressions,-- risks and benefits of treatment options,-- importance of compliance with treatment  Patient's Capacity to Self-Care: Patient is able to Self-Care  Medication SE Review and Pt Understands Tx: Possible side effects of new medications were reviewed with the patient/guardian today  The treatment plan was reviewed with the patient/guardian  The patient/guardian understands and agrees with the treatment plan      Chief Complaint  Chief Complaint Free Text Note Form: Pt is here for his follow up after CT scan; denies symptoms  Chief Complaint Chronic Condition [facility]: Patient is here today for follow up of chronic conditions described in HPI  History of Present Illness  HPI: 50-year-old male with history of recurrent alcoholic pancreatitis complicated by pseudocyst status post cyst gastrostomy  Patient also has history of esophageal stricture likely secondary to eosinophilic esophagitis  He is being treated with omeprazole 40 mg daily and prednisone 10 mg daily as he was unable to afford fluticasone  he denies dysphagia and odynophagia  Most recent EGD from 9/14/17 showed moderately eosinophilic esophagitis with rings  EUS was attempted but the scope could not be passed through the distal 3rd of the esophagus and ultimately caused a tear  Instead a gastroscope was passed into the stomach which appeared normal except for prior sutures from the cyst gastrostomy   Biopsies from the esophagus showed improvement in number of eosinophils though inflammation was still present so he was recommended to continue prednisone and omeprazole  CT abdomen pelvis from 9/22 showed decreased size of known pancreatic pseudocyst measuring 15 mm x 10 mm and chronic stable pancreatic ductal dilation  was seen in the ED on 10/2 for abdominal pain, vomiting, and diarrhea  Labs are grossly normal including CBC, CMP, lipase, and lactic acid  Interestingly, repeat CT abdomen pelvis showed slight enlargement of pancreatic pseudocyst measuring 19 mm x 12 mm  The portal vein appeared more narrowed with possible low-density tissue surrounding the vein  This could represent scar tissue though cannot rule out neoplasm  symptoms resolved  He denies abdominal pain, nausea, vomiting  He was having regular formed bowel movements last week however had 3 episodes of loose diarrhea this morning  He denies blood in mucus in the stool  No fevers or chills  History Reviewed: The history was obtained today from the patient and I agree with the documented history  Review of Systems  Complete-Male GI Adult:  Constitutional: no fever-- and-- no chills  Eyes: no eyesight problems  ENT: no sore throat  Cardiovascular: no chest pain  Respiratory: no shortness of breath  Gastrointestinal: as noted in HPI  Genitourinary: no dysuria  Musculoskeletal: no arthralgias  Integumentary: no rashes  Neurological: no headache  Psychiatric: no sleep disturbances  Hematologic/Lymphatic: no swollen glands in the neck  ROS Reviewed:   ROS reviewed  Active Problems  1  Abdominal pain (789 00) (R10 9)   2  Acute maxillary sinusitis (461 0) (J01 00)   3  Acute pancreatitis (577 0) (K85 90)   4  Acute recurrent pancreatitis (577 0) (K85 90)   5  Acute upper respiratory infection (465 9) (J06 9)   6  Eosinophilic esophagitis (054 37) (K20 0)   7  Epigastric pain (789 06) (R10 13)   8  Epigastric pain (789 06) (R10 13)   9  Esophageal stricture (530 3) (K22 2)   10  Generalized abdominal pain (789 07) (R10 84)   11  Incisional hernia (553 21) (K43 2)   12   Liver lesion (573 8) (K76 9)   13  Nausea (787 02) (R11 0)   14  Pseudocyst of pancreas (577 2) (K86 3)   15  Viral gastroenteritis (008 8) (A08 4)    Past Medical History  1  History of calculus of gallbladder (V12 79) (Z87 19)  Active Problems And Past Medical History Reviewed: The active problems and past medical history were reviewed and updated today  Surgical History  1  History of Cholecystotomy  Surgical History Reviewed: The surgical history was reviewed and updated today  Family History  Mother    1  No pertinent family history  Family History Reviewed: The family history was reviewed and updated today  Social History     · Always uses seat belt   · Never a smoker   · No alcohol use   · No drug use   · Single  Social History Reviewed: The social history was reviewed and updated today  Current Meds   1  MetFORMIN HCl - 500 MG Oral Tablet; Therapy: 55IAM7861 to Recorded   2  Omeprazole 40 MG Oral Capsule Delayed Release; TAKE 1 CAPSULE Daily take half hour before breakfast; Therapy: 51CBF4975 to (Evaluate:28Kom8390)  Requested for: 05Lnt0312; Last Rx:38Ome0515 Ordered   3  PredniSONE 10 MG Oral Tablet; TAKE 1 TABLET DAILY AS DIRECTED; Therapy: 75Spn0443 to (Evaluate:75Npi6357)  Requested for: 96Oyf3695; Last Rx:90Uuf5956 Ordered  Medication List Reviewed: The medication list was reviewed and updated today  Allergies  1  No Known Drug Allergies    Vitals  Vital Signs    Recorded: 84APF2450 01:03PM   Temperature 97 4 F, Tympanic   Heart Rate 97   Systolic 907, LUE, Sitting   Diastolic 84, LUE, Sitting   Height 5 ft 10 in   Weight 204 lb 2 oz   BMI Calculated 29 29   BSA Calculated 2 11   O2 Saturation 99, RA       Physical Exam   Constitutional  General appearance: No acute distress, well appearing and well nourished  Eyes no scleral icterus  Ears, Nose, Mouth, and Throat moist mucous membranes    Pulmonary  Respiratory effort: No increased work of breathing or signs of respiratory distress  Auscultation of lungs: Clear to auscultation, equal breath sounds bilaterally, no wheezes, no rales, no rhonci  Cardiovascular  Auscultation of heart: Normal rate and rhythm, normal S1 and S2, without murmurs  Examination of extremities for edema and/or varicosities: Normal    Abdomen  Abdomen: Non-tender, no masses  Liver and spleen: No hepatomegaly or splenomegaly  Lymphatic  Palpation of lymph nodes in neck: No lymphadenopathy  Musculoskeletal  Gait and station: Normal    Skin  Skin and subcutaneous tissue: Normal without rashes or lesions  Psychiatric  Orientation to person, place and time: Normal    Mood and affect: Normal          Results/Data  Diagnostic Studies Reviewed:  Lab Review  labs reviewed from ED 10/2  Radiology Review  CT scan reviewed from ED 10/2  * CT ABDOMEN PELVIS W CONTRAST [id number] 10:33AM Jeff Silva Order Number: [id number]   - Patient Instructions: To schedule this appointment, please contact Central Scheduling at [phone]  Test Name Result Flag Reference   CT ABDOMEN PELVIS W CONTRAST (Report)       CT ABDOMEN AND PELVIS WITH IV CONTRAST   INDICATION: follow up pancreatic cyst, cyst drained few years ago and now it is back, hx of gallbladder removal, denies cancer   COMPARISON: CT abdomen pelvis included 7/25/2017  TECHNIQUE: CT examination of the abdomen and pelvis was performed  Reformatted images were created in axial, sagittal, and coronal planes  Radiation dose length product (DLP) for this visit: 1312 56 mGy-cm   This examination, like all CT scans performed in the [facility], was performed utilizing techniques to minimize radiation dose exposure, including the use of   iterative reconstruction and automated exposure control  IV Contrast: 100 mL of iohexol (OMNIPAQUE) 350  Enteric Contrast: Enteric contrast was administered     FINDINGS:   ABDOMEN   LOWER CHEST: No significant abnormalities identified in the lower chest    LIVER/BILIARY TREE: Prominent fatty infiltration  Stable hepatomegaly  GALLBLADDER: Cholecystectomy  SPLEEN: Stable splenomegaly  PANCREAS: Previously seen pancreatic neck pseudocyst is decreased in size and now measures 15 mm x 10 mm  This producing measuring 14 mm x 26 mm  As previously detailed this is tethered to the posterior wall of the gastric antrum and may represent a   prior gastrostomy site  No new pancreatic findings  Chronic pancreatic ductal dilation  ADRENAL GLANDS: Unremarkable  KIDNEYS/URETERS: Unremarkable  No hydronephrosis  STOMACH AND BOWEL: Unremarkable  APPENDIX: No findings to suggest appendicitis  ABDOMINOPELVIC CAVITY: No ascites or free air  Small scattered mesenteric lymph nodes stable from the prior study  VESSELS: Unremarkable for patient's age  PELVIS   REPRODUCTIVE ORGANS:      URINARY BLADDER: Unremarkable  ABDOMINAL WALL/INGUINAL REGIONS: Stable fat-containing paraumbilical hernia  OSSEOUS STRUCTURES: No acute fracture or destructive osseous lesion  IMPRESSION:   Decreased size of known pancreatic neck pseudocyst  No new pancreatic findings      Workstation performed: [id number]   Signed by:  Frannie Degroot MD  9/27/17     U/S Endoscopic, limited to Esophagus 10Uui9047 03:41PM Paz Bourgeois     Test Name Result Flag Reference   U/S Endoscopic, limited to Esophagus (Summary)         Summary / No summary entered :    No summary entered  Documents attached :    Prateek Arreguin: [id number] - [facility] En[facility] - [facility]    Adult) (Result Document)  (1) TISSUE EXAM [id number] 01:46PM Paz Bourgeois     Test Name Result Flag Reference   LAB AP CASE REPORT (Report)       Surgical Pathology Report             Case: [id number]                  Authorizing Provider: Sheryle Congress, MD       Collected:      09/14/2017 1346       Ordering Location:   [address] [place] [address]   Received:      09/15/2017 [facility]oscopy                              Pathologist:      Juan Soriano MD                            Specimens:  A) - Esophagus, Distal eosinophylic esophagitis                           B) - Esophagus, Proximal eosinophylic esophagitis   LAB AP FINAL DIAGNOSIS (Report)       A  Esophagus, distal, biopsy: - Reactive squamous mucosa with mild inflammation and increased  intraepithelial eosinophils (up to 10 per hpf) (see note)  - Negative for atypia or malignancy  B  Esophagus, proximal, biopsy: - Reactive squamous mucosa with mild inflammation and increased  intraepithelial eosinophils (up to 5 per hpf) (see note)  - Negative for atypia or malignancy  Electronically signed by Juan Soriano MD on 9/18/2017 at 1:23 PM   LAB AP NOTE (Report)       The samples show squamous mucosa with scattered increased intraepithelial  eosinophils which are quantitatively insufficient for a diagnosis of  eosinophilic esophagitis in isolation  The differential diagnosis may  include reflux esophagitis, partially treated eosinophilic esophagitis,  allergy, drug effect, or other reactive condition  Correlation with  clinical impression is recommended  LAB AP SURGICAL ADDITIONAL INFORMATION (Report)       All controls performed with the immunohistochemical stains reported above  reacted appropriately  These tests were developed and their performance  characteristics determined by [facility] or  [facility]  They may not be cleared or approved by the [place]  Food and Drug Administration  The FDA has determined that such clearance  or approval is not necessary  These tests are used for clinical purposes  They should not be regarded as investigational or for research  This  laboratory has been approved by CLIA 88, designated as a high-complexity  laboratory and is qualified to perform these tests   Interpretation performed at [facility]  [address] [facility] AP GROSS DESCRIPTION (Report)       A  The specimen is received in formalin, labeled with the patient's name  and hospital number, and is designated distal esophagus  The specimen  consists of 2 white tan-pink soft tissue fragments measuring 0 2 cm and  0 4 cm in greatest dimension  Entirely submitted  One cassette  B  The specimen is received in formalin, labeled with the patient's name  and hospital number, and is designated Proximal esophagus biopsy  The  specimen consists of 2 white tan-pink soft tissue fragments measuring 0 2  cm and 0 5 cm in greatest dimension  Entirely submitted  One cassette  Note: The estimated total formalin fixation time based upon information  provided by the submitting clinician and the standard processing schedule  is under 72 hours  MAS     (1) TISSUE EXAM [id number] 12:21PM Gladies Devoid     Test Name Result Flag Reference   LAB AP CASE REPORT (Report)       Surgical Pathology Report             Case: [id number]                  Authorizing Provider: Gurinder Mariano MD       Collected:      07/24/2017 1221       Ordering Location:   [address] [place] [address]   Received:      07/24/2017 [facility] Endoscopy                              Pathologist:      Leonardo Peter MD                                Specimens:  A) - Stomach, Gastric body bx                                    B) - Esophagus, R/o Eosinophillic esophagitis   LAB AP FINAL DIAGNOSIS (Report)       A  Gastric body (biopsy):   - Gastric oxyntic and foveolar mucosa with no significant pathologic  abnormality    - Helicobacter study pending    - No intestinal metaplasia, dysplasia or neoplasia identified  B  Esophagus (biopsy):   - Cardiac gastric and reactive squamous junctional mucosa with marked  increase in squamous eosinophils (> 50 per HPF)  - No intestinal metaplasia, dysplasia or neoplasia identified    Comment: The findings may represent eosinophilic esophagitis in the appropriate  clinical and endoscopic context  Electronically signed by Cassandra Macias MD on 7/26/2017 at 12:59 PM   LAB AP NOTE        Interpretation performed at [facility], [address] [facility], [facility] [address]  LAB AP SURGICAL ADDITIONAL INFORMATION (Report)       All controls performed with the immunohistochemical stains reported above  reacted appropriately  These tests were developed and their performance  characteristics determined by [facility] or  [email or web address]  They may not be cleared or approved by the [place]  Food and Drug Administration  The FDA has determined that such clearance  or approval is not necessary  These tests are used for clinical purposes  They should not be regarded as investigational or for research  This  laboratory has been approved by [place] [address], designated as a high-complexity  laboratory and is qualified to perform these tests  LAB AP GROSS DESCRIPTION (Report)       A  The specimen is received in formalin, labeled with the patient's name  and hospital number, and is designated gastric body biopsy  The  specimen consists of 3 tan-pink soft tissue fragments measuring 0 2 cm,  0 2 cm, and 0 5 cm in greatest dimension  Entirely submitted  One  cassette  B  The specimen is received in formalin, labeled with the patient's name  and hospital number, and is designated esophagus biopsy  The specimen  consists of multiple white-tan pink soft tissue fragments measuring in  aggregate 0 5 x 0 5 x 0 2 cm  Entirely submitted  One cassette  Note: The estimated total formalin fixation time based upon information  provided by the submitting clinician and the standard processing schedule  is 14 5 hours  MAS   LAB AP CLINICAL INFORMATION Epigastric pain     LAB AP ADDENDUM 1        An immunostain for Helicobacter performed on the gastric body biopsy (part  A, with appropriate control) is negative   Addendum electronically signed by Cassandra Macias MD on 7/27/2017 at 11:44 AM     U/S Endoscopic, limited to Esophagus [id number] 09:00AM [place]      Test Name Result Flag Reference   U/S Endoscopic, limited to Esophagus (Summary)         Summary / No summary entered :    No summary entered  Documents attached :    sProcedures - Gladies Devoid; Christine Okolona: [id number] - Image Encounter - Gladies Devoid -    (Gastroenterology Adult) (Result Document)  (1) AFP, SERUM [id number] 04:25AM Maury Martinez     Test Name Result Flag Reference   AFP - TUMOR MARKER 4 6 ng/mL  0 0 - 8 3     Normal values apply only to males and to nonpregnant females  These results are not interpretable for pregnant females  Roche ECLIA methodology  Values obtained with different assay methods or kits cannot be used interchangeably  Results cannot be interpreted as absolute evidence of the presence or absence of malignant disease  Performed at:  [address] [place] [address]  [id number] : Radha Granger MD, Phone:  [phone]     Attending Note  Collaborating Physician Note: Collaborating Physician: I agree with the Advanced Practitioner note        Future Appointments    Date/Time Provider Specialty Site   11/07/2017 02:00 PM Murta[facility] Gastroenterology Adult [address] [place] [address]       Signatures   Electronically signed by : Siobhan [facility]; Oct 11 2017  2:06PM EST                       (Author)    Electronically signed by : Gurinder Mariano MD; Oct 11 2017  5:04PM EST                       (Author)

## 2018-01-11 NOTE — RESULT NOTES
Discussion/Summary   Biopsy showed improvement in the inflammation however still present  Continue current treatment  Verified Results  (1) TISSUE EXAM [id number] 01:46PM Eliud Fordeel     Test Name Result Flag Reference   LAB AP CASE REPORT (Report)     Surgical Pathology Report             Case: [id number]                   Authorizing Provider: Mitzi Leonardo MD       Collected:      09/14/2017 1346        Ordering Location:   [address] [place] [address]   Received:      09/15/2017 25[facility] Endoscopy                               Pathologist:      Carlin Dewitt MD                             Specimens:  A) - Esophagus, Distal eosinophylic esophagitis                            B) - Esophagus, Proximal eosinophylic esophagitis   LAB AP FINAL DIAGNOSIS (Report)     A  Esophagus, distal, biopsy:  - Reactive squamous mucosa with mild inflammation and increased   intraepithelial eosinophils (up to 10 per hpf) (see note)  - Negative for atypia or malignancy  B  Esophagus, proximal, biopsy:  - Reactive squamous mucosa with mild inflammation and increased   intraepithelial eosinophils (up to 5 per hpf) (see note)  - Negative for atypia or malignancy  Electronically signed by Carlin Dewitt MD on 9/18/2017 at 1:23 PM   LAB AP NOTE (Report)     The samples show squamous mucosa with scattered increased intraepithelial   eosinophils which are quantitatively insufficient for a diagnosis of   eosinophilic esophagitis in isolation  The differential diagnosis may   include reflux esophagitis, partially treated eosinophilic esophagitis,   allergy, drug effect, or other reactive condition  Correlation with   clinical impression is recommended  LAB AP SURGICAL ADDITIONAL INFORMATION (Report)     All controls performed with the immunohistochemical stains reported above   reacted appropriately   These tests were developed and their performance   characteristics determined by [address] [place] [address] Laboratory or   [employer]  They may not be cleared or approved by the [place]  Food and Drug Administration  The FDA has determined that such clearance   or approval is not necessary  These tests are used for clinical purposes  They should not be regarded as investigational or for research  This   laboratory has been approved by CLYDE 88, designated as a high-complexity   laboratory and is qualified to perform these tests  Interpretation performed at , [address]   LAB AP GROSS DESCRIPTION (Report)     A  The specimen is received in formalin, labeled with the patient's name   and hospital number, and is designated distal esophagus  The specimen   consists of 2 white tan-pink soft tissue fragments measuring 0 2 cm and   0 4 cm in greatest dimension  Entirely submitted  One cassette  B  The specimen is received in formalin, labeled with the patient's name   and hospital number, and is designated Proximal esophagus biopsy  The   specimen consists of 2 white tan-pink soft tissue fragments measuring 0 2   cm and 0 5 cm in greatest dimension  Entirely submitted  One cassette  Note: The estimated total formalin fixation time based upon information   provided by the submitting clinician and the standard processing schedule   is under 72 hours   MAS

## 2018-01-12 NOTE — MISCELLANEOUS
Message  Return to work or school:   Avelina Scruggs is under my professional care  He was seen in my office on 9/14/2017   He is able to return to work on  9/19/2017      No Restrictions, will need follow up appointments  Please call our office with any questions [phone]     Marianna Langley MD       Signatures   Electronically signed by : August Galloway, ; Sep 18 2017 11:52AM EST                       (Author)

## 2018-01-13 VITALS
HEART RATE: 97 BPM | OXYGEN SATURATION: 99 % | SYSTOLIC BLOOD PRESSURE: 126 MMHG | TEMPERATURE: 97.4 F | DIASTOLIC BLOOD PRESSURE: 84 MMHG | BODY MASS INDEX: 29.22 KG/M2 | HEIGHT: 70 IN | WEIGHT: 204.13 LBS

## 2018-01-13 NOTE — RESULT NOTES
Verified Results  (1) TISSUE EXAM [id number] 12:21PM Kiersten Cote     Test Name Result Flag Reference   LAB AP CASE REPORT (Report)     Surgical Pathology Report             Case: [id number]                   Authorizing Provider: Rome Stein MD       Collected:      07/24/2017 1221        Ordering Location:   [address] [place] [address]   Received:      07/24/2017 [address] Endoscopy                               Pathologist:      Tabitha Ramsay MD                                 Specimens:  A) - Stomach, Gastric body bx                                     B) - Esophagus, R/o Eosinophillic esophagitis   LAB AP FINAL DIAGNOSIS (Report)     A  Gastric body (biopsy):    - Gastric oxyntic and foveolar mucosa with no significant pathologic   abnormality     - Helicobacter study pending     - No intestinal metaplasia, dysplasia or neoplasia identified  B  Esophagus (biopsy):    - Cardiac gastric and reactive squamous junctional mucosa with marked   increase in squamous eosinophils (> 50 per HPF)  - No intestinal metaplasia, dysplasia or neoplasia identified  Comment:  The findings may represent eosinophilic esophagitis in the appropriate   clinical and endoscopic context  Electronically signed by Tabitha Ramsay MD on 7/26/2017 at 12:59 PM   LAB AP NOTE      Interpretation performed at [facility], [address] [place] [address]  LAB AP SURGICAL ADDITIONAL INFORMATION (Report)     All controls performed with the immunohistochemical stains reported above   reacted appropriately  These tests were developed and their performance   characteristics determined by [facility] or   [facility]  They may not be cleared or approved by the [place]  Food and Drug Administration  The FDA has determined that such clearance   or approval is not necessary  These tests are used for clinical purposes  They should not be regarded as investigational or for research   This laboratory has been approved by CLIA 88, designated as a high-complexity   laboratory and is qualified to perform these tests  LAB AP GROSS DESCRIPTION (Report)     A  The specimen is received in formalin, labeled with the patient's name   and hospital number, and is designated gastric body biopsy  The   specimen consists of 3 tan-pink soft tissue fragments measuring 0 2 cm,   0 2 cm, and 0 5 cm in greatest dimension  Entirely submitted  One   cassette  B  The specimen is received in formalin, labeled with the patient's name   and hospital number, and is designated esophagus biopsy  The specimen   consists of multiple white-tan pink soft tissue fragments measuring in   aggregate 0 5 x 0 5 x 0 2 cm  Entirely submitted  One cassette  Note: The estimated total formalin fixation time based upon information   provided by the submitting clinician and the standard processing schedule   is 14 5 hours     MAS   LAB AP CLINICAL INFORMATION Epigastric pain

## 2018-01-13 NOTE — MISCELLANEOUS
To Whom It May Concern:          Arin Sharp is under my care, procedure has been postponed to 7/24/17  He will not be able to work full duty from 7/7/2017 until 7/31/17   If you  have any questions please feel free to contact the office at [phone]               Marionette Cipro,   [facility]          Electronically signed by:[facility]   Jul 7 2017 10:24AM EST

## 2018-01-14 VITALS
TEMPERATURE: 97.9 F | HEART RATE: 89 BPM | HEIGHT: 70 IN | BODY MASS INDEX: 28.81 KG/M2 | WEIGHT: 201.25 LBS | DIASTOLIC BLOOD PRESSURE: 66 MMHG | OXYGEN SATURATION: 97 % | SYSTOLIC BLOOD PRESSURE: 129 MMHG

## 2018-01-14 VITALS
HEIGHT: 70 IN | DIASTOLIC BLOOD PRESSURE: 80 MMHG | SYSTOLIC BLOOD PRESSURE: 110 MMHG | BODY MASS INDEX: 30.21 KG/M2 | HEART RATE: 74 BPM | OXYGEN SATURATION: 98 % | TEMPERATURE: 98 F | WEIGHT: 211 LBS

## 2018-01-14 NOTE — RESULT NOTES
Verified Results  (1) TISSUE EXAM [id number] 12:21PM Sonia Early     Test Name Result Flag Reference   LAB AP CASE REPORT (Report)     Surgical Pathology Report             Case: [id number]                   Authorizing Provider: Devan Sanz MD       Collected:      07/24/2017 1221        Ordering Location:   [address] [place] [address]   Received:      07/24/2017 [address] [place] [address] Endoscopy                               Pathologist:      Morgan Whitaker MD                                 Specimens:  A) - Stomach, Gastric body bx                                     B) - Esophagus, R/o Eosinophillic esophagitis   LAB AP FINAL DIAGNOSIS (Report)     A  Gastric body (biopsy):    - Gastric oxyntic and foveolar mucosa with no significant pathologic   abnormality     - Helicobacter study pending     - No intestinal metaplasia, dysplasia or neoplasia identified  B  Esophagus (biopsy):    - Cardiac gastric and reactive squamous junctional mucosa with marked   increase in squamous eosinophils (> 50 per HPF)  - No intestinal metaplasia, dysplasia or neoplasia identified  Comment:  The findings may represent eosinophilic esophagitis in the appropriate   clinical and endoscopic context  Electronically signed by Morgan Whitaker MD on 7/26/2017 at 12:59 PM   LAB AP NOTE      Interpretation performed at [facility], [address] [place] [address], [address] [place] [address]  LAB AP SURGICAL ADDITIONAL INFORMATION (Report)     All controls performed with the immunohistochemical stains reported above   reacted appropriately  These tests were developed and their performance   characteristics determined by [facility] or   [employer]  They may not be cleared or approved by the [place]  Food and Drug Administration  The FDA has determined that such clearance   or approval is not necessary  These tests are used for clinical purposes  They should not be regarded as investigational or for research   This laboratory has been approved by CLIA 88, designated as a high-complexity   laboratory and is qualified to perform these tests  LAB AP GROSS DESCRIPTION (Report)     A  The specimen is received in formalin, labeled with the patient's name   and hospital number, and is designated gastric body biopsy  The   specimen consists of 3 tan-pink soft tissue fragments measuring 0 2 cm,   0 2 cm, and 0 5 cm in greatest dimension  Entirely submitted  One   cassette  B  The specimen is received in formalin, labeled with the patient's name   and hospital number, and is designated esophagus biopsy  The specimen   consists of multiple white-tan pink soft tissue fragments measuring in   aggregate 0 5 x 0 5 x 0 2 cm  Entirely submitted  One cassette  Note: The estimated total formalin fixation time based upon information   provided by the submitting clinician and the standard processing schedule   is 14 5 hours  MAS   LAB AP CLINICAL INFORMATION Epigastric pain     LAB AP ADDENDUM 1      An immunostain for Helicobacter performed on the gastric body biopsy (part   A, with appropriate control) is negative    Addendum electronically signed by Montse Rivera MD on 7/27/2017 at 11:44 AM

## 2018-01-18 NOTE — MISCELLANEOUS
Message  Return to work or school:  9/14/2017   He is able to return to work on  9/18/2017    He can perform work without limitations  He may return to work in full capacity but will need availability for appointments  Please call us with any questions          Signatures   Electronically signed by : Joelle Leiva MD; Sep 15 2017  5:16PM EST                       (Author)

## 2018-01-18 NOTE — RESULT NOTES
Discussion/Summary   the CT looks better and the cyst is smaller  no need for eus at this time  he can be at work without restrictions  thanks  Verified Results  * CT ABDOMEN PELVIS W CONTRAST [id number] 10:33AM Gela Paul Order Number: [id number]    - Patient Instructions: To schedule this appointment, please contact Central Scheduling at [phone]  Test Name Result Flag Reference   CT ABDOMEN PELVIS W CONTRAST (Report)     CT ABDOMEN AND PELVIS WITH IV CONTRAST     INDICATION: follow up pancreatic cyst, cyst drained few years ago and now it is back, hx of gallbladder removal, denies cancer     COMPARISON: CT abdomen pelvis included 7/25/2017  TECHNIQUE: CT examination of the abdomen and pelvis was performed  Reformatted images were created in axial, sagittal, and coronal planes  Radiation dose length product (DLP) for this visit: 1312 56 mGy-cm   This examination, like all CT scans performed in the [facility], was performed utilizing techniques to minimize radiation dose exposure, including the use of    iterative reconstruction and automated exposure control  IV Contrast: 100 mL of iohexol (OMNIPAQUE) 350   Enteric Contrast: Enteric contrast was administered  FINDINGS:     ABDOMEN     LOWER CHEST: No significant abnormalities identified in the lower chest      LIVER/BILIARY TREE: Prominent fatty infiltration  Stable hepatomegaly  GALLBLADDER: Cholecystectomy  SPLEEN: Stable splenomegaly  PANCREAS: Previously seen pancreatic neck pseudocyst is decreased in size and now measures 15 mm x 10 mm  This producing measuring 14 mm x 26 mm  As previously detailed this is tethered to the posterior wall of the gastric antrum and may represent a    prior gastrostomy site  No new pancreatic findings  Chronic pancreatic ductal dilation  ADRENAL GLANDS: Unremarkable  KIDNEYS/URETERS: Unremarkable  No hydronephrosis       STOMACH AND BOWEL: Unremarkable  APPENDIX: No findings to suggest appendicitis  ABDOMINOPELVIC CAVITY: No ascites or free air  Small scattered mesenteric lymph nodes stable from the prior study  VESSELS: Unremarkable for patient's age  PELVIS     REPRODUCTIVE ORGANS:        URINARY BLADDER: Unremarkable  ABDOMINAL WALL/INGUINAL REGIONS: Stable fat-containing paraumbilical hernia  OSSEOUS STRUCTURES: No acute fracture or destructive osseous lesion  IMPRESSION:     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

## 2018-03-30 ENCOUNTER — TELEPHONE (OUTPATIENT)
Dept: GASTROENTEROLOGY | Facility: MEDICAL CENTER | Age: 43
End: 2018-03-30

## 2018-05-13 ENCOUNTER — HOSPITAL ENCOUNTER (EMERGENCY)
Facility: HOSPITAL | Age: 43
Discharge: HOME | End: 2018-05-13
Attending: EMERGENCY MEDICINE | Admitting: EMERGENCY MEDICINE
Payer: COMMERCIAL

## 2018-05-13 VITALS
BODY MASS INDEX: 29.35 KG/M2 | HEART RATE: 82 BPM | TEMPERATURE: 97.9 F | RESPIRATION RATE: 18 BRPM | OXYGEN SATURATION: 98 % | SYSTOLIC BLOOD PRESSURE: 161 MMHG | WEIGHT: 205 LBS | DIASTOLIC BLOOD PRESSURE: 95 MMHG | HEIGHT: 70 IN

## 2018-05-13 DIAGNOSIS — Z87.19 HISTORY OF PANCREATITIS: ICD-10-CM

## 2018-05-13 DIAGNOSIS — R10.13 EPIGASTRIC PAIN: Primary | ICD-10-CM

## 2018-05-13 LAB
ALBUMIN SERPL BCP-MCNC: 4 G/DL (ref 3.5–5)
ALP SERPL-CCNC: 113 U/L (ref 46–116)
ALT SERPL W P-5'-P-CCNC: 52 U/L (ref 12–78)
ANION GAP SERPL CALCULATED.3IONS-SCNC: 7 MMOL/L (ref 4–13)
AST SERPL W P-5'-P-CCNC: 21 U/L (ref 5–45)
BASOPHILS # BLD AUTO: 0.02 THOUSANDS/ΜL (ref 0–0.1)
BASOPHILS NFR BLD AUTO: 0 % (ref 0–1)
BILIRUB SERPL-MCNC: 0.24 MG/DL (ref 0.2–1)
BUN SERPL-MCNC: 11 MG/DL (ref 5–25)
CALCIUM SERPL-MCNC: 9.3 MG/DL (ref 8.3–10.1)
CHLORIDE SERPL-SCNC: 107 MMOL/L (ref 100–108)
CO2 SERPL-SCNC: 25 MMOL/L (ref 21–32)
CREAT SERPL-MCNC: 1.03 MG/DL (ref 0.6–1.3)
EOSINOPHIL # BLD AUTO: 0.4 THOUSAND/ΜL (ref 0–0.61)
EOSINOPHIL NFR BLD AUTO: 7 % (ref 0–6)
ERYTHROCYTE [DISTWIDTH] IN BLOOD BY AUTOMATED COUNT: 12.6 % (ref 11.6–15.1)
GFR SERPL CREATININE-BSD FRML MDRD: 88 ML/MIN/1.73SQ M
GLUCOSE SERPL-MCNC: 184 MG/DL (ref 65–140)
HCT VFR BLD AUTO: 43.9 % (ref 36.5–49.3)
HGB BLD-MCNC: 15.1 G/DL (ref 12–17)
LIPASE SERPL-CCNC: 313 U/L (ref 73–393)
LYMPHOCYTES # BLD AUTO: 1.04 THOUSANDS/ΜL (ref 0.6–4.47)
LYMPHOCYTES NFR BLD AUTO: 19 % (ref 14–44)
MCH RBC QN AUTO: 27.2 PG (ref 26.8–34.3)
MCHC RBC AUTO-ENTMCNC: 34.4 G/DL (ref 31.4–37.4)
MCV RBC AUTO: 79 FL (ref 82–98)
MONOCYTES # BLD AUTO: 0.38 THOUSAND/ΜL (ref 0.17–1.22)
MONOCYTES NFR BLD AUTO: 7 % (ref 4–12)
NEUTROPHILS # BLD AUTO: 3.66 THOUSANDS/ΜL (ref 1.85–7.62)
NEUTS SEG NFR BLD AUTO: 67 % (ref 43–75)
NRBC BLD AUTO-RTO: 0 /100 WBCS
PLATELET # BLD AUTO: 187 THOUSANDS/UL (ref 149–390)
PMV BLD AUTO: 11.3 FL (ref 8.9–12.7)
POTASSIUM SERPL-SCNC: 3.5 MMOL/L (ref 3.5–5.3)
PROT SERPL-MCNC: 7.7 G/DL (ref 6.4–8.2)
RBC # BLD AUTO: 5.55 MILLION/UL (ref 3.88–5.62)
SODIUM SERPL-SCNC: 139 MMOL/L (ref 136–145)
WBC # BLD AUTO: 5.52 THOUSAND/UL (ref 4.31–10.16)

## 2018-05-13 PROCEDURE — 80053 COMPREHEN METABOLIC PANEL: CPT | Performed by: EMERGENCY MEDICINE

## 2018-05-13 PROCEDURE — 85025 COMPLETE CBC W/AUTO DIFF WBC: CPT | Performed by: EMERGENCY MEDICINE

## 2018-05-13 PROCEDURE — 36415 COLL VENOUS BLD VENIPUNCTURE: CPT

## 2018-05-13 PROCEDURE — 99284 EMERGENCY DEPT VISIT MOD MDM: CPT

## 2018-05-13 PROCEDURE — 83690 ASSAY OF LIPASE: CPT | Performed by: EMERGENCY MEDICINE

## 2018-05-13 RX ORDER — METOCLOPRAMIDE HYDROCHLORIDE 5 MG/5ML
10 SOLUTION ORAL ONCE
Status: COMPLETED | OUTPATIENT
Start: 2018-05-13 | End: 2018-05-13

## 2018-05-13 RX ORDER — SUCRALFATE ORAL 1 G/10ML
1000 SUSPENSION ORAL ONCE
Status: COMPLETED | OUTPATIENT
Start: 2018-05-13 | End: 2018-05-13

## 2018-05-13 RX ORDER — MAGNESIUM HYDROXIDE/ALUMINUM HYDROXICE/SIMETHICONE 120; 1200; 1200 MG/30ML; MG/30ML; MG/30ML
30 SUSPENSION ORAL EVERY 4 HOURS PRN
Status: DISCONTINUED | OUTPATIENT
Start: 2018-05-13 | End: 2018-05-13

## 2018-05-13 RX ORDER — LISINOPRIL 10 MG/1
10 TABLET ORAL DAILY
COMMUNITY
End: 2019-12-18

## 2018-05-13 RX ORDER — MAGNESIUM HYDROXIDE/ALUMINUM HYDROXICE/SIMETHICONE 120; 1200; 1200 MG/30ML; MG/30ML; MG/30ML
30 SUSPENSION ORAL ONCE
Status: COMPLETED | OUTPATIENT
Start: 2018-05-13 | End: 2018-05-13

## 2018-05-13 RX ADMIN — ALUMINUM HYDROXIDE, MAGNESIUM HYDROXIDE, AND SIMETHICONE 30 ML: 200; 200; 20 SUSPENSION ORAL at 17:38

## 2018-05-13 RX ADMIN — SUCRALFATE 1000 MG: 1 SUSPENSION ORAL at 17:38

## 2018-05-13 RX ADMIN — LIDOCAINE HYDROCHLORIDE 15 ML: 20 SOLUTION ORAL; TOPICAL at 17:38

## 2018-05-13 RX ADMIN — METOCLOPRAMIDE HYDROCHLORIDE 10 MG: 5 SOLUTION ORAL at 17:59

## 2018-05-13 NOTE — DISCHARGE INSTRUCTIONS
Epigastric Pain   WHAT YOU NEED TO KNOW:   Epigastric pain is felt in the middle of the upper abdomen, between the ribs and the bellybutton  The pain may be mild or severe  Pain may spread from or to another part of your body  Epigastric pain may be a sign of a serious health problem that needs to be treated  DISCHARGE INSTRUCTIONS:   Call 911 for any of the following:   · You have any of the following signs of a heart attack:      ¨ Squeezing, pressure, or pain in your chest that lasts longer than 5 minutes or returns    ¨ Discomfort or pain in your back, neck, jaw, stomach, or arm     ¨ Trouble breathing    ¨ Nausea or vomiting    ¨ Lightheadedness or a sudden cold sweat, especially with chest pain or trouble breathing    · You have severe pain that radiates to your jaw or back  Return to the emergency department if:   · You have severe pain that starts suddenly and quickly gets worse  · You cannot have a bowel movement and are vomiting  · You vomit or cough up blood  · You see blood in your urine or bowel movement  · You feel drowsy and your breathing is slower than usual   Contact your healthcare provider if:   · You have a fever or chills  · You have yellowing of your skin or the whites of your eyes  · You vomit often or several times in a row  · You lose weight without trying  · You have symptoms for longer than 2 weeks  · You have questions or concerns about your condition or care  Medicines:   · Medicines  may be given to treat pain or stop vomiting  You may also need medicines to reduce or control stomach acid, or treat an infection  · Take your medicine as directed  Contact your healthcare provider if you think your medicine is not helping or if you have side effects  Tell him of her if you are allergic to any medicine  Keep a list of the medicines, vitamins, and herbs you take  Include the amounts, and when and why you take them   Bring the list or the pill bottles to follow-up visits  Carry your medicine list with you in case of an emergency  Follow up with your healthcare provider as directed:  Write down your questions so you remember to ask them during your visits  Manage your symptoms:   · Keep a record of your symptoms  Include when the pain starts, how long it lasts, and if it is sharp or dull  Also include any foods you ate or activities you did before the pain started  Keep track of anything that helped the pain  · Eat a variety of healthy foods  Healthy foods include fruits, vegetables, whole-grain breads, low-fat dairy products, beans, lean meats, and fish  Ask if you need to be on a special diet  Certain foods may cause your pain, such as alcohol or foods that are high in fat  You may need to eat smaller meals and to eat more often than usual     · Drink liquids as directed  Ask how much liquid to drink each day and which liquids are best for you  Do not have drinks that contain alcohol or caffeine  © 2017 [facility] Information is for End User's use only and may not be sold, redistributed or otherwise used for commercial purposes  All illustrations and images included in CareNotes® are the copyrighted property of [employer]  or [employer]  The above information is an  only  It is not intended as medical advice for individual conditions or treatments  Talk to your doctor, nurse or pharmacist before following any medical regimen to see if it is safe and effective for you

## 2018-05-13 NOTE — ED PROVIDER NOTES
History  Chief Complaint   Patient presents with    Abdominal Pain     Pt states that his stomach has been hurting since Thursday  Pain is worse after he swallows  Pt describes it as a "squeezing pain  Has hx of pancreatitis  40-year-old male with past medical history of alcoholic pancreatitis with resulting pseudocyst status post surgical drainage, pre-diabetes, and hypertension who is presenting with epigastric pain  Patient reports that this pain began on Thursday  He states that it is intermittent, occurring for approximately 20 minutes before resolving  The pain is located in the epigastrium and does not radiate  Patient describes the pain as squeezing in character  He has not taken any medications for this pain  Patient states this pain is worse when lifting heavy objects as well as after he eats  Patient states the pain does not have any particular timing  In terms of associated symptoms, patient reports vomiting on Friday but has not had vomiting since  Vomit was nonbilious, nonbloody  Patient also reports diarrhea yesterday but again has not had any episodes today  Diarrhea was nonbloody  Otherwise, patient takes lisinopril for hypertension but does not take any medications  Denies taking over-the-counter medications  Patient states he has not drank alcohol for approximately 1 year  Patient denies any illicit drug use  Plan:  CBC, CMP, and lipase were obtained by triage  These were unremarkable  On physical examination, patient had a relatively benign abdomen  There is tenderness to palpation in the epigastrium without guarding or peritonitis  Vital signs were within normal limits  Therefore, we will treat the patient with Reglan for nausea and a GI cocktail and reassess  Most likely diagnosis is either gastritis or peptic ulcer disease  Prior to Admission Medications   Prescriptions Last Dose Informant Patient Reported?  Taking?   lisinopril (ZESTRIL) 10 mg tablet 5/13/2018 at Unknown time  Yes Yes   Sig: Take 10 mg by mouth daily      Facility-Administered Medications: None       Past Medical History:   Diagnosis Date    Diabetes mellitus ([address] )     pre-diabetic     Hypertension     Pancreatitis     Portal vein thrombosis        Past Surgical History:   Procedure Laterality Date    ABDOMINAL SURGERY      CHOLECYSTECTOMY      NV EDG US EXAM SURGICAL ALTER STOM DUODENUM/JEJUNUM N/A 7/24/2017    Procedure: LINEAR ENDOSCOPIC U/S WITH AXIOS STENT;  Surgeon: Orlando Rizzo MD;  Location: [facility]; Service: Gastroenterology    NV EDG US EXAM SURGICAL ALTER STOM DUODENUM/JEJUNUM N/A 9/14/2017    Procedure: LINEAR ENDOSCOPIC U/S POSSIBLE AXIOS;  Surgeon: Orlando Rizzo MD;  Location: [facility]; Service: Gastroenterology       History reviewed  No pertinent family history  I have reviewed and agree with the history as documented  Social History   Substance Use Topics    Smoking status: Never Smoker    Smokeless tobacco: Never Used    Alcohol use Yes      Comment: socially -rarely        Review of Systems   Constitutional: Negative for diaphoresis, fever and unexpected weight change  HENT: Negative for congestion, rhinorrhea and sore throat  Eyes: Negative for pain, discharge and visual disturbance  Respiratory: Negative for cough, shortness of breath and wheezing  Cardiovascular: Negative for chest pain, palpitations and leg swelling  Gastrointestinal: Positive for abdominal pain, diarrhea, nausea and vomiting  Negative for blood in stool and constipation  Genitourinary: Negative for dysuria, flank pain and hematuria  Musculoskeletal: Negative for arthralgias and myalgias  Skin: Negative for rash and wound  Allergic/Immunologic: Negative for environmental allergies and food allergies  Neurological: Negative for dizziness, seizures, weakness and numbness  Hematological: Negative for adenopathy     Psychiatric/Behavioral: Negative for confusion and hallucinations  Physical Exam  ED Triage Vitals [05/13/18 1553]   Temperature Pulse Respirations Blood Pressure SpO2   97 9 °F (36 6 °C) 83 20 (!) 169/108 97 %      Temp Source Heart Rate Source Patient Position - Orthostatic VS BP Location FiO2 (%)   Oral Monitor Lying Right arm --      Pain Score       8           Orthostatic Vital Signs  Vitals:    05/13/18 1553 05/13/18 1717   BP: (!) 169/108 161/95   Pulse: 83 82   Patient Position - Orthostatic VS: Lying        Physical Exam   Constitutional: He is oriented to person, place, and time  He appears well-developed and well-nourished  HENT:   Head: Normocephalic and atraumatic  Right Ear: External ear normal    Left Ear: External ear normal    Nose: Nose normal    Eyes: EOM are normal  Pupils are equal, round, and reactive to light  Neck: Normal range of motion  Neck supple  Cardiovascular: Normal rate, regular rhythm and normal heart sounds  No murmur heard  Pulmonary/Chest: Effort normal and breath sounds normal  No respiratory distress  He has no wheezes  He has no rales  Abdominal: Soft  Bowel sounds are normal  He exhibits no distension  There is tenderness  There is no guarding  Surgical scars noted on abdomen  Active bowel sounds  Mild tenderness to palpation in the epigastrium without guarding or peritoneal signs  Musculoskeletal: Normal range of motion  He exhibits no deformity  Neurological: He is alert and oriented to person, place, and time  No gross motor deficits noted  Cranial nerves II-XII are intact  Speech is fluent without dysarthria or aphasia  Skin: Skin is warm and dry  Capillary refill takes less than 2 seconds  He is not diaphoretic  Psychiatric: He has a normal mood and affect  His behavior is normal  Judgment and thought content normal    Nursing note and vitals reviewed        ED Medications  Medications   sucralfate (CARAFATE) oral suspension 1,000 mg (1,000 mg Oral Given 5/13/18 1738)   metoclopramide [facility]) oral solution 10 mg (10 mg Oral Given 5/13/18 1759)   aluminum-magnesium hydroxide-simethicone (MYLANTA) 200-200-20 mg/5 mL oral suspension 30 mL (30 mL Oral Given 5/13/18 1738)   lidocaine viscous (XYLOCAINE) 2 % mucosal solution 15 mL (15 mL Swish & Swallow Given 5/13/18 1738)       Diagnostic Studies  Results Reviewed     Procedure Component Value Units Date/Time    Comprehensive metabolic panel [[id number]]  (Abnormal) Collected:  05/13/18 1604    Lab Status:  Final result Specimen:  Blood from Hand, Left Updated:  05/13/18 1649     Sodium 139 mmol/L      Potassium 3 5 mmol/L      Chloride 107 mmol/L      CO2 25 mmol/L      Anion Gap 7 mmol/L      BUN 11 mg/dL      Creatinine 1 03 mg/dL      Glucose 184 (H) mg/dL      Calcium 9 3 mg/dL      AST 21 U/L      ALT 52 U/L      Alkaline Phosphatase 113 U/L      Total Protein 7 7 g/dL      Albumin 4 0 g/dL      Total Bilirubin 0 24 mg/dL      eGFR 88 ml/min/1 73sq m     Narrative:         National Kidney Disease Education Program recommendations are as follows:  GFR calculation is accurate only with a steady state creatinine  Chronic Kidney disease less than 60 ml/min/1 73 sq  meters  Kidney failure less than 15 ml/min/1 73 sq  meters      Lipase [[id number]]  (Normal) Collected:  05/13/18 1604    Lab Status:  Final result Specimen:  Blood from Hand, Left Updated:  05/13/18 1649     Lipase 313 u/L     CBC and differential [[id number]]  (Abnormal) Collected:  05/13/18 1604    Lab Status:  Final result Specimen:  Blood from Hand, Left Updated:  05/13/18 1637     WBC 5 52 Thousand/uL      RBC 5 55 Million/uL      Hemoglobin 15 1 g/dL      Hematocrit 43 9 %      MCV 79 (L) fL      MCH 27 2 pg      MCHC 34 4 g/dL      RDW 12 6 %      MPV 11 3 fL      Platelets 297 Thousands/uL      nRBC 0 /100 WBCs      Neutrophils Relative 67 %      Lymphocytes Relative 19 %      Monocytes Relative 7 %      Eosinophils Relative 7 (H) %      Basophils Relative 0 %      Neutrophils Absolute 3 66 Thousands/µL      Lymphocytes Absolute 1 04 Thousands/µL      Monocytes Absolute 0 38 Thousand/µL      Eosinophils Absolute 0 40 Thousand/µL      Basophils Absolute 0 02 Thousands/µL                  No orders to display         Procedures  Procedures      Phone Consults  ED Phone Contact    ED Course  ED Course as of May 13 2213   Sun May 13, 2018   1659 Blood Pressure: (!) 169/108   1659 Temperature: 97 9 °F (36 6 °C)   1659 Pulse: 83   1659 Respirations: 20   1659 SpO2: 97 %   1659 CBC, CMP, and lipase ordered from triage  Other than slight hyperglycemia, labs are within normal limits  6769 Patient reassessed  He reports only minimal improvement with GI cocktail  He feels well enough to go home  Will provide GI follow up  MDM  Number of Diagnoses or Management Options  Epigastric pain: new and requires workup  History of pancreatitis: established and improving  Diagnosis management comments:     48-year-old male with past medical history of alcoholic pancreatitis with resulting pseudocyst status post surgical treatment who is presenting with epigastric pain  Vital signs were within normal limits  Physical examination was benign with only mild tenderness in the epigastrium without guarding or peritoneal signs  Labs were within normal limits  Based upon patient's clinical presentation, physical examination, and results of diagnostic testing, most likely diagnosis would be either gastritis or peptic ulcer disease  I informed the patient of this  We treated the patient with a GI cocktail with minimal improvement in symptoms  Patient felt well enough to go home  I did advise that he could take Zantac or Prilosec over-the-counter for his symptoms  I provided GI follow-up  I also instructed the patient to follow up with his PCP  Finally, I did provide return precautions         Amount and/or Complexity of Data Reviewed  Clinical lab tests: ordered and reviewed  Decide to obtain previous medical records or to obtain history from someone other than the patient: yes  Review and summarize past medical records: yes    Risk of Complications, Morbidity, and/or Mortality  Presenting problems: low  Diagnostic procedures: minimal  Management options: minimal    Patient Progress  Patient progress: improved    CritCare Time    Disposition  Final diagnoses:   Epigastric pain   History of pancreatitis     Time reflects when diagnosis was documented in both MDM as applicable and the Disposition within this note     Time User Action Codes Description Comment    5/13/2018  6:11 PM Logan Chaidez Add [R10 13] Epigastric pain     5/13/2018  6:11 PM Logan Chaidez Add [Z87 19] History of pancreatitis       ED Disposition     ED Disposition Condition Comment    Discharge  Gwyn Hartman discharge to home/self care  Condition at discharge: Good        Follow-up Information     Follow up With Specialties Details Why [address] [facility] [address] [facility] Call in 1 day For follow up on epigastric pain  [address] [facility]  [phone]        Discharge Medication List as of 5/13/2018  6:12 PM      CONTINUE these medications which have NOT CHANGED    Details   lisinopril (ZESTRIL) 10 mg tablet Take 10 mg by mouth daily, Historical Med           No discharge procedures on file  ED Provider  Attending physically available and evaluated Gwyn Hartman  I managed the patient along with the ED Attending      Electronically Signed by         Holland Del Toro MD  05/13/18 [phone]

## 2018-05-13 NOTE — ED ATTENDING ATTESTATION
Janice Blum MD, saw and evaluated the patient  All available labs and X-rays were ordered by me or the resident and have been reviewed by myself  I discussed the patient with the resident / non-physician and agree with the resident's / non-physician practitioner's findings and plan as documented in the resident's / non-physician practicitioner's note, except where noted  At this point, I agree with the current assessment done in the ED  Chief Complaint   Patient presents with    Abdominal Pain     Pt states that his stomach has been hurting since Thursday  Pain is worse after he swallows  Pt describes it as a "squeezing pain  Has hx of pancreatitis  This is a 39year old male with upper abdominal pain since Thursday that comes and goes, gets intense for 20 minutes, then goes away  Epigastric without radiation  Denies f/ch   +vomiting on Friday, but none since then  Feels like pancreatitis  Loose stools yesterday, no diarrhea today  No blood in diarrhea or vomit  Vomit is NBNB  No fevers at home  No cp/sob  Denies any urinary tract infection symptoms (burning, itching, pain, blood, frequency)  Denies any upper respiratory tract infection symptoms (cough, congestion, rhinorrhea, sore throat)  He takes prilosec  PMH:  - eosinophilic esophagitis  - alcoholic pancreatitis  - portal vein thrombosis  - htn  - dm  psh:  - daksha  - EGD  No smoking  Former alcohol  No drugs  PE:  Vitals:    05/13/18 1553 05/13/18 1717   BP: (!) 169/108 161/95   BP Location: Right arm    Pulse: 83 82   Resp: 20 18   Temp: 97 9 °F (36 6 °C)    TempSrc: Oral    SpO2: 97% 98%   Weight: 93 kg (205 lb)    Height: 5' 10" (1 778 m)    General: VSS, NAD, awake, alert  Well-nourished, well-developed  Appears stated age  Head: Normocephalic, atraumatic, nontender  Eyes: PERRL, EOM-I  No diplopia  No hyphema  No subconjunctival hemorrhages  Symmetrical lids  ENT: Atraumatic external nose and ears      MMM  No malocclusion  No stridor  Normal phonation  No drooling  Normal swallowing  Neck: Symmetric, trachea midline  No JVD  CV: RRR  +S1/S2  No murmurs or gallops  Peripheral pulses +2 throughout  No chest wall tenderness  Lungs:   Unlabored No retractions  CTAB, lungs sounds equal bilateral    No crepitus  No tachypnea  No paradoxical motion  Abd: +BS, soft  Epigastric tenderness  Nothing else where in the abdomen  ND  No guarding  No rigidity  No rebound  No hepatosplenomegaly noted  No peritoneal signs  Psoas/obturator/heel strike signs are absent  MSK:   FROM   No lower extremity edema  Back:   No CVAT  Skin: Dry, intact  Neuro: AAOx3, GCS 15, CN II-XII grossly intact  Motor grossly intact  Psychiatric/Behavioral: Appropriate mood and affect  normal   Exam: deferred  A:  - Belly pain  P:  - unclear etiology but re-assuring exam  - supportive measures  - if tolerates PO intake + feels better --> DC  - else, imaging  If return of symptoms, discussed RTER precautions   - 13 point ROS was performed and all are normal unless stated in the history above  - Nursing note reviewed  Vitals reviewed  - Orders placed by myself and/or advanced practitioner / resident     - Previous chart was reviewed  - No language barrier    - History obtained from patient  - There are no limitations to the history obtained  - Critical care time: Not applicable for this patient  Final Diagnosis:  1  Epigastric pain    2   History of pancreatitis           Medications   sucralfate (CARAFATE) oral suspension 1,000 mg (1,000 mg Oral Given 5/13/18 1738)   metoclopramide (REGLAN) oral solution 10 mg (10 mg Oral Given 5/13/18 1759)   aluminum-magnesium hydroxide-simethicone (MYLANTA) 200-200-20 mg/5 mL oral suspension 30 mL (30 mL Oral Given 5/13/18 1738)   lidocaine viscous (XYLOCAINE) 2 % mucosal solution 15 mL (15 mL Swish & Swallow Given 5/13/18 1738)     No orders to display     Orders Placed This Encounter Procedures    CBC and differential    Comprehensive metabolic panel    Lipase     Labs Reviewed   CBC AND DIFFERENTIAL - Abnormal        Result Value Ref Range Status    WBC 5 52  4 31 - 10 16 Thousand/uL Final    RBC 5 55  3 88 - 5 62 Million/uL Final    Hemoglobin 15 1  12 0 - 17 0 g/dL Final    Hematocrit 43 9  36 5 - 49 3 % Final    MCV 79 (*) 82 - 98 fL Final    MCH 27 2  26 8 - 34 3 pg Final    MCHC 34 4  31 4 - 37 4 g/dL Final    RDW 12 6  11 6 - 15 1 % Final    MPV 11 3  8 9 - 12 7 fL Final    Platelets 081  829 - 390 Thousands/uL Final    nRBC 0  /100 WBCs Final    Neutrophils Relative 67  43 - 75 % Final    Lymphocytes Relative 19  14 - 44 % Final    Monocytes Relative 7  4 - 12 % Final    Eosinophils Relative 7 (*) 0 - 6 % Final    Basophils Relative 0  0 - 1 % Final    Neutrophils Absolute 3 66  1 85 - 7 62 Thousands/µL Final    Lymphocytes Absolute 1 04  0 60 - 4 47 Thousands/µL Final    Monocytes Absolute 0 38  0 17 - 1 22 Thousand/µL Final    Eosinophils Absolute 0 40  0 00 - 0 61 Thousand/µL Final    Basophils Absolute 0 02  0 00 - 0 10 Thousands/µL Final   COMPREHENSIVE METABOLIC PANEL - Abnormal     Sodium 139  136 - 145 mmol/L Final    Potassium 3 5  3 5 - 5 3 mmol/L Final    Comment: Slightly Hemolyzed; Results May be Affected    Chloride 107  100 - 108 mmol/L Final    CO2 25  21 - 32 mmol/L Final    Anion Gap 7  4 - 13 mmol/L Final    BUN 11  5 - 25 mg/dL Final    Creatinine 1 03  0 60 - 1 30 mg/dL Final    Comment: Standardized to IDMS reference method    Glucose 184 (*) 65 - 140 mg/dL Final    Comment:   If the patient is fasting, the ADA then defines impaired fasting glucose as > 100 mg/dL and diabetes as > or equal to 123 mg/dL  Specimen collection should occur prior to Sulfasalazine administration due to the potential for falsely depressed results  Specimen collection should occur prior to Sulfapyridine administration due to the potential for falsely elevated results      Calcium 9 3 8 3 - 10 1 mg/dL Final    AST 21  5 - 45 U/L Final    Comment: Slightly Hemolyzed; Results May be Affected  Specimen collection should occur prior to Sulfasalazine administration due to the potential for falsely depressed results  ALT 52  12 - 78 U/L Final    Comment:   Specimen collection should occur prior to Sulfasalazine and/or Sulfapyridine administration due to the potential for falsely depressed results  Alkaline Phosphatase 113  46 - 116 U/L Final    Total Protein 7 7  6 4 - 8 2 g/dL Final    Albumin 4 0  3 5 - 5 0 g/dL Final    Total Bilirubin 0 24  0 20 - 1 00 mg/dL Final    eGFR 88  ml/min/1 73sq m Final    Narrative:     National Kidney Disease Education Program recommendations are as follows:  GFR calculation is accurate only with a steady state creatinine  Chronic Kidney disease less than 60 ml/min/1 73 sq  meters  Kidney failure less than 15 ml/min/1 73 sq  meters  LIPASE - Normal    Lipase 313  73 - 393 u/L Final     Time reflects when diagnosis was documented in both MDM as applicable and the Disposition within this note     Time User Action Codes Description Comment    5/13/2018  6:11 PM Philomena Hamlin [R10 13] Epigastric pain     5/13/2018  6:11 PM Byron Vargas [Z87 19] History of pancreatitis       ED Disposition     ED Disposition Condition Comment    Discharge  Beatrisedelmira Walker discharge to home/self care  Condition at discharge: Good        Follow-up Information     Follow up With Specialties Details Why [address] [facility] [address] [facility] Call in 1 day For follow up on epigastric pain  [address] [facility]  [phone]        Discharge Medication List as of 5/13/2018  6:12 PM      CONTINUE these medications which have NOT CHANGED    Details   lisinopril (ZESTRIL) 10 mg tablet Take 10 mg by mouth daily, Historical Med           No discharge procedures on file    Prior to Admission Medications Prescriptions Last Dose Informant Patient Reported? Taking?   lisinopril (ZESTRIL) 10 mg tablet 5/13/2018 at Unknown time  Yes Yes   Sig: Take 10 mg by mouth daily      Facility-Administered Medications: None       Portions of the record may have been created with voice recognition software  Occasional wrong word or "sound a like" substitutions may have occurred due to the inherent limitations of voice recognition software  Read the chart carefully and recognize, using context, where substitutions have occurred      Electronically signed by:  Van Coker

## 2019-12-18 ENCOUNTER — HOSPITAL ENCOUNTER (INPATIENT)
Facility: HOSPITAL | Age: 44
LOS: 4 days | Discharge: HOME | DRG: 247 | End: 2019-12-22
Attending: EMERGENCY MEDICINE | Admitting: INTERNAL MEDICINE
Payer: COMMERCIAL

## 2019-12-18 ENCOUNTER — APPOINTMENT (INPATIENT)
Dept: NON INVASIVE DIAGNOSTICS | Facility: HOSPITAL | Age: 44
DRG: 247 | End: 2019-12-18
Payer: COMMERCIAL

## 2019-12-18 ENCOUNTER — APPOINTMENT (EMERGENCY)
Dept: RADIOLOGY | Facility: HOSPITAL | Age: 44
DRG: 247 | End: 2019-12-18
Payer: COMMERCIAL

## 2019-12-18 DIAGNOSIS — E78.5 DYSLIPIDEMIA: ICD-10-CM

## 2019-12-18 DIAGNOSIS — K86.0 ALCOHOL-INDUCED CHRONIC PANCREATITIS (HCC): ICD-10-CM

## 2019-12-18 DIAGNOSIS — E11.9 TYPE 2 DIABETES MELLITUS WITHOUT COMPLICATION, WITHOUT LONG-TERM CURRENT USE OF INSULIN (HCC): ICD-10-CM

## 2019-12-18 DIAGNOSIS — R07.9 CHEST PAIN: Primary | ICD-10-CM

## 2019-12-18 DIAGNOSIS — I21.4 NSTEMI (NON-ST ELEVATED MYOCARDIAL INFARCTION) (HCC): ICD-10-CM

## 2019-12-18 DIAGNOSIS — R77.8 ELEVATED TROPONIN: ICD-10-CM

## 2019-12-18 PROBLEM — E86.0 DEHYDRATION: Status: RESOLVED | Noted: 2017-07-03 | Resolved: 2019-12-18

## 2019-12-18 PROBLEM — I10 ESSENTIAL HYPERTENSION: Status: ACTIVE | Noted: 2017-06-02

## 2019-12-18 PROBLEM — I81 PORTAL VEIN THROMBOSIS: Status: ACTIVE | Noted: 2019-12-18

## 2019-12-18 PROBLEM — R10.9 ABDOMINAL PAIN: Status: RESOLVED | Noted: 2017-05-13 | Resolved: 2019-12-18

## 2019-12-18 PROBLEM — E78.1 HYPERTRIGLYCERIDEMIA: Status: ACTIVE | Noted: 2019-12-18

## 2019-12-18 PROBLEM — K21.9 GASTROESOPHAGEAL REFLUX DISEASE: Status: ACTIVE | Noted: 2017-06-02

## 2019-12-18 PROBLEM — K21.00 GASTROESOPHAGEAL REFLUX DISEASE WITH ESOPHAGITIS: Status: ACTIVE | Noted: 2017-06-02

## 2019-12-18 LAB
ALBUMIN SERPL BCP-MCNC: 4.4 G/DL (ref 3.5–5)
ALP SERPL-CCNC: 169 U/L (ref 46–116)
ALT SERPL W P-5'-P-CCNC: 41 U/L (ref 12–78)
ANION GAP SERPL CALCULATED.3IONS-SCNC: 7 MMOL/L (ref 4–13)
APTT PPP: 28 SECONDS (ref 23–37)
APTT PPP: 48 SECONDS (ref 23–37)
AST SERPL W P-5'-P-CCNC: 27 U/L (ref 5–45)
ATRIAL RATE: 66 BPM
ATRIAL RATE: 74 BPM
ATRIAL RATE: 80 BPM
BASOPHILS # BLD AUTO: 0.04 THOUSANDS/ΜL (ref 0–0.1)
BASOPHILS NFR BLD AUTO: 1 % (ref 0–1)
BILIRUB SERPL-MCNC: 0.43 MG/DL (ref 0.2–1)
BUN SERPL-MCNC: 10 MG/DL (ref 5–25)
CALCIUM SERPL-MCNC: 9.5 MG/DL (ref 8.3–10.1)
CHLORIDE SERPL-SCNC: 98 MMOL/L (ref 100–108)
CHOLEST SERPL-MCNC: 331 MG/DL (ref 50–200)
CO2 SERPL-SCNC: 28 MMOL/L (ref 21–32)
CREAT SERPL-MCNC: 1.24 MG/DL (ref 0.6–1.3)
EOSINOPHIL # BLD AUTO: 0.2 THOUSAND/ΜL (ref 0–0.61)
EOSINOPHIL NFR BLD AUTO: 3 % (ref 0–6)
ERYTHROCYTE [DISTWIDTH] IN BLOOD BY AUTOMATED COUNT: 12.3 % (ref 11.6–15.1)
EST. AVERAGE GLUCOSE BLD GHB EST-MCNC: 335 MG/DL
GFR SERPL CREATININE-BSD FRML MDRD: 70 ML/MIN/1.73SQ M
GLUCOSE SERPL-MCNC: 175 MG/DL (ref 65–140)
GLUCOSE SERPL-MCNC: 222 MG/DL (ref 65–140)
GLUCOSE SERPL-MCNC: 263 MG/DL (ref 65–140)
GLUCOSE SERPL-MCNC: 309 MG/DL (ref 65–140)
GLUCOSE SERPL-MCNC: 372 MG/DL (ref 65–140)
HBA1C MFR BLD: 13.3 % (ref 4.2–6.3)
HCT VFR BLD AUTO: 47.8 % (ref 36.5–49.3)
HDLC SERPL-MCNC: 37 MG/DL
HGB BLD-MCNC: 15.9 G/DL (ref 12–17)
IMM GRANULOCYTES # BLD AUTO: 0.03 THOUSAND/UL (ref 0–0.2)
IMM GRANULOCYTES NFR BLD AUTO: 0 % (ref 0–2)
INR PPP: 0.96 (ref 0.84–1.19)
KCT BLD-ACNC: 310 SEC (ref 89–137)
LDLC SERPL DIRECT ASSAY-MCNC: 128 MG/DL (ref 0–100)
LYMPHOCYTES # BLD AUTO: 0.86 THOUSANDS/ΜL (ref 0.6–4.47)
LYMPHOCYTES NFR BLD AUTO: 12 % (ref 14–44)
MCH RBC QN AUTO: 26.1 PG (ref 26.8–34.3)
MCHC RBC AUTO-ENTMCNC: 33.3 G/DL (ref 31.4–37.4)
MCV RBC AUTO: 78 FL (ref 82–98)
MONOCYTES # BLD AUTO: 0.51 THOUSAND/ΜL (ref 0.17–1.22)
MONOCYTES NFR BLD AUTO: 7 % (ref 4–12)
NEUTROPHILS # BLD AUTO: 5.38 THOUSANDS/ΜL (ref 1.85–7.62)
NEUTS SEG NFR BLD AUTO: 77 % (ref 43–75)
NRBC BLD AUTO-RTO: 0 /100 WBCS
P AXIS: -11 DEGREES
P AXIS: 13 DEGREES
P AXIS: 62 DEGREES
PLATELET # BLD AUTO: 211 THOUSANDS/UL (ref 149–390)
PMV BLD AUTO: 10.8 FL (ref 8.9–12.7)
POTASSIUM SERPL-SCNC: 3.2 MMOL/L (ref 3.5–5.3)
PR INTERVAL: 176 MS
PR INTERVAL: 184 MS
PR INTERVAL: 188 MS
PROT SERPL-MCNC: 8.1 G/DL (ref 6.4–8.2)
PROTHROMBIN TIME: 12.4 SECONDS (ref 11.6–14.5)
QRS AXIS: 2 DEGREES
QRS AXIS: 24 DEGREES
QRS AXIS: 7 DEGREES
QRSD INTERVAL: 86 MS
QRSD INTERVAL: 88 MS
QRSD INTERVAL: 90 MS
QT INTERVAL: 350 MS
QT INTERVAL: 416 MS
QT INTERVAL: 450 MS
QTC INTERVAL: 403 MS
QTC INTERVAL: 461 MS
QTC INTERVAL: 471 MS
RBC # BLD AUTO: 6.1 MILLION/UL (ref 3.88–5.62)
SODIUM SERPL-SCNC: 133 MMOL/L (ref 136–145)
SPECIMEN SOURCE: ABNORMAL
T WAVE AXIS: -32 DEGREES
T WAVE AXIS: -46 DEGREES
T WAVE AXIS: 14 DEGREES
TRIGL SERPL-MCNC: 841 MG/DL
TROPONIN I SERPL-MCNC: 0.79 NG/ML
TROPONIN I SERPL-MCNC: 2.84 NG/ML
TROPONIN I SERPL-MCNC: 6.45 NG/ML
VENTRICULAR RATE: 66 BPM
VENTRICULAR RATE: 74 BPM
VENTRICULAR RATE: 80 BPM
WBC # BLD AUTO: 7.02 THOUSAND/UL (ref 4.31–10.16)

## 2019-12-18 PROCEDURE — C1769 GUIDE WIRE: HCPCS | Performed by: INTERNAL MEDICINE

## 2019-12-18 PROCEDURE — C1725 CATH, TRANSLUMIN NON-LASER: HCPCS | Performed by: INTERNAL MEDICINE

## 2019-12-18 PROCEDURE — 85730 THROMBOPLASTIN TIME PARTIAL: CPT | Performed by: EMERGENCY MEDICINE

## 2019-12-18 PROCEDURE — 80061 LIPID PANEL: CPT | Performed by: INTERNAL MEDICINE

## 2019-12-18 PROCEDURE — 82948 REAGENT STRIP/BLOOD GLUCOSE: CPT

## 2019-12-18 PROCEDURE — 85610 PROTHROMBIN TIME: CPT | Performed by: EMERGENCY MEDICINE

## 2019-12-18 PROCEDURE — 85347 COAGULATION TIME ACTIVATED: CPT

## 2019-12-18 PROCEDURE — 90471 IMMUNIZATION ADMIN: CPT | Performed by: INTERNAL MEDICINE

## 2019-12-18 PROCEDURE — C1894 INTRO/SHEATH, NON-LASER: HCPCS | Performed by: INTERNAL MEDICINE

## 2019-12-18 PROCEDURE — B2111ZZ FLUOROSCOPY OF MULTIPLE CORONARY ARTERIES USING LOW OSMOLAR CONTRAST: ICD-10-PCS | Performed by: INTERNAL MEDICINE

## 2019-12-18 PROCEDURE — 92928 PRQ TCAT PLMT NTRAC ST 1 LES: CPT | Performed by: INTERNAL MEDICINE

## 2019-12-18 PROCEDURE — C1887 CATHETER, GUIDING: HCPCS | Performed by: INTERNAL MEDICINE

## 2019-12-18 PROCEDURE — 71046 X-RAY EXAM CHEST 2 VIEWS: CPT

## 2019-12-18 PROCEDURE — 83036 HEMOGLOBIN GLYCOSYLATED A1C: CPT | Performed by: INTERNAL MEDICINE

## 2019-12-18 PROCEDURE — 83721 ASSAY OF BLOOD LIPOPROTEIN: CPT | Performed by: INTERNAL MEDICINE

## 2019-12-18 PROCEDURE — 027034Z DILATION OF CORONARY ARTERY, ONE ARTERY WITH DRUG-ELUTING INTRALUMINAL DEVICE, PERCUTANEOUS APPROACH: ICD-10-PCS | Performed by: INTERNAL MEDICINE

## 2019-12-18 PROCEDURE — 85730 THROMBOPLASTIN TIME PARTIAL: CPT | Performed by: INTERNAL MEDICINE

## 2019-12-18 PROCEDURE — 85025 COMPLETE CBC W/AUTO DIFF WBC: CPT | Performed by: EMERGENCY MEDICINE

## 2019-12-18 PROCEDURE — 93005 ELECTROCARDIOGRAM TRACING: CPT

## 2019-12-18 PROCEDURE — 99223 1ST HOSP IP/OBS HIGH 75: CPT | Performed by: INTERNAL MEDICINE

## 2019-12-18 PROCEDURE — 99152 MOD SED SAME PHYS/QHP 5/>YRS: CPT | Performed by: INTERNAL MEDICINE

## 2019-12-18 PROCEDURE — 84484 ASSAY OF TROPONIN QUANT: CPT | Performed by: EMERGENCY MEDICINE

## 2019-12-18 PROCEDURE — 90686 IIV4 VACC NO PRSV 0.5 ML IM: CPT | Performed by: INTERNAL MEDICINE

## 2019-12-18 PROCEDURE — 99153 MOD SED SAME PHYS/QHP EA: CPT | Performed by: INTERNAL MEDICINE

## 2019-12-18 PROCEDURE — 93306 TTE W/DOPPLER COMPLETE: CPT | Performed by: INTERNAL MEDICINE

## 2019-12-18 PROCEDURE — 93454 CORONARY ARTERY ANGIO S&I: CPT | Performed by: INTERNAL MEDICINE

## 2019-12-18 PROCEDURE — 93306 TTE W/DOPPLER COMPLETE: CPT

## 2019-12-18 PROCEDURE — 93010 ELECTROCARDIOGRAM REPORT: CPT | Performed by: INTERNAL MEDICINE

## 2019-12-18 PROCEDURE — 36415 COLL VENOUS BLD VENIPUNCTURE: CPT | Performed by: EMERGENCY MEDICINE

## 2019-12-18 PROCEDURE — C9600 PERC DRUG-EL COR STENT SING: HCPCS | Performed by: INTERNAL MEDICINE

## 2019-12-18 PROCEDURE — 99232 SBSQ HOSP IP/OBS MODERATE 35: CPT | Performed by: NURSE PRACTITIONER

## 2019-12-18 PROCEDURE — 84484 ASSAY OF TROPONIN QUANT: CPT | Performed by: INTERNAL MEDICINE

## 2019-12-18 PROCEDURE — 99285 EMERGENCY DEPT VISIT HI MDM: CPT

## 2019-12-18 PROCEDURE — 99253 IP/OBS CNSLTJ NEW/EST LOW 45: CPT | Performed by: INTERNAL MEDICINE

## 2019-12-18 PROCEDURE — 80053 COMPREHEN METABOLIC PANEL: CPT | Performed by: EMERGENCY MEDICINE

## 2019-12-18 PROCEDURE — 99285 EMERGENCY DEPT VISIT HI MDM: CPT | Performed by: EMERGENCY MEDICINE

## 2019-12-18 PROCEDURE — C1874 STENT, COATED/COV W/DEL SYS: HCPCS

## 2019-12-18 RX ORDER — FENTANYL CITRATE 50 UG/ML
INJECTION, SOLUTION INTRAMUSCULAR; INTRAVENOUS CODE/TRAUMA/SEDATION MEDICATION
Status: COMPLETED | OUTPATIENT
Start: 2019-12-18 | End: 2019-12-18

## 2019-12-18 RX ORDER — NITROGLYCERIN 0.4 MG/1
0.4 TABLET SUBLINGUAL ONCE
Status: COMPLETED | OUTPATIENT
Start: 2019-12-18 | End: 2019-12-18

## 2019-12-18 RX ORDER — SODIUM CHLORIDE 9 MG/ML
100 INJECTION, SOLUTION INTRAVENOUS CONTINUOUS
Status: DISCONTINUED | OUTPATIENT
Start: 2019-12-18 | End: 2019-12-19

## 2019-12-18 RX ORDER — ATORVASTATIN CALCIUM 40 MG/1
40 TABLET, FILM COATED ORAL
Status: DISCONTINUED | OUTPATIENT
Start: 2019-12-18 | End: 2019-12-18

## 2019-12-18 RX ORDER — ACETAMINOPHEN 325 MG/1
650 TABLET ORAL EVERY 6 HOURS PRN
Status: DISCONTINUED | OUTPATIENT
Start: 2019-12-18 | End: 2019-12-22 | Stop reason: HOSPADM

## 2019-12-18 RX ORDER — NITROGLYCERIN 20 MG/100ML
INJECTION INTRAVENOUS CODE/TRAUMA/SEDATION MEDICATION
Status: COMPLETED | OUTPATIENT
Start: 2019-12-18 | End: 2019-12-18

## 2019-12-18 RX ORDER — NITROGLYCERIN 0.4 MG/1
0.4 TABLET SUBLINGUAL
Status: DISCONTINUED | OUTPATIENT
Start: 2019-12-18 | End: 2019-12-22 | Stop reason: HOSPADM

## 2019-12-18 RX ORDER — ATORVASTATIN CALCIUM 40 MG/1
40 TABLET, FILM COATED ORAL
Status: DISCONTINUED | OUTPATIENT
Start: 2019-12-18 | End: 2019-12-19

## 2019-12-18 RX ORDER — ONDANSETRON 2 MG/ML
4 INJECTION INTRAMUSCULAR; INTRAVENOUS EVERY 6 HOURS PRN
Status: DISCONTINUED | OUTPATIENT
Start: 2019-12-18 | End: 2019-12-22 | Stop reason: HOSPADM

## 2019-12-18 RX ORDER — CHLORAL HYDRATE 500 MG
1000 CAPSULE ORAL DAILY
COMMUNITY

## 2019-12-18 RX ORDER — ASPIRIN 81 MG/1
81 TABLET, CHEWABLE ORAL DAILY
Status: DISCONTINUED | OUTPATIENT
Start: 2019-12-18 | End: 2019-12-22 | Stop reason: HOSPADM

## 2019-12-18 RX ORDER — MULTIVIT-MIN/IRON FUM/FOLIC AC 7.5 MG-4
1 TABLET ORAL DAILY
COMMUNITY

## 2019-12-18 RX ORDER — HEPARIN SODIUM 1000 [USP'U]/ML
2000 INJECTION, SOLUTION INTRAVENOUS; SUBCUTANEOUS AS NEEDED
Status: DISCONTINUED | OUTPATIENT
Start: 2019-12-18 | End: 2019-12-18

## 2019-12-18 RX ORDER — FAMOTIDINE 20 MG/1
20 TABLET, FILM COATED ORAL 2 TIMES DAILY PRN
Status: DISCONTINUED | OUTPATIENT
Start: 2019-12-18 | End: 2019-12-22 | Stop reason: HOSPADM

## 2019-12-18 RX ORDER — VERAPAMIL HYDROCHLORIDE 2.5 MG/ML
INJECTION, SOLUTION INTRAVENOUS CODE/TRAUMA/SEDATION MEDICATION
Status: COMPLETED | OUTPATIENT
Start: 2019-12-18 | End: 2019-12-18

## 2019-12-18 RX ORDER — HEPARIN SODIUM 1000 [USP'U]/ML
4000 INJECTION, SOLUTION INTRAVENOUS; SUBCUTANEOUS ONCE
Status: COMPLETED | OUTPATIENT
Start: 2019-12-18 | End: 2019-12-18

## 2019-12-18 RX ORDER — HEPARIN SODIUM 1000 [USP'U]/ML
4000 INJECTION, SOLUTION INTRAVENOUS; SUBCUTANEOUS AS NEEDED
Status: DISCONTINUED | OUTPATIENT
Start: 2019-12-18 | End: 2019-12-18

## 2019-12-18 RX ORDER — INSULIN GLARGINE 100 [IU]/ML
15 INJECTION, SOLUTION SUBCUTANEOUS
Status: DISCONTINUED | OUTPATIENT
Start: 2019-12-18 | End: 2019-12-21

## 2019-12-18 RX ORDER — MIDAZOLAM HYDROCHLORIDE 2 MG/2ML
INJECTION, SOLUTION INTRAMUSCULAR; INTRAVENOUS CODE/TRAUMA/SEDATION MEDICATION
Status: COMPLETED | OUTPATIENT
Start: 2019-12-18 | End: 2019-12-18

## 2019-12-18 RX ORDER — HEPARIN SODIUM 1000 [USP'U]/ML
INJECTION, SOLUTION INTRAVENOUS; SUBCUTANEOUS CODE/TRAUMA/SEDATION MEDICATION
Status: COMPLETED | OUTPATIENT
Start: 2019-12-18 | End: 2019-12-18

## 2019-12-18 RX ORDER — NITROGLYCERIN 20 MG/100ML
5-200 INJECTION INTRAVENOUS
Status: DISCONTINUED | OUTPATIENT
Start: 2019-12-18 | End: 2019-12-18

## 2019-12-18 RX ORDER — HEPARIN SODIUM 10000 [USP'U]/100ML
3-20 INJECTION, SOLUTION INTRAVENOUS
Status: DISCONTINUED | OUTPATIENT
Start: 2019-12-18 | End: 2019-12-18

## 2019-12-18 RX ORDER — POTASSIUM CHLORIDE 20 MEQ/1
40 TABLET, EXTENDED RELEASE ORAL
Status: COMPLETED | OUTPATIENT
Start: 2019-12-18 | End: 2019-12-18

## 2019-12-18 RX ORDER — LISINOPRIL 10 MG/1
10 TABLET ORAL DAILY
Status: DISCONTINUED | OUTPATIENT
Start: 2019-12-19 | End: 2019-12-19

## 2019-12-18 RX ORDER — ATORVASTATIN CALCIUM 40 MG/1
40 TABLET, FILM COATED ORAL EVERY EVENING
Status: DISCONTINUED | OUTPATIENT
Start: 2019-12-18 | End: 2019-12-18

## 2019-12-18 RX ORDER — ASPIRIN 81 MG/1
81 TABLET, CHEWABLE ORAL DAILY
Status: DISCONTINUED | OUTPATIENT
Start: 2019-12-19 | End: 2019-12-18

## 2019-12-18 RX ADMIN — INSULIN LISPRO 10 UNITS: 100 INJECTION, SOLUTION INTRAVENOUS; SUBCUTANEOUS at 05:42

## 2019-12-18 RX ADMIN — HEPARIN SODIUM 4000 UNITS: 1000 INJECTION INTRAVENOUS; SUBCUTANEOUS at 17:59

## 2019-12-18 RX ADMIN — INSULIN LISPRO 5 UNITS: 100 INJECTION, SOLUTION INTRAVENOUS; SUBCUTANEOUS at 07:30

## 2019-12-18 RX ADMIN — SODIUM CHLORIDE 100 ML/HR: 0.9 INJECTION, SOLUTION INTRAVENOUS at 09:16

## 2019-12-18 RX ADMIN — INSULIN GLARGINE 15 UNITS: 100 INJECTION, SOLUTION SUBCUTANEOUS at 22:07

## 2019-12-18 RX ADMIN — IOHEXOL 110 ML: 350 INJECTION, SOLUTION INTRAVENOUS at 18:30

## 2019-12-18 RX ADMIN — FENTANYL CITRATE 50 MCG: 50 INJECTION, SOLUTION INTRAMUSCULAR; INTRAVENOUS at 18:18

## 2019-12-18 RX ADMIN — POTASSIUM CHLORIDE 40 MEQ: 1500 TABLET, EXTENDED RELEASE ORAL at 05:21

## 2019-12-18 RX ADMIN — HEPARIN SODIUM 4000 UNITS: 1000 INJECTION INTRAVENOUS; SUBCUTANEOUS at 04:26

## 2019-12-18 RX ADMIN — VERAPAMIL HYDROCHLORIDE 2.5 MG: 2.5 INJECTION INTRAVENOUS at 17:59

## 2019-12-18 RX ADMIN — ACETAMINOPHEN 650 MG: 325 TABLET ORAL at 05:32

## 2019-12-18 RX ADMIN — MIDAZOLAM 1 MG: 1 INJECTION INTRAMUSCULAR; INTRAVENOUS at 18:18

## 2019-12-18 RX ADMIN — ATORVASTATIN CALCIUM 40 MG: 40 TABLET, FILM COATED ORAL at 06:49

## 2019-12-18 RX ADMIN — HEPARIN SODIUM AND DEXTROSE 11.8 UNITS/KG/HR: 10000; 5 INJECTION INTRAVENOUS at 04:25

## 2019-12-18 RX ADMIN — NITROGLYCERIN 5 MCG/MIN: 20 INJECTION INTRAVENOUS at 07:31

## 2019-12-18 RX ADMIN — TICAGRELOR 90 MG: 90 TABLET ORAL at 21:29

## 2019-12-18 RX ADMIN — METOPROLOL TARTRATE 25 MG: 25 TABLET ORAL at 05:21

## 2019-12-18 RX ADMIN — FENTANYL CITRATE 50 MCG: 50 INJECTION, SOLUTION INTRAMUSCULAR; INTRAVENOUS at 17:55

## 2019-12-18 RX ADMIN — INSULIN LISPRO 4 UNITS: 100 INJECTION, SOLUTION INTRAVENOUS; SUBCUTANEOUS at 22:08

## 2019-12-18 RX ADMIN — TICAGRELOR 180 MG: 90 TABLET ORAL at 08:43

## 2019-12-18 RX ADMIN — INSULIN LISPRO 2 UNITS: 100 INJECTION, SOLUTION INTRAVENOUS; SUBCUTANEOUS at 07:29

## 2019-12-18 RX ADMIN — SODIUM CHLORIDE 1000 ML: 0.9 INJECTION, SOLUTION INTRAVENOUS at 04:25

## 2019-12-18 RX ADMIN — METOPROLOL TARTRATE 25 MG: 25 TABLET ORAL at 21:29

## 2019-12-18 RX ADMIN — INFLUENZA VIRUS VACCINE 0.5 ML: 15; 15; 15; 15 SUSPENSION INTRAMUSCULAR at 08:24

## 2019-12-18 RX ADMIN — NITROGLYCERIN 0.4 MG: 0.4 TABLET SUBLINGUAL at 05:27

## 2019-12-18 RX ADMIN — POTASSIUM CHLORIDE 40 MEQ: 1500 TABLET, EXTENDED RELEASE ORAL at 07:29

## 2019-12-18 RX ADMIN — NITROGLYCERIN 0.4 MG: 0.4 TABLET SUBLINGUAL at 05:41

## 2019-12-18 RX ADMIN — ASPIRIN 81 MG 81 MG: 81 TABLET ORAL at 08:23

## 2019-12-18 RX ADMIN — MIDAZOLAM 2 MG: 1 INJECTION INTRAMUSCULAR; INTRAVENOUS at 17:55

## 2019-12-18 RX ADMIN — HEPARIN SODIUM 6000 UNITS: 1000 INJECTION INTRAVENOUS; SUBCUTANEOUS at 18:05

## 2019-12-18 RX ADMIN — NITROGLYCERIN 200 MCG: 20 INJECTION INTRAVENOUS at 17:59

## 2019-12-18 RX ADMIN — HEPARIN SODIUM 2000 UNITS: 1000 INJECTION INTRAVENOUS; SUBCUTANEOUS at 10:52

## 2019-12-18 NOTE — CONSULTS
Consultation - Cardiology   Devan Lopez 38 y o  male MRN: [id number]  Unit/Bed#: CW2 218-01 Encounter: [id number]      Assessment:  Principal Problem:    NSTEMI (non-ST elevated myocardial infarction) [facility])  Active Problems:    Alcohol-induced chronic pancreatitis ([address] )    Essential hypertension    Hypokalemia    Gastroesophageal reflux disease with esophagitis    Type 2 diabetes mellitus without complication, without long-term current use of insulin ([facility])    Assessment and plan    NSTEMI, possibly type 1    - Continued substernal chest pain, EKG this morning with T-wave inversions V4 to V6, troponin 0 79--> 2 84    - would schedule him for a cardiac catheterization today  Discussed with him the importance of medication compliance going forward  - Was given aspirin 325 yesterday, loaded with Brilinta this morning,   - Continue aspirin 81, atorvastatin 40 HS, metoprolol 25 q 12h    - mild Donis, would hydrate with  NS @ 100 cc/hr pre cath  Hypertension:  Previously was started on amlodipine, lisinopril, has not been taking  Now started on metoprolol  Adjust therapy to reach goal < 130/80 mmHg    Hyperlipidemia:  Lipid panel revealed total cholesterol 331, triglyceride 841, HDL 37,   Started on atorvastatin 40 HS, given moderate-high TG levels, may benefit from addition of fenofibrate  Diabetes:  Untreated  HB A1c 13 3  History of portal vein thrombosis:  Previously took warfarin      Chronic pancreatitis with pancreatic pseudocyst, J tube In the past   History of cholecystectomy    Eosinophilic esophagitis              History of Present Illness   Physician Requesting Consult: Maurilio Cogan, MD  Reason for Consult / Principal Problem:  NSTEMI  HPI: Devan Lopez is a 38y o  year old male with a past medical history of diabetes, not on meds, Hb A1c 13, hypertension, former alcoholic with recurrent pancreatitis, history of pancreatic pseudocyst,, eosinophilic esophagitis, portal vein thrombosis previously was on warfarin for short period, presented with crushing substernal chest pain that started last night  Patient states his symptoms this time is very different compared to his prior epigastric discomfort associated with his pancreatitis/esophagitis  The pain started last night while he was standing, describes it as crushing substernal chest pain radiating through his precordium, worsened on exertion, a/w shortness of breath  He took Pepto-Bismol with no improvement  He then came to the ED for further evaluation  He denies prior episodes of similar symptoms  He does not smoke  No illicit drug abuse  He used to drink alcohol excessively but has not since this summer  He was adopted and does not know his family history  He was given 4 baby aspirin, his blood pressure was in the 180s on presentation  His troponin was initially 0 79 which increased to 2 84 this morning  He was started on a nitro drip this morning, his chest pain has improved with still has 3 to 4/10 pain  Consults    Review of Systems:  Review of Systems    As per HPI    Historical Information   Past Medical History:   Diagnosis Date    Diabetes mellitus ([facility] )     pre-diabetic     Hypertension     Pancreatitis     Portal vein thrombosis      Past Surgical History:   Procedure Laterality Date    ABDOMINAL SURGERY      CHOLECYSTECTOMY      MI EDG US EXAM SURGICAL ALTER STOM DUODENUM/JEJUNUM N/A 7/24/2017    Procedure: LINEAR ENDOSCOPIC U/S WITH AXIOS STENT;  Surgeon: Mariajose Holly MD;  [facility]; Service: Gastroenterology    MI EDG US EXAM SURGICAL ALTER STOM DUODENUM/JEJUNUM N/A 9/14/2017    Procedure: LINEAR ENDOSCOPIC U/S POSSIBLE AXIOS;  Surgeon: Mariajose Holly MD;  [facility];   Service: Gastroenterology    WISDOM TOOTH EXTRACTION      20 years ago     Social History     Substance and Sexual Activity   Alcohol Use Yes    Comment: socially -rarely     Social History     Substance and Sexual Activity   Drug Use No     Social History     Tobacco Use   Smoking Status Never Smoker   Smokeless Tobacco Never Used     Family History: non-contributory    Meds/Allergies   PTA meds:   Prior to Admission Medications   Prescriptions Last Dose Informant Patient Reported? Taking? Multiple Vitamins-Minerals (MULTIVITAMIN WITH MINERALS) tablet   Yes Yes   Sig: Take 1 tablet by mouth daily   Omega-3 Fatty Acids (FISH OIL) 1,000 mg   Yes Yes   Sig: Take 1,000 mg by mouth daily      Facility-Administered Medications: None     No Known Allergies    Objective   Vitals: Blood pressure 138/92, pulse 69, temperature 98 7 °F (37 1 °C), temperature source Oral, resp  rate 18, height 5' 10" (1 778 m), weight 86 2 kg (190 lb), SpO2 96 %  , Body mass index is 27 26 kg/m² ,   Orthostatic Blood Pressures      Most Recent Value   Blood Pressure  138/92 filed at 12/18/2019 9126   Patient Position - Orthostatic VS  Lying filed at 12/18/2019 0740            Intake/Output Summary (Last 24 hours) at 12/18/2019 0854  Last data filed at 12/18/2019 0748  Gross per 24 hour   Intake 110 ml   Output 1 ml   Net 109 ml       Invasive Devices     Peripheral Intravenous Line            Peripheral IV 12/18/19 Left Antecubital less than 1 day              Physical Exam    Gen: No acute distress  HEENT: anicteric, mucous membranes moist  Neck: supple, no jugular venous distention, or carotid bruit  Heart: regular, normal s1 and s2, no murmur/rub or gallop  Lungs :clear to auscultation bilaterally, no rales/rhonchi or wheeze  Abdomen: soft nontender, normoactive bowel sounds, no organomegaly  Ext: no edema    Lab Results:     Lab Results   Component Value Date    TROPONINI 2 84 (H) 12/18/2019    TROPONINI 0 79 (H) 12/18/2019    TROPONINI <0 04 12/14/2014       Lab Results   Component Value Date    GLUCOSE 100 06/26/2015    CALCIUM 9 5 12/18/2019     06/26/2015    K 3 2 (L) 12/18/2019    CO2 28 12/18/2019    CL 98 (L) 12/18/2019    BUN 10 12/18/2019    CREATININE 1 24 12/18/2019       Lab Results   Component Value Date    WBC 7 02 12/18/2019    HGB 15 9 12/18/2019    HCT 47 8 12/18/2019    MCV 78 (L) 12/18/2019     12/18/2019       Lab Results   Component Value Date    CHOL 186 12/15/2014     Lab Results   Component Value Date    HDL 37 (L) 12/18/2019    HDL 46 12/15/2014     Lab Results   Component Value Date    LDLCALC  12/18/2019      Comment:      Calculated LDL invalid, triglycerides >400 mg/dl    LDLCALC 114 (H) 12/15/2014     Lab Results   Component Value Date    TRIG 841 (H) 12/18/2019    TRIG 129 12/15/2014       Lab Results   Component Value Date    ALT 41 12/18/2019    AST 27 12/18/2019       Results from last 7 days   Lab Units 12/18/19  0459   INR  0 96         Imaging: I have personally reviewed pertinent reports

## 2019-12-18 NOTE — QUICK NOTE
Patient still c/o 7/10 chest pain after receiving nitro  BP also still significantly elevated  Will start nitro drip, follow up troponin

## 2019-12-18 NOTE — SOCIAL WORK
CM met with pt to discuss CM role in D/C planning  Pt reported the following:    Emergency Contact: MotherAmanda [phone]  POA/LW: None  Level of assist with ADL's PTA: IND  House or Apt: Lives with parents in 2 sh  VANNESA to enter: 8 VANNESA to enter; 12-14 to VANNESA to 2nd floor  DME: None  VNA: Hx however pt unable to recall the provider  SNF/Rehab: None    Transportation: Lyft, bus, mother or friend  Help at home: Parents    Pharmacy/Rx Coverage: [facility]   Name of PCP: Dr Mandi Tyson tx for MH/SA: None    Employment/Income: Employed    Anticipated D/C Plan: Return home with parents and mother will transport  Pt reported no use of ETOH since July 2019 and he declined HOST  CM provided pt with Advance Directive Brochure and [employer] per consult  Pt was appreciative  CM reviewed d/c planning process including the following: identifying help at home, patient preference for d/c planning needs, Discharge Lounge, [facility] Meds to Bed program, availability of treatment team to discuss questions or concerns patient and/or family may have regarding understanding medications and recognizing signs and symptoms once discharged  CM also encouraged patient to follow up with all recommended appointments after discharge  Patient advised of importance for patient and family to participate in managing patients medical well being

## 2019-12-18 NOTE — ASSESSMENT & PLAN NOTE
Lab Results   Component Value Date    HGBA1C 6 8 (H) 07/04/2017       No results for input(s): POCGLU in the last 72 hours  Blood Sugar Average: Last 72 hrs:     Patient admits to not taking metformin at home  Glucose was 379 on admission     Will give 10 units of Humalog now  [employer] pt on proper dietary compliance   Hold oral antihyperglycemics   Continue glucose checks   Basal insulin:  Lantus 15 units at bedtime   Mealtime insulin:  Humalog 5 units t i d    Correctional scale insulin: algorithm 3 with meals and at bedtime

## 2019-12-18 NOTE — PLAN OF CARE
Problem: Potential for Falls  Goal: Patient will remain free of falls  Description  INTERVENTIONS:  - Assess patient frequently for physical needs  -  Identify cognitive and physical deficits and behaviors that affect risk of falls    -  [place] fall precautions as indicated by assessment   - Educate patient/family on patient safety including physical limitations  - Instruct patient to call for assistance with activity based on assessment  - Modify environment to reduce risk of injury  - Consider OT/PT consult to assist with strengthening/mobility  Outcome: Progressing     Problem: PAIN - ADULT  Goal: Verbalizes/displays adequate comfort level or baseline comfort level  Description  Interventions:  - Encourage patient to monitor pain and request assistance  - Assess pain using appropriate pain scale  - Administer analgesics based on type and severity of pain and evaluate response  - Implement non-pharmacological measures as appropriate and evaluate response  - Consider cultural and social influences on pain and pain management  - Notify physician/advanced practitioner if interventions unsuccessful or patient reports new pain  Outcome: Progressing     Problem: INFECTION - ADULT  Goal: Absence or prevention of progression during hospitalization  Description  INTERVENTIONS:  - Assess and monitor for signs and symptoms of infection  - Monitor lab/diagnostic results  - Monitor all insertion sites, i e  indwelling lines, tubes, and drains  - Monitor endotracheal if appropriate and nasal secretions for changes in amount and color  - Arnold appropriate cooling/warming therapies per order  - Administer medications as ordered  - Instruct and encourage patient and family to use good hand hygiene technique  - Identify and instruct in appropriate isolation precautions for identified infection/condition  Outcome: Progressing  Goal: Absence of fever/infection during neutropenic period  Description  INTERVENTIONS:  - Monitor WBC    Outcome: Progressing     Problem: SAFETY ADULT  Goal: Maintain or return to baseline ADL function  Description  INTERVENTIONS:  -  Assess patient's ability to carry out ADLs; assess patient's baseline for ADL function and identify physical deficits which impact ability to perform ADLs (bathing, care of mouth/teeth, toileting, grooming, dressing, etc )  - Assess/evaluate cause of self-care deficits   - Assess range of motion  - Assess patient's mobility; develop plan if impaired  - Assess patient's need for assistive devices and provide as appropriate  - Encourage maximum independence but intervene and supervise when necessary  - Involve family in performance of ADLs  - Assess for home care needs following discharge   - Consider OT consult to assist with ADL evaluation and planning for discharge  - Provide patient education as appropriate  Outcome: Progressing  Goal: Maintain or return mobility status to optimal level  Description  INTERVENTIONS:  - Assess patient's baseline mobility status (ambulation, transfers, stairs, etc )    - Identify cognitive and physical deficits and behaviors that affect mobility  - Identify mobility aids required to assist with transfers and/or ambulation (gait belt, sit-to-stand, lift, walker, cane, etc )  - [place] fall precautions as indicated by assessment  - Record patient progress and toleration of activity level on Mobility SBAR; progress patient to next Phase/Stage  - Instruct patient to call for assistance with activity based on assessment  - Consider rehabilitation consult to assist with strengthening/weightbearing, etc   Outcome: Progressing     Problem: DISCHARGE PLANNING  Goal: Discharge to home or other facility with appropriate resources  Description  INTERVENTIONS:  - Identify barriers to discharge w/patient and caregiver  - Arrange for needed discharge resources and transportation as appropriate  - Identify discharge learning needs (meds, wound care, etc )  - Arrange for interpretive services to assist at discharge as needed  - Refer to Case Management Department for coordinating discharge planning if the patient needs post-hospital services based on physician/advanced practitioner order or complex needs related to functional status, cognitive ability, or social support system  Outcome: Progressing     Problem: Knowledge Deficit  Goal: Patient/family/caregiver demonstrates understanding of disease process, treatment plan, medications, and discharge instructions  Description  Complete learning assessment and assess knowledge base    Interventions:  - Provide teaching at level of understanding  - Provide teaching via preferred learning methods  Outcome: Progressing

## 2019-12-18 NOTE — ASSESSMENT & PLAN NOTE
Patient currently denies abdominal pain  Also says he has not drank since summer    · Currently stable, will monitor

## 2019-12-18 NOTE — H&P
H&P- Madhavi Loza 1975, 38 y o  male MRN: [id number]    Unit/Bed#: CW2 218-01 Encounter: [id number]    Primary Care Provider: Tye Dee DO   Date and time admitted to hospital: 12/18/2019  3:09 AM        * NSTEMI (non-ST elevated myocardial infarction) [facility])  Assessment & Plan  Initial troponin 0 79  EKG demonstrated nonischemic changes  · Echo: Will obtain  · Follow up repeat troponins  · Repeat EKG  · Continue aspirin, statin, heparin drip  · Start metoprolol b i d   · If patient still having chest pain refractory to sublingual nitro for refractory hypertension, will start on nitro drip  · Cardiology consult    Type 2 diabetes mellitus without complication, without long-term current use of insulin ([facility] )  Assessment & Plan  Lab Results   Component Value Date    HGBA1C 6 8 (H) 07/04/2017       No results for input(s): POCGLU in the last 72 hours  Blood Sugar Average: Last 72 hrs:     Patient admits to not taking metformin at home  Glucose was 379 on admission   Will give 10 units of Humalog now  [employer] pt on proper dietary compliance   Hold oral antihyperglycemics   Continue glucose checks   Basal insulin:  Lantus 15 units at bedtime   Mealtime insulin:  Humalog 5 units t i d    Correctional scale insulin: algorithm 3 with meals and at bedtime    Gastroesophageal reflux disease with esophagitis  Assessment & Plan  Patient currently not taking omeprazole  Will start Pepcid p r n  Hypokalemia  Assessment & Plan  Will give 80 mEq oral now  Monitor serum electrolytes    Essential hypertension  Assessment & Plan  Patient admits to not taking any his blood pressure medication at home  · Start metoprolol b i d  In setting of NSTEMI  · Follow-up cardiology consult  · If blood pressure refractory, will start on nitro drip    Alcohol-induced chronic pancreatitis [facility])  Assessment & Plan  Patient currently denies abdominal pain  Also says he has not drank since summer    · Currently stable, will monitor      [facility]    Patient Information: Anselmo Lynn 38 y o  male MRN: [id number]  Unit/Bed#: CW2 218-01 Encounter: [id number]  Admitting Physician: Isabel Stafford DO  PCP: Russlel West DO  Date of Admission:  12/18/19      VTE Prophylaxis: Heparin Drip  / sequential compression device   Code Status: Level 1 - Full Code  POLST: POLST form is not discussed and not completed at this time  Anticipated Length of Stay:  Patient will be admitted on an Inpatient basis with an anticipated length of stay of > 2 midnights  Justification for Hospital Stay: Please see detailed plans noted above  Total Time for Visit, including Counseling / Coordination of Care: 45 minutes  Greater than 50% of this total time spent on direct patient counseling and coordination of care  Chief Complaint:    Chest pain    History of Present Illness:    Anselmo Lynn is a 38 y o  male who presents with chest pain that started 730 last night at work  He has known history of type 2 diabetes mellitus, hypertension, alcohol use, pancreatitis and pancreatic pseudocyst, and hepatic vein thrombosis  He works at [employer]  Chest pain is central, nonradiating  Pain got better after taking a break, but eventually got worse as the night went on  Also c/o SOB, diaphoresis, nausea  He has never had MI or CVA in the past  Never had an episode like this before  He has known hx alcoholic pancreatitis  However, he says he has not drank alcohol since June or July  In the ED, chest x-ray was unremarkable  He had an elevated troponin of 0 79, potassium 3 2, creatinine of 1 24, glucose of 372  His initial blood pressure was significantly elevated at 186/130  EKG was nonischemic, but he was started on heparin drip  He was admitted for treatment of NSTEMI  Of note, patient only takes a multivitamin and Omega fish oil at home    He normally follows up with [facility], but has not followed up with them in over a year  He was on metformin, lisinopril, and omeprazole before, but he says he stopped taking all those medications because he ran out, never called for refills, and assumed he did not have to keep taking them  On my exam, patient's blood pressure is still elevated  He is also still complaining of some chest discomfort  He is in no acute distress, not tachypnea  Review of Systems:    Review of Systems   Constitutional: Positive for diaphoresis  Negative for activity change, chills and fever  HENT: Negative  Negative for hearing loss, sore throat and trouble swallowing  Eyes: Negative for visual disturbance  Respiratory: Positive for shortness of breath  Negative for cough and wheezing  Cardiovascular: Positive for chest pain  Negative for palpitations and leg swelling  Gastrointestinal: Positive for nausea  Negative for abdominal distention, abdominal pain, blood in stool, constipation, diarrhea and vomiting  Endocrine: Positive for polydipsia and polyuria  Genitourinary: Negative for dysuria, frequency and hematuria  Musculoskeletal: Negative for arthralgias, joint swelling and myalgias  Skin: Negative  Allergic/Immunologic: Negative for immunocompromised state  Neurological: Negative for dizziness, facial asymmetry, speech difficulty, weakness, light-headedness, numbness and headaches  Hematological: Negative  Psychiatric/Behavioral: Negative for confusion         Past Medical and Surgical History:     Past Medical History:   Diagnosis Date    Diabetes mellitus ([facility] [address] )     pre-diabetic     Hypertension     Pancreatitis     Portal vein thrombosis        Past Surgical History:   Procedure Laterality Date    ABDOMINAL SURGERY      CHOLECYSTECTOMY      MD EDG US EXAM SURGICAL ALTER STOM DUODENUM/JEJUNUM N/A 7/24/2017    Procedure: LINEAR ENDOSCOPIC U/S WITH AXIOS STENT;  Surgeon: Gino Erwin MD;  [facility];  Service: Gastroenterology    [place] EDG US EXAM SURGICAL ALTER STOM DUODENUM/JEJUNUM N/A 9/14/2017    Procedure: LINEAR ENDOSCOPIC U/S POSSIBLE AXIOS;  Surgeon: Kang Portillo MD;  [facility]; Service: Gastroenterology       Meds/Allergies:    Prior to Admission medications    Medication Sig Start Date End Date Taking? Authorizing Provider   Multiple Vitamins-Minerals (MULTIVITAMIN WITH MINERALS) tablet Take 1 tablet by mouth daily   Yes Historical Provider, MD   Omega-3 Fatty Acids (FISH OIL) 1,000 mg Take 1,000 mg by mouth daily   Yes Historical Provider, MD   lisinopril (ZESTRIL) 10 mg tablet Take 10 mg by mouth daily  12/18/19  Historical Provider, MD     I have reviewed home medications with patient personally  Allergies: No Known Allergies    Social History:     Marital Status: Single   Occupation: [employer]  Patient Pre-hospital Living Situation: with parents  Patient Pre-hospital Level of Mobility: Ambulatory  Patient Pre-hospital Diet Restrictions: None  Substance Use History:   Social History     Substance and Sexual Activity   Alcohol Use Yes    Comment: socially -rarely     Social History     Tobacco Use   Smoking Status Never Smoker   Smokeless Tobacco Never Used     Social History     Substance and Sexual Activity   Drug Use No       Family History:    History reviewed  No pertinent family history  Physical Exam:     Vitals:   Blood Pressure: (!) 166/117 (12/18/19 0513)  Pulse: 71 (12/18/19 0513)  Temperature: 98 3 °F (36 8 °C) (12/18/19 0513)  Temp Source: Oral (12/18/19 0311)  Respirations: 18 (12/18/19 0513)  Height: 5' 10" (177 8 cm) (12/18/19 0311)  Weight - Scale: 86 2 kg (190 lb) (12/18/19 0311)  SpO2: 97 % (12/18/19 0513)    Physical Exam   Constitutional: He is oriented to person, place, and time  He appears well-developed and well-nourished  No distress  HENT:   Head: Normocephalic and atraumatic  Nose: Nose normal    Mouth/Throat: No oropharyngeal exudate  Eyes: Pupils are equal, round, and reactive to light  Conjunctivae and EOM are normal    Neck: Normal range of motion  Neck supple  No JVD present  Cardiovascular: Normal rate, regular rhythm, normal heart sounds and intact distal pulses  Pulmonary/Chest: Effort normal and breath sounds normal  No respiratory distress  Abdominal: Soft  Normal appearance  He exhibits no distension  There is no tenderness  There is no guarding  Musculoskeletal: Normal range of motion  He exhibits no edema, tenderness or deformity  Lymphadenopathy:     He has no cervical adenopathy  Neurological: He is alert and oriented to person, place, and time  He has normal strength  No cranial nerve deficit or sensory deficit  Skin: Skin is warm and dry  No rash noted  He is not diaphoretic  Psychiatric: He has a normal mood and affect  His behavior is normal    Vitals reviewed  Additional Data:     Lab Results: I have personally reviewed pertinent reports  Results from last 7 days   Lab Units 12/18/19  0321   WBC Thousand/uL 7 02   HEMOGLOBIN g/dL 15 9   HEMATOCRIT % 47 8   PLATELETS Thousands/uL 211   NEUTROS PCT % 77*   LYMPHS PCT % 12*   MONOS PCT % 7   EOS PCT % 3     Results from last 7 days   Lab Units 12/18/19  0321   POTASSIUM mmol/L 3 2*   CHLORIDE mmol/L 98*   CO2 mmol/L 28   BUN mg/dL 10   CREATININE mg/dL 1 24   CALCIUM mg/dL 9 5   ALK PHOS U/L 169*   ALT U/L 41   AST U/L 27     Results from last 7 days   Lab Units 12/18/19  0459   INR  0 96       Imaging: I have personally reviewed pertinent reports  and I have personally reviewed pertinent films in PACS    No results found  EKG, Pathology, and Other Studies Reviewed on Admission:   · EKG: Sinus rhythm    [facility] Review[facility]: Yes     Portions of the record may have been created with voice recognition software    Occasional wrong word or "sound a like" substitutions may have occurred due to the inherent limitations of voice recognition software  Read the chart carefully and recognize, using context, where substitutions have occurred

## 2019-12-18 NOTE — ASSESSMENT & PLAN NOTE
· Triglyceride levels greater than 800  Now started on atorvastatin  Would also benefit from addition of a fenofibrate

## 2019-12-18 NOTE — NURSING NOTE
Upon review of orders, noticed that patient has an order for 10 units of Humalog insulin, which is fast acting  Also noticed that patient's blood sugar wasn't checked but the patient had labwork done and it resulted that patient had blood glucose of 372  Checked blood sugar at bedside and it was 263  Paged SLIM in order to clarify  Salo Pinon called back and stated it is okay to give now  Will recheck blood sugar at bedside at 0630  Will continue to monitor

## 2019-12-18 NOTE — ED PROVIDER NOTES
History  Chief Complaint   Patient presents with    Chest Pain     Pt states that at 7:30pm he developed chest pain while at work, check BP with was high coworkers suggested coming to the ED  Upon arrival reports a 9/10 "squeezing" mid sternal/epigastric pain  37 yo M presents to ED for chest pain  Pt says chest pain began while he was at work at 7:30 pm  He says he was just standing still when this started  Pt works at [employer] and says that over the rest of his shift chest pain gradually got worse, and he was intermittently lifting heavy objects  Pt says he finally decided he couldn't take the chest pain anymore and was seen by medical personnel and found to have high blood pressure and sent to the ED for evaluation  Pt says that since he has been here, the chest pain has been improving, and it is now actually gone  Chest pain was not associated with shortness of breath, but he did have intermittent bilateral arm tingling  No diaphoresis, nausea/vomiting, or lightheadedness  Prior to Admission Medications   Prescriptions Last Dose Informant Patient Reported? Taking? Multiple Vitamins-Minerals (MULTIVITAMIN WITH MINERALS) tablet   Yes Yes   Sig: Take 1 tablet by mouth daily   Omega-3 Fatty Acids (FISH OIL) 1,000 mg   Yes Yes   Sig: Take 1,000 mg by mouth daily      Facility-Administered Medications: None       Past Medical History:   Diagnosis Date    Diabetes mellitus ([facility] [address] )     pre-diabetic     Hypertension     Pancreatitis     Portal vein thrombosis        Past Surgical History:   Procedure Laterality Date    ABDOMINAL SURGERY      CHOLECYSTECTOMY      OK EDG US EXAM SURGICAL ALTER STOM DUODENUM/JEJUNUM N/A 7/24/2017    Procedure: LINEAR ENDOSCOPIC U/S WITH AXIOS STENT;  Surgeon: Deisy Ponce MD;  Location: [facility];   Service: Gastroenterology    OK EDG US EXAM SURGICAL ALTER STOM DUODENUM/JEJUNUM N/A 9/14/2017    Procedure: LINEAR ENDOSCOPIC U/S POSSIBLE AXIOS;  Surgeon: Deisy Ponce MD; Location: [facility]; Service: Gastroenterology    WISDOM TOOTH EXTRACTION      20 years ago       History reviewed  No pertinent family history  I have reviewed and agree with the history as documented  Social History     Tobacco Use    Smoking status: Never Smoker    Smokeless tobacco: Never Used   Substance Use Topics    Alcohol use: Yes     Comment: socially -rarely    Drug use: No        Review of Systems   Constitutional: Negative for chills, fatigue and fever  HENT: Negative for congestion, rhinorrhea, sore throat and trouble swallowing  Eyes: Negative for pain, discharge and visual disturbance  Respiratory: Negative for cough, chest tightness and shortness of breath  Cardiovascular: Positive for chest pain  Negative for palpitations and leg swelling  Gastrointestinal: Negative for abdominal pain, nausea and vomiting  Genitourinary: Negative for decreased urine volume, dysuria, frequency and urgency  Musculoskeletal: Negative for gait problem, neck pain and neck stiffness  Skin: Negative for color change, rash and wound  Neurological: Negative for dizziness, syncope, light-headedness and headaches  Physical Exam  ED Triage Vitals [12/18/19 0311]   Temperature Pulse Respirations Blood Pressure SpO2   98 1 °F (36 7 °C) 83 17 (!) 186/130 99 %      Temp Source Heart Rate Source Patient Position - Orthostatic VS BP Location FiO2 (%)   Oral Monitor Lying Right arm --      Pain Score       9             Orthostatic Vital Signs  Vitals:    12/18/19 0513 12/18/19 0536 12/18/19 0545 12/18/19 0602   BP: (!) 166/117 (!) 165/114 (!) 160/107 (!) 156/103   Pulse: 71 89 85 65   Patient Position - Orthostatic VS:  Sitting         Physical Exam   Constitutional: He is oriented to person, place, and time  He appears well-developed and well-nourished  No distress  HENT:   Head: Normocephalic and atraumatic     Mouth/Throat: Oropharynx is clear and moist    Eyes: Conjunctivae and EOM are normal  Right eye exhibits no discharge  Left eye exhibits no discharge  Neck: Normal range of motion  Neck supple  Cardiovascular: Normal rate, regular rhythm and intact distal pulses  Pulmonary/Chest: Effort normal and breath sounds normal  No stridor  No respiratory distress  Abdominal: Soft  Bowel sounds are normal  There is no tenderness  There is no rebound and no guarding  Musculoskeletal: Normal range of motion  He exhibits no edema or tenderness  Right lower leg: He exhibits no tenderness and no edema  Left lower leg: He exhibits no tenderness and no edema  Neurological: He is alert and oriented to person, place, and time  No sensory deficit  He exhibits normal muscle tone  Skin: Skin is warm and dry  Capillary refill takes less than 2 seconds  Nursing note and vitals reviewed        ED Medications  Medications   heparin (porcine) 25,000 units in 250 mL infusion (premix) (11 8 Units/kg/hr × 85 kg (Order-Specific) Intravenous New Bag 12/18/19 5217)   heparin (porcine) injection 4,000 Units (has no administration in time range)   heparin (porcine) injection 2,000 Units (has no administration in time range)   nitroglycerin (NITROSTAT) SL tablet 0 4 mg (0 4 mg Sublingual Given 12/18/19 0541)   acetaminophen (TYLENOL) tablet 650 mg (650 mg Oral Given 12/18/19 0532)   insulin lispro (HumaLOG) 100 units/mL subcutaneous injection 5 Units (has no administration in time range)   insulin lispro (HumaLOG) 100 units/mL subcutaneous injection 1-6 Units (has no administration in time range)   insulin lispro (HumaLOG) 100 units/mL subcutaneous injection 1-6 Units (has no administration in time range)   ondansetron (ZOFRAN) injection 4 mg (has no administration in time range)   aspirin chewable tablet 81 mg (has no administration in time range)   atorvastatin (LIPITOR) tablet 40 mg (40 mg Oral Not Given 12/18/19 0513)   metoprolol tartrate (LOPRESSOR) tablet 25 mg (25 mg Oral Given 12/18/19 7903)   potassium chloride (K-DUR,KLOR-CON) CR tablet 40 mEq (40 mEq Oral Given 12/18/19 0521)   famotidine (PEPCID) tablet 20 mg (has no administration in time range)   insulin glargine (LANTUS) subcutaneous injection 15 Units 0 15 mL (has no administration in time range)   influenza vaccine, quadrivalent (FLUARIX) IM injection 0 5 mL (has no administration in time range)   sodium chloride 0 9 % bolus 1,000 mL (1,000 mL Intravenous New Bag 12/18/19 0425)   heparin (porcine) injection 4,000 Units (4,000 Units Intravenous Given 12/18/19 0426)   insulin lispro (HumaLOG) 100 units/mL subcutaneous injection 10 Units (10 Units Subcutaneous Given 12/18/19 0542)   nitroglycerin (NITROSTAT) SL tablet 0 4 mg (0 4 mg Sublingual Given 12/18/19 0527)       Diagnostic Studies  Results Reviewed     Procedure Component Value Units Date/Time    LDL cholesterol, direct [[id number]]  (Abnormal) Collected:  12/18/19 0321    Lab Status:  Final result Specimen:  Blood from Arm, Left Updated:  12/18/19 0549     LDL Direct 128 mg/dl     Narrative:       LDL Cholesterol:        Optimal           <100 mg/dl      Near Optimal      100-129 mg/dl      Above Optimal       Borderline High  130-159 mg/dl       High             160-189 mg/dl       Very High        >189 mg/dl    Hemoglobin A1c w/EAG Estimation (Orders if not completed within the last 90 days) [[id number]]  (Abnormal) Collected:  12/18/19 0321    Lab Status:  Final result Specimen:  Blood from Arm, Left Updated:  12/18/19 0540     Hemoglobin A1C 13 3 %       mg/dl     Lipid Panel with Direct LDL reflex [[id number]]  (Abnormal) Collected:  12/18/19 0321    Lab Status:  Final result Specimen:  Blood from Arm, Left Updated:  12/18/19 0535     Cholesterol 331 mg/dL      Triglycerides 841 mg/dL      HDL, Direct 37 mg/dL      LDL Calculated --    APTT six (6) hours after Heparin bolus/drip initiation or dosing change [[id number]]  (Normal) Resulted:  12/18/19 0459    Lab Status:  Final result Specimen:  Blood Updated:  12/18/19 0459     PTT 28 seconds     Protime-INR [[id number]]  (Normal) Resulted:  12/18/19 0459    Lab Status:  Final result Specimen:  Blood Updated:  12/18/19 0459     Protime 12 4 seconds      INR 0 96    APTT [[id number]] Collected:  12/18/19 0421    Lab Status:  No result Specimen:  Blood from Arm, Right     Comprehensive metabolic panel [[id number]]  (Abnormal) Collected:  12/18/19 0321    Lab Status:  Final result Specimen:  Blood from Arm, Left Updated:  12/18/19 0348     Sodium 133 mmol/L      Potassium 3 2 mmol/L      Chloride 98 mmol/L      CO2 28 mmol/L      ANION GAP 7 mmol/L      BUN 10 mg/dL      Creatinine 1 24 mg/dL      Glucose 372 mg/dL      Calcium 9 5 mg/dL      AST 27 U/L      ALT 41 U/L      Alkaline Phosphatase 169 U/L      Total Protein 8 1 g/dL      Albumin 4 4 g/dL      Total Bilirubin 0 43 mg/dL      eGFR 70 ml/min/1 73sq m     Narrative:       [place] guidelines for Chronic Kidney Disease (CKD):     Stage 1 with normal or high GFR (GFR > 90 mL/min/1 73 square meters)    Stage 2 Mild CKD (GFR = 60-89 mL/min/1 73 square meters)    Stage 3A Moderate CKD (GFR = 45-59 mL/min/1 73 square meters)    Stage 3B Moderate CKD (GFR = 30-44 mL/min/1 73 square meters)    Stage 4 Severe CKD (GFR = 15-29 mL/min/1 73 square meters)    Stage 5 End Stage CKD (GFR <15 mL/min/1 73 square meters)  Note: GFR calculation is accurate only with a steady state creatinine    Troponin I [[id number]]  (Abnormal) Collected:  12/18/19 0321    Lab Status:  Final result Specimen:  Blood from Arm, Left Updated:  12/18/19 0348     Troponin I 0 79 ng/mL     CBC and differential [[id number]]  (Abnormal) Collected:  12/18/19 0321    Lab Status:  Final result Specimen:  Blood from Arm, Left Updated:  12/18/19 0333     WBC 7 02 Thousand/uL      RBC 6 10 Million/uL      Hemoglobin 15 9 g/dL      Hematocrit 47 8 %      MCV 78 fL      MCH 26 1 pg      MCHC 33 3 g/dL      RDW 12 3 %      MPV 10 8 fL      Platelets 984 Thousands/uL      nRBC 0 /100 WBCs      Neutrophils Relative 77 %      Immat GRANS % 0 %      Lymphocytes Relative 12 %      Monocytes Relative 7 %      Eosinophils Relative 3 %      Basophils Relative 1 %      Neutrophils Absolute 5 38 Thousands/µL      Immature Grans Absolute 0 03 Thousand/uL      Lymphocytes Absolute 0 86 Thousands/µL      Monocytes Absolute 0 51 Thousand/µL      Eosinophils Absolute 0 20 Thousand/µL      Basophils Absolute 0 04 Thousands/µL     Lipase [[id number]] Collected:  12/18/19 0322    Lab Status:  No result Specimen:  Blood from Arm, Left                  XR chest 2 views   Final Result by Summer Quinones MD (12/18 0505)      No acute cardiopulmonary disease  Workstation performed: [id number]               Procedures  ECG 12 Lead Documentation Only  Date/Time: 12/18/2019 3:26 AM  Performed by: Tyree Contreras MD  Authorized by:  Tyree Contreras MD     Indications / Diagnosis:  Chest pain  ECG reviewed by me, the ED Provider: yes    Patient location:  ED  Rate:     ECG rate:  80    ECG rate assessment: normal    Rhythm:     Rhythm: sinus rhythm    Conduction:     Conduction: normal    ST segments:     ST segments:  Normal  T waves:     T waves: flattening and inverted      Flattening:  V5    Inverted:  V4          ED Course         HEART Risk Score      Most Recent Value   History  1 Filed at: 12/18/2019 0329   ECG  1 Filed at: 12/18/2019 0329   Age  0 Filed at: 12/18/2019 0329   Risk Factors  1 Filed at: 12/18/2019 0329   Troponin  0 Filed at: 12/18/2019 0329   Heart Score Risk Calculator   History  1 Filed at: 12/18/2019 0329   ECG  1 Filed at: 12/18/2019 0329   Age  0 Filed at: 12/18/2019 0329   Risk Factors  1 Filed at: 12/18/2019 0329   Troponin  0 Filed at: 12/18/2019 0329   HEART Score  3 Filed at: 12/18/2019 0329   HEART Score  3 Filed at: 12/18/2019 0329                    GM Risk Score      Most Recent Value   Age >= 65  0 Filed at: 12/18/2019 0445   Known CAD (stenosis >= 50%)  0 Filed at: 12/18/2019 0445   Recent (<=24 hrs) Service Angina  1 Filed at: 12/18/2019 0445   ST Deviation >= 0 5 mm  0 Filed at: 12/18/2019 0445   3+ CAD Risk Factors (FHx, HTN, HLP, DM, Smoker)  1 Filed at: 12/18/2019 0445   Aspirin Use Past 7 Days  0 Filed at: 12/18/2019 0445   Elevated Cardiac Markers  1 Filed at: 12/18/2019 0445   GM Risk Score (Calculated)  3 Filed at: 12/18/2019 0445              MDM  Number of Diagnoses or Management Options  Chest pain:   Elevated troponin:   Diagnosis management comments: Chest pain with elevated troponin, elevated bp, new T wave changes on EKG  Pt was already given ASA  Will start heparin drip  Nitro  Admit to slim        Disposition  Final diagnoses:   Chest pain   Elevated troponin     Time reflects when diagnosis was documented in both MDM as applicable and the Disposition within this note     Time User Action Codes Description Comment    12/18/2019  4:13 AM Kirstin Pike Add [R07 9] Chest pain     12/18/2019  4:13 AM Db Cheese [R79 89] Elevated troponin     12/18/2019  5:02 AM Milton Weinstein Add [I21 4] NSTEMI (non-ST elevated myocardial infarction) [facility])       ED Disposition     ED Disposition Condition Date/Time Comment    Admit Stable Wed Dec 18, 2019  4:12 AM Case was discussed with RICHAR and the patient's admission status was agreed to be Admission Status: inpatient status to the service of Dr Cale Lr   Follow-up Information    None         Current Discharge Medication List      CONTINUE these medications which have NOT CHANGED    Details   Multiple Vitamins-Minerals (MULTIVITAMIN WITH MINERALS) tablet Take 1 tablet by mouth daily      Omega-3 Fatty Acids (FISH OIL) 1,000 mg Take 1,000 mg by mouth daily           No discharge procedures on file  ED Provider  Attending physically available and evaluated Sid Thompson I managed the patient along with the ED Attending      Electronically Signed by Fermin Mckenzie MD  12/18/19 9299

## 2019-12-18 NOTE — QUICK NOTE
Notified at 7:33 by SLIM that the patient was having CP despite SL nitro  Patient w hx of DM, HLD, chronic pancreatitis, alcoholism  Presented with CP since last night at 7pm  Trop went from 0 79--> >2    On evaluation at bedside patient had 4/10 CP on 15 mcg of nitro gtt  /93   Pulse 77   Temp 98 7 °F (37 1 °C) (Oral)   Resp 18   Ht 5' 10" (1 778 m)   Wt 86 2 kg (190 lb)   SpO2 96%   BMI 27 26 kg/m²      BP at the time of assessment was 150/102    Physical exam demonstrated normal heart and lung sounds and no tenderness over abdomen  EKG showed new TWI in v3-v6, no evidence of VANNESA   These changes were dynamic from prior ekg    A/P:    NSTEMI  Keep NPO, unfortunately patient ate breakfast  Cath now if CP not relieved otherwise this afternoon  Patient loaded w AsA previously, ct asa, statin  Load ticagrelor 180 now  Check echo  Patient signed out to day team

## 2019-12-18 NOTE — ASSESSMENT & PLAN NOTE
· Blood pressure acceptable  Continue beta-blocker  Patient was previously started on amlodipine and lisinopril which he has not been taking

## 2019-12-18 NOTE — ASSESSMENT & PLAN NOTE
Lab Results   Component Value Date    HGBA1C 13 3 (H) 12/18/2019       Recent Labs     12/18/19  0529 12/18/19  0644   POCGLU 263* 222*          Uncontrolled  Was told that he was a borderline diabetic approximately 2 years ago and took a 1 month supply of metformin  Has had no follow-up since  Will need insulin on discharge, this was discussed with patient   Started on Lantus 15 units q h s  Along with 5 units Humalog with meals  Continue sliding scale    Hazel Reyes Consult endocrinology for recommendations   Diabetic diet when no longer NPO   Nutrition consult   Monitor Accu-Cheks

## 2019-12-18 NOTE — NURSING NOTE
Pt arrived from the ED with chest pain, 7/10 squeezing pain in mid sternum area  No SOB or diaphoresis noted  Elevated BP, most recent one being 164/114 on his right arm  Pt given nitro once as indicated in order, but pain hasn't subsided  Second nitro given  Blood pressure now 160/107, chest pain now 4/10    Will continue to monitor

## 2019-12-18 NOTE — ED ATTENDING ATTESTATION
12/18/2019  ISteffany MD, saw and evaluated the patient  I have discussed the patient with the resident/non-physician practitioner and agree with the resident's/non-physician practitioner's findings, Plan of Care, and MDM as documented in the resident's/non-physician practitioner's note, except where noted  All available labs and Radiology studies were reviewed  I was present for key portions of any procedure(s) performed by the resident/non-physician practitioner and I was immediately available to provide assistance  At this point I agree with the current assessment done in the Emergency Department  I have conducted an independent evaluation of this patient a history and physical is as follows: This is a 38 y o  old male who presents to the ED for evaluation of chest pain  Was at work, lifting moving items  Chest pain was constant since onset a few hours ago  Since he got here, it has resolved  VS and nursing notes reviewed  General: Appears in NAD, awake, alert, speaking normally in full sentences  Well-nourished, well-developed  Appears stated age  Head: Normocephalic, atraumatic  Eyes: EOMI  Vision grossly normal  No subconjunctival hemorrhages or occular discharge noted  Symmetrical lids  ENT: Atraumatic external nose and ears  No stridor  Normal phonation  No drooling  Normal swallowing  Neck: No JVD  FROM  No goiter  CV: No pallor  Normal rate  Lungs: No tachypnea  No respiratory distress  MSK: Moving all extremities equally, no peripheral edema  Skin: Dry, intact  No cyanosis  Neuro: Awake, alert, GCS15  CN II-XII grossly intact  Grossly normal gait  Psychiatric/Behavioral: Appropriate mood and affect  A/P: This is a 38 y o  male who presents to the ED for evaluation of chest pain  Cardiac eval  Delta  Reevaluate and dispo accordingly      ED Course         Critical Care Time  Procedures

## 2019-12-18 NOTE — ASSESSMENT & PLAN NOTE
Initial troponin 0 79  EKG demonstrated nonischemic changes  · Echo:   Will obtain  · Follow up repeat troponins  · Repeat EKG  · Continue aspirin, statin, heparin drip  · Start metoprolol b i d   · If patient still having chest pain refractory to sublingual nitro for refractory hypertension, will start on nitro drip  · Cardiology consult

## 2019-12-18 NOTE — PROGRESS NOTES
[facility]    Progress Note - Best Daniel 1975, 38 y o  male MRN: [id number]    Unit/Bed#: CW2 218-01 Encounter: [id number]    Primary Care Provider: Haydee Habermann, DO   Date and time admitted to hospital: 12/18/2019  3:09 AM        * NSTEMI (non-ST elevated myocardial infarction) [facility])  Assessment & Plan  · Patient admitted with chest pain  Found to have elevated troponin at 0 79, increasing to 2 84  Currently on heparin drip as well as nitro drip for chest pain  · EKG with T-wave inversions V4 to V6  · Cardiology following, plan is for cardiac catheterization today  · Started on aspirin, statin, beta-blocker  Loaded with Brilinta this morning  · Telemetry  · Additional recommendations based on cardiac catheterization    Type 2 diabetes mellitus without complication, without long-term current use of insulin [facility])  Assessment & Plan  Lab Results   Component Value Date    HGBA1C 13 3 (H) 12/18/2019       Recent Labs     12/18/19  0529 12/18/19  0644   POCGLU 263* 222*          Uncontrolled  Was told that he was a borderline diabetic approximately 2 years ago and took a 1 month supply of metformin  Has had no follow-up since  Will need insulin on discharge, this was discussed with patient   Started on Lantus 15 units q h s  Along with 5 units Humalog with meals  Continue sliding scale   Consult endocrinology for recommendations   Diabetic diet when no longer NPO   Nutrition consult   Monitor Accu-Cheks    Alcohol-induced chronic pancreatitis ([address] )  Assessment & Plan  · Currently without abdominal pain  Essential hypertension  Assessment & Plan  · Blood pressure acceptable  Continue beta-blocker  Patient was previously started on amlodipine and lisinopril which he has not been taking  Gastroesophageal reflux disease with esophagitis  Assessment & Plan  · History of eosinophilic esophagitis  · Continue Pepcid       Hypokalemia  Assessment & Plan  · Repleted on admission, F/u BMP in AM     Portal vein thrombosis  Assessment & Plan  · Previously on coumadin  Hypertriglyceridemia  Assessment & Plan  · Triglyceride levels greater than 800  Now started on atorvastatin  Would also benefit from addition of a fenofibrate  Pharmacologic: Heparin Drip  Mechanical VTE Prophylaxis in Place: Yes    Patient Centered Rounds: I have performed bedside rounds with nursing staff today  Discussions with Specialists or Other Care Team Provider: nursing, case management,     Education and Discussions with Family / Patient: patient     Time Spent for Care: 30 minutes  More than 50% of total time spent on counseling and coordination of care as described above  Current Length of Stay: 0 day(s)    Current Patient Status: Inpatient   Certification Statement: The patient will continue to require additional inpatient hospital stay due to Cardiac catheterization, additional recommendations based on that, monitor blood glucose    Discharge Plan / Estimated Discharge Date:  Not medically stable  Await cardiac catheterization  Code Status: Level 1 - Full Code      Subjective:   Patient offers no acute complaints  Chest pain has resolved  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 and has not really been experiencing any chest pain or shortness of breath until the last 24-48 hours  He states that he was unaware that he was a diabetic  He reports he was told approximately 2 years ago that he was a borderline diabetic and was given metformin but only took a 1 month supply he was given and nothing further  He has had no follow-up since then  Objective:     Vitals:   Temp (24hrs), Av 4 °F (36 9 °C), Min:98 1 °F (36 7 °C), Max:98 7 °F (37 1 °C)    Temp:  [98 1 °F (36 7 °C)-98 7 °F (37 1 °C)] 98 7 °F (37 1 °C)  HR:  [64-89] 70  Resp:  [17-18] 18  BP: (117-186)/() 134/88  SpO2:  [95 %-99 %] 96 %  Body mass index is 27 26 kg/m²       Input and Output Summary (last 24 hours): Intake/Output Summary (Last 24 hours) at 12/18/2019 1004  Last data filed at 12/18/2019 2653  Gross per 24 hour   Intake 110 ml   Output 301 ml   Net -191 ml       Physical Exam:     Physical Exam   Constitutional: He is oriented to person, place, and time  No distress  HENT:   Head: Normocephalic  Poor dentition   Eyes: Conjunctivae are normal    Neck: Normal range of motion  Cardiovascular: Normal rate  Pulmonary/Chest: Breath sounds normal    Abdominal: Bowel sounds are normal    Musculoskeletal: Normal range of motion  He exhibits no edema  Neurological: He is alert and oriented to person, place, and time  Skin: Skin is warm  Psychiatric: He has a normal mood and affect  Nursing note and vitals reviewed        Additional Data:     Labs:    Results from last 7 days   Lab Units 12/18/19  0321   WBC Thousand/uL 7 02   HEMOGLOBIN g/dL 15 9   HEMATOCRIT % 47 8   PLATELETS Thousands/uL 211   NEUTROS PCT % 77*   LYMPHS PCT % 12*   MONOS PCT % 7   EOS PCT % 3     Results from last 7 days   Lab Units 12/18/19  0321   POTASSIUM mmol/L 3 2*   CHLORIDE mmol/L 98*   CO2 mmol/L 28   BUN mg/dL 10   CREATININE mg/dL 1 24   CALCIUM mg/dL 9 5   ALK PHOS U/L 169*   ALT U/L 41   AST U/L 27     Results from last 7 days   Lab Units 12/18/19  0459   INR  0 96         Recent Cultures (last 7 days):           Last 24 Hours Medication List:     Current Facility-Administered Medications:  acetaminophen 650 mg Oral Q6H PRN Michae Baar, DO    aspirin 81 mg Oral Daily David Man Veres, DO    atorvastatin 40 mg Oral After [employer] D Veres, DO    famotidine 20 mg Oral BID PRN Michae Baar, DO    heparin (porcine) 3-20 Units/kg/hr (Order-Specific) Intravenous Titrated Michae Baar, DO Last Rate: 11 8 Units/kg/hr (12/18/19 0426)   heparin (porcine) 2,000 Units Intravenous PRN David Man Veres, DO    heparin (porcine) 4,000 Units Intravenous PRN David Man Veres, DO    insulin glargine 15 Units Subcutaneous HS Alverna Rule Veres, DO    insulin lispro 1-6 Units Subcutaneous TID AC Deacon BROWN Versina, DO    insulin lispro 1-6 Units Subcutaneous HS Alverna Rule Veres, DO    insulin lispro 5 Units Subcutaneous TID With Meals Pema Dye, DO    metoprolol tartrate 25 mg Oral Q12H [address] Pema Dye, DO    nitroglycerin 0 4 mg Sublingual Q5 Min PRN Pema Dye, DO    nitroGLYcerin 5-200 mcg/min Intravenous Titrated Alverna Rule Veres, DO Last Rate: 85 mcg/min (12/18/19 0958)   ondansetron 4 mg Intravenous Q6H PRN Pema Dye, DO    sodium chloride 100 mL/hr Intravenous Continuous Wu Ram MD Last Rate: 100 mL/hr (12/18/19 0916)   ticagrelor 90 mg Oral Q12H [address] Berenice Barbosa MD         Today, Patient Was Seen By: NICOLETTE Arcos    ** Please Note: Dragon 360 Dictation voice to text software may have been used in the creation of this document   **

## 2019-12-18 NOTE — ASSESSMENT & PLAN NOTE
Patient admits to not taking any his blood pressure medication at home  · Start metoprolol b i d   In setting of NSTEMI  · Follow-up cardiology consult  · If blood pressure refractory, will start on nitro drip

## 2019-12-18 NOTE — ASSESSMENT & PLAN NOTE
· Patient admitted with chest pain  Found to have elevated troponin at 0 79, increasing to 2 84  Currently on heparin drip as well as nitro drip for chest pain  · EKG with T-wave inversions V4 to V6  · Cardiology following, plan is for cardiac catheterization today  · Started on aspirin, statin, beta-blocker  Loaded with Brilinta this morning    · Telemetry  · Additional recommendations based on cardiac catheterization

## 2019-12-19 PROBLEM — E78.5 DYSLIPIDEMIA: Status: ACTIVE | Noted: 2019-12-18

## 2019-12-19 LAB
ANION GAP SERPL CALCULATED.3IONS-SCNC: 6 MMOL/L (ref 4–13)
BUN SERPL-MCNC: 6 MG/DL (ref 5–25)
CALCIUM SERPL-MCNC: 9 MG/DL (ref 8.3–10.1)
CHLORIDE SERPL-SCNC: 106 MMOL/L (ref 100–108)
CO2 SERPL-SCNC: 24 MMOL/L (ref 21–32)
CREAT SERPL-MCNC: 0.85 MG/DL (ref 0.6–1.3)
ERYTHROCYTE [DISTWIDTH] IN BLOOD BY AUTOMATED COUNT: 12.4 % (ref 11.6–15.1)
GFR SERPL CREATININE-BSD FRML MDRD: 105 ML/MIN/1.73SQ M
GLUCOSE SERPL-MCNC: 146 MG/DL (ref 65–140)
GLUCOSE SERPL-MCNC: 150 MG/DL (ref 65–140)
GLUCOSE SERPL-MCNC: 187 MG/DL (ref 65–140)
GLUCOSE SERPL-MCNC: 275 MG/DL (ref 65–140)
GLUCOSE SERPL-MCNC: 341 MG/DL (ref 65–140)
HCT VFR BLD AUTO: 41.7 % (ref 36.5–49.3)
HGB BLD-MCNC: 13.7 G/DL (ref 12–17)
MCH RBC QN AUTO: 25.7 PG (ref 26.8–34.3)
MCHC RBC AUTO-ENTMCNC: 32.9 G/DL (ref 31.4–37.4)
MCV RBC AUTO: 78 FL (ref 82–98)
PLATELET # BLD AUTO: 175 THOUSANDS/UL (ref 149–390)
PMV BLD AUTO: 11.1 FL (ref 8.9–12.7)
POTASSIUM SERPL-SCNC: 3.4 MMOL/L (ref 3.5–5.3)
RBC # BLD AUTO: 5.34 MILLION/UL (ref 3.88–5.62)
SODIUM SERPL-SCNC: 136 MMOL/L (ref 136–145)
WBC # BLD AUTO: 6.02 THOUSAND/UL (ref 4.31–10.16)

## 2019-12-19 PROCEDURE — 82948 REAGENT STRIP/BLOOD GLUCOSE: CPT

## 2019-12-19 PROCEDURE — 85027 COMPLETE CBC AUTOMATED: CPT | Performed by: STUDENT IN AN ORGANIZED HEALTH CARE EDUCATION/TRAINING PROGRAM

## 2019-12-19 PROCEDURE — 99255 IP/OBS CONSLTJ NEW/EST HI 80: CPT | Performed by: INTERNAL MEDICINE

## 2019-12-19 PROCEDURE — 99232 SBSQ HOSP IP/OBS MODERATE 35: CPT | Performed by: INTERNAL MEDICINE

## 2019-12-19 PROCEDURE — 80048 BASIC METABOLIC PNL TOTAL CA: CPT | Performed by: STUDENT IN AN ORGANIZED HEALTH CARE EDUCATION/TRAINING PROGRAM

## 2019-12-19 PROCEDURE — 99232 SBSQ HOSP IP/OBS MODERATE 35: CPT | Performed by: NURSE PRACTITIONER

## 2019-12-19 RX ORDER — ATORVASTATIN CALCIUM 80 MG/1
80 TABLET, FILM COATED ORAL
Qty: 30 TABLET | Refills: 0 | Status: SHIPPED | OUTPATIENT
Start: 2019-12-19 | End: 2019-12-22

## 2019-12-19 RX ORDER — ATORVASTATIN CALCIUM 80 MG/1
80 TABLET, FILM COATED ORAL
Status: DISCONTINUED | OUTPATIENT
Start: 2019-12-19 | End: 2019-12-22 | Stop reason: HOSPADM

## 2019-12-19 RX ORDER — METOPROLOL SUCCINATE 25 MG/1
25 TABLET, EXTENDED RELEASE ORAL DAILY
Status: DISCONTINUED | OUTPATIENT
Start: 2019-12-19 | End: 2019-12-22 | Stop reason: HOSPADM

## 2019-12-19 RX ORDER — ASPIRIN 81 MG/1
81 TABLET, CHEWABLE ORAL DAILY
Qty: 30 TABLET | Refills: 0 | Status: SHIPPED | OUTPATIENT
Start: 2019-12-20 | End: 2019-12-22

## 2019-12-19 RX ORDER — NITROGLYCERIN 0.4 MG/1
0.4 TABLET SUBLINGUAL
Qty: 20 TABLET | Refills: 0 | Status: SHIPPED | OUTPATIENT
Start: 2019-12-19 | End: 2019-12-22

## 2019-12-19 RX ORDER — FENOFIBRATE 145 MG/1
145 TABLET, COATED ORAL DAILY
Status: DISCONTINUED | OUTPATIENT
Start: 2019-12-19 | End: 2019-12-22 | Stop reason: HOSPADM

## 2019-12-19 RX ORDER — METOPROLOL SUCCINATE 25 MG/1
25 TABLET, EXTENDED RELEASE ORAL DAILY
Qty: 30 TABLET | Refills: 0 | Status: SHIPPED | OUTPATIENT
Start: 2019-12-20 | End: 2019-12-22

## 2019-12-19 RX ORDER — LISINOPRIL 20 MG/1
20 TABLET ORAL DAILY
Status: DISCONTINUED | OUTPATIENT
Start: 2019-12-19 | End: 2019-12-22 | Stop reason: HOSPADM

## 2019-12-19 RX ORDER — FENOFIBRATE 145 MG/1
145 TABLET, COATED ORAL DAILY
Qty: 30 TABLET | Refills: 0 | Status: SHIPPED | OUTPATIENT
Start: 2019-12-20 | End: 2019-12-22

## 2019-12-19 RX ORDER — INSULIN GLARGINE 100 [IU]/ML
15 INJECTION, SOLUTION SUBCUTANEOUS
Refills: 0 | Status: CANCELLED | OUTPATIENT
Start: 2019-12-19

## 2019-12-19 RX ORDER — LISINOPRIL 20 MG/1
20 TABLET ORAL DAILY
Qty: 30 TABLET | Refills: 0 | Status: SHIPPED | OUTPATIENT
Start: 2019-12-20 | End: 2019-12-22

## 2019-12-19 RX ORDER — POTASSIUM CHLORIDE 20 MEQ/1
40 TABLET, EXTENDED RELEASE ORAL ONCE
Status: COMPLETED | OUTPATIENT
Start: 2019-12-19 | End: 2019-12-19

## 2019-12-19 RX ADMIN — FENOFIBRATE 145 MG: 145 TABLET, FILM COATED ORAL at 10:53

## 2019-12-19 RX ADMIN — ASPIRIN 81 MG 81 MG: 81 TABLET ORAL at 09:17

## 2019-12-19 RX ADMIN — ACETAMINOPHEN 650 MG: 325 TABLET ORAL at 11:07

## 2019-12-19 RX ADMIN — METOPROLOL SUCCINATE 25 MG: 25 TABLET, EXTENDED RELEASE ORAL at 09:20

## 2019-12-19 RX ADMIN — INSULIN LISPRO 5 UNITS: 100 INJECTION, SOLUTION INTRAVENOUS; SUBCUTANEOUS at 09:13

## 2019-12-19 RX ADMIN — INSULIN GLARGINE 15 UNITS: 100 INJECTION, SOLUTION SUBCUTANEOUS at 23:13

## 2019-12-19 RX ADMIN — INSULIN LISPRO 5 UNITS: 100 INJECTION, SOLUTION INTRAVENOUS; SUBCUTANEOUS at 11:09

## 2019-12-19 RX ADMIN — POTASSIUM CHLORIDE 40 MEQ: 1500 TABLET, EXTENDED RELEASE ORAL at 10:33

## 2019-12-19 RX ADMIN — TICAGRELOR 90 MG: 90 TABLET ORAL at 09:16

## 2019-12-19 RX ADMIN — LISINOPRIL 20 MG: 20 TABLET ORAL at 09:20

## 2019-12-19 RX ADMIN — INSULIN LISPRO 4 UNITS: 100 INJECTION, SOLUTION INTRAVENOUS; SUBCUTANEOUS at 23:14

## 2019-12-19 RX ADMIN — TICAGRELOR 90 MG: 90 TABLET ORAL at 23:14

## 2019-12-19 NOTE — PROGRESS NOTES
Called to bedside by nursing staff for right wrist hematoma developing proximal to radial TR band  When I arrived at bedside nursing staff had replaced air into TR band and held pressure over hematoma  On physical examination hematoma was soft and there was no visible oozing from under TR band  Patient was moving arm around and I counseled him on rest as this could exacerbate further bleeding issues  He denies any issues with loss of sensation or numbness/tingling in his hand or arm  Proximal distal pulses were intact at sensation was intact  Vital signs patient was hemodynamically stable  S patient was relatively asymptomatic at this time will have nursing staff elevate wrist above elbow and slow rate of deflation of TR band  Nursing staff will remove 1 mL air every 30 minutes with every 30 minutes neurovascular checks as well as monitoring for any growth hematoma  Will continue monitor patient clinically at this time

## 2019-12-19 NOTE — NURSING NOTE
Pt arrived from the cath lab at 1900, site looked clean, intact, and dry  Air was let out of the TR band every 15 minutes per protocol  At 2000, pt started to develop a hematoma  Raul Carr, RN assisted at bedside while I paged Gail Auguste called back and ordered for the 3ml that were taken out to be replaced and that he would be up to assess patient  3ml were replaced and Raul Carr remained at bedside and assisted with management of the hematoma  Pt was assessed by Gail Auguste and he advised to take 1ml out every half hour and to do neurovascular checks  Will continue to monitor

## 2019-12-19 NOTE — CONSULTS
Consultation - eBcca Chandler 38 y o  male MRN: [id number]    Unit/Bed#: CW2 218-01 Encounter: [id number]      Assessment/Plan     Assessment: This is a 38y o -year-old male with diabetes with hyperglycemia, coronary artery disease status post stent placement and non ST elevation myocardial infarction  Plan:  1  Type 2 diabetes with hyperglycemia-his diabetes is under horrible control based on hemoglobin A1c  He is having hyperglycemia after meals  Increase mealtime insulin to Humalog 10 units  Continue correctional insulin  Check urine for microalbumin  At discharge, the patient should be given a prescription for jardiance 25 mg per day in order to decrease the cardiovascular mortality by 40%  2  Coronary disease status post stent placement-management per Cardiology  3  Non ST elevation myocardial infarction-this is being monitored  He is being followed by Cardiology  4  Okay to discharge home from endocrine standpoint as long as he has been taught how to do insulin injection and has been seen by Nutrition Services  CC: Diabetes Consult    History of Present Illness     HPI: Becca Chandler is a 38y o  year old male with new onset type 2 diabetes  He is not on any diabetes related medications at home  He admits to polyuria, polydipsia, nocturia for a few months but denies any blurry vision  He denies neuropathy, nephropathy, retinopathy, stroke and claudication but does admit to heart attack  He denies any hypoglycemia  Inpatient consult to Endocrinology  Consult performed by: Sylvester Arnold MD  Consult ordered by: NICOLETTE Redmond          Review of Systems   Constitutional: Negative for chills and fever  Respiratory: Negative for shortness of breath  Cardiovascular: Negative for chest pain  Gastrointestinal: Negative for constipation, diarrhea, nausea and vomiting  All other systems reviewed and are negative        Historical Information   Past Medical History: Diagnosis Date    Diabetes mellitus ([facility] [address] [facility] )     pre-diabetic     Hypertension     Pancreatitis     Portal vein thrombosis      Past Surgical History:   Procedure Laterality Date    ABDOMINAL SURGERY      CHOLECYSTECTOMY      [place] EDG US EXAM SURGICAL ALTER STOM DUODENUM/JEJUNUM N/A 7/24/2017    Procedure: LINEAR ENDOSCOPIC U/S WITH AXIOS STENT;  Surgeon: Kt Andrade MD;  [facility]; Service: Gastroenterology    [place] EDG US EXAM SURGICAL ALTER STOM DUODENUM/JEJUNUM N/A 9/14/2017    Procedure: LINEAR ENDOSCOPIC U/S POSSIBLE AXIOS;  Surgeon: Kt Andrade MD;  [facility]; Service: Gastroenterology    WISDOM TOOTH EXTRACTION      20 years ago     Social History   Social History     Substance and Sexual Activity   Alcohol Use Yes    Comment: socially -rarely     Social History     Substance and Sexual Activity   Drug Use No     Social History     Tobacco Use   Smoking Status Never Smoker   Smokeless Tobacco Never Used     Family History: History reviewed  No pertinent family history      Meds/Allergies   Current Facility-Administered Medications   Medication Dose Route Frequency Provider Last Rate Last Dose    acetaminophen (TYLENOL) tablet 650 mg  650 mg Oral Q6H PRN Bernardo Metro, DO   650 mg at 12/19/19 1107    aspirin chewable tablet 81 mg  81 mg Oral Daily Ely Reno DO   81 mg at 12/19/19 3242    atorvastatin (LIPITOR) tablet 80 mg  80 mg Oral After Dinner Wu Barbour MD        famotidine (PEPCID) tablet 20 mg  20 mg Oral BID PRN Bernardo ro, DO        fenofibrate (TRICOR) tablet 145 mg  145 mg Oral Daily Wu Barbour MD   145 mg at 12/19/19 1053    insulin glargine (LANTUS) subcutaneous injection 15 Units 0 15 mL  15 Units Subcutaneous HS Bernardo Metro, DO   15 Units at 12/18/19 2207    insulin lispro (HumaLOG) 100 units/mL subcutaneous injection 1-6 Units  1-6 Units Subcutaneous TID AC Ely Reno DO   5 Units at 12/19/19 1109    insulin lispro (HumaLOG) 100 units/mL subcutaneous injection 1-6 Units  1-6 Units Subcutaneous HS Flaquita Diaz DO   4 Units at 12/18/19 2208    insulin lispro (HumaLOG) 100 units/mL subcutaneous injection 5 Units  5 Units Subcutaneous TID With Meals Flaquita Diaz DO   5 Units at 12/19/19 1109    lisinopril (ZESTRIL) tablet 20 mg  20 mg Oral Daily Wu Perkins MD   20 mg at 12/19/19 0920    metoprolol succinate (TOPROL-XL) 24 hr tablet 25 mg  25 mg Oral Daily Wu Perkins MD   25 mg at 12/19/19 0920    nitroglycerin (NITROSTAT) SL tablet 0 4 mg  0 4 mg Sublingual Q5 Min PRN Vanda Reno DO   0 4 mg at 12/18/19 0541    ondansetron (ZOFRAN) injection 4 mg  4 mg Intravenous Q6H PRN Flaquita Diaz DO        sodium chloride 0 9 % infusion  100 mL/hr Intravenous Continuous Wu Perkins MD   Stopped at 12/19/19 0700    ticagrelor (BRILINTA) tablet 90 mg  90 mg Oral Q12H 1518 Madonna Vo MD   90 mg at 12/19/19 0916     No Known Allergies    Objective   Vitals: Blood pressure 153/99, pulse 87, temperature 98 3 °F (36 8 °C), resp  rate 16, height 5' 10" (1 778 m), weight 86 2 kg (190 lb), SpO2 96 %  Intake/Output Summary (Last 24 hours) at 12/19/2019 1420  Last data filed at 12/19/2019 1201  Gross per 24 hour   Intake    Output 1350 ml   Net -1350 ml     Invasive Devices     Peripheral Intravenous Line            Peripheral IV 12/18/19 Left Antecubital 1 day                Physical Exam   Constitutional: He is oriented to person, place, and time  He appears well-developed and well-nourished  No distress  HENT:   Head: Normocephalic and atraumatic  Mouth/Throat: Oropharynx is clear and moist and mucous membranes are normal  No oropharyngeal exudate  Eyes: Conjunctivae, EOM and lids are normal  Right eye exhibits no discharge  Left eye exhibits no discharge  No scleral icterus  Neck: Neck supple  No thyromegaly present     Cardiovascular: Normal rate, regular rhythm and normal heart sounds  Exam reveals no gallop and no friction rub  No murmur heard  Pulmonary/Chest: Effort normal and breath sounds normal  No respiratory distress  He has no wheezes  Abdominal: Soft  Bowel sounds are normal  He exhibits no distension  There is no tenderness  Musculoskeletal: Normal range of motion  He exhibits no edema, tenderness or deformity  Lymphadenopathy:        Head (right side): No occipital adenopathy present  Head (left side): No occipital adenopathy present  Right: No supraclavicular adenopathy present  Left: No supraclavicular adenopathy present  Neurological: He is alert and oriented to person, place, and time  No cranial nerve deficit  Coordination normal    Skin: Skin is warm and intact  No rash noted  He is not diaphoretic  No erythema  Psychiatric: He has a normal mood and affect  His behavior is normal    Vitals reviewed  The history was obtained from the review of the chart, patient      Lab Results:   Results from last 7 days   Lab Units 12/18/19  0321   HEMOGLOBIN A1C % 13 3*     Lab Results   Component Value Date    WBC 6 02 12/19/2019    HGB 13 7 12/19/2019    HCT 41 7 12/19/2019    MCV 78 (L) 12/19/2019     12/19/2019     Lab Results   Component Value Date/Time    BUN 6 12/19/2019 05:27 AM    BUN 10 06/26/2015 09:06 PM     06/26/2015 09:06 PM    K 3 4 (L) 12/19/2019 05:27 AM    K 3 6 06/26/2015 09:06 PM     12/19/2019 05:27 AM     06/26/2015 09:06 PM    CO2 24 12/19/2019 05:27 AM    CO2 30 06/26/2015 09:06 PM    CREATININE 0 85 12/19/2019 05:27 AM    CREATININE 0 62 06/26/2015 09:06 PM    AST 27 12/18/2019 03:21 AM    AST 15 06/26/2015 09:06 PM    ALT 41 12/18/2019 03:21 AM    ALT 24 06/26/2015 09:06 PM    ALB 4 4 12/18/2019 03:21 AM    ALB 3 5 06/26/2015 09:06 PM     Recent Labs     12/18/19  0321   HDL 37*   TRIG 841*     No results found for: Cong Xavier  POC Glucose   Date Value   12/19/2019 341 mg/dl (H)   12/19/2019 146 mg/dl (H)   12/18/2019 309 mg/dl (H)   12/18/2019 175 mg/dl (H)   12/18/2019 222 mg/dl (H)   12/18/2019 263 mg/dl (H)   07/30/2017 211 mg/dl (H)   07/30/2017 143 mg/dl (H)   07/29/2017 175 mg/dl (H)   07/29/2017 144 mg/dl (H)   04/10/2015 108 mg/dL       Imaging Studies: I have personally reviewed pertinent reports  Portions of the record may have been created with voice recognition software

## 2019-12-19 NOTE — SOCIAL WORK
CM was notified that Brilinta was sent to [facility] for cost check  CM spoke with Maria E Granda at [facility] who reported that Brilinta costs $173 50/month after coupon  CM updated SLIM who reported that pt will also need new insulin, glucometer, and teaching  SLIM stated that plan for dc will be tomorrow  CM visited pt's room to discuss cost of Brilinta and pt was soundly sleeping  CM unable to wake pt  CM reported to nurse who stated that pt didn't sleep well last night

## 2019-12-19 NOTE — PROGRESS NOTES
Consultation - Cardiology   Julia Castillo 38 y o  male MRN: [id number]  Unit/Bed#: CW2 218-01 Encounter: [id number]      Assessment:  Principal Problem:    NSTEMI (non-ST elevated myocardial infarction) [facility])  Active Problems:    Alcohol-induced chronic pancreatitis ([address] )    Essential hypertension    Hypokalemia    Gastroesophageal reflux disease with esophagitis    Type 2 diabetes mellitus without complication, without long-term current use of insulin ([facility])    Portal vein thrombosis    Hypertriglyceridemia    Assessment and plan    NSTEMI 1    - Cardiac catheterization revealed 99% mid LAD disease, 50% OM1  A drug-eluting stent was placed in the mid LAD  - echo- LVEF 45%, dyskinesis of the mid to distal anteroseptal wall, hypokinesis of the mid anteroseptal, mid inferoseptal, apical inferior, apical septal and apical walls normal RV size and function   -Discussed with him the importance of medication compliance going forward  - Continue aspirin 81, Brilinta 90 b i d  , switch metoprolol to succinate 25 daily, increase atorvastatin to 80 HS  - Start lisinopril 20 mg daily, start fenofibrate 145 daily  - case management consult for Brilinta  - Right wrist hematoma stable  Good radial Pulse    Hypertension:  Started metoprolol succinate, lisinopril  Adjust therapy to reach goal < 130/80 mmHg    Hyperlipidemia:  Lipid panel revealed total cholesterol 331, triglyceride 841, HDL 37,   Started on atorvastatin 40 HS, given moderate-high TG levels, may benefit from addition of fenofibrate  Start fenofibrate 145 daily    Diabetes:  Untreated  HB A1c 13 3  Started on insulin    History of portal vein thrombosis:  Previously took warfarin  Chronic pancreatitis with pancreatic pseudocyst, J tube In the past   History of cholecystectomy    Eosinophilic esophagitis        Subjective:  Patient seen bedside  No acute events overnight    No chest pain, shortness of breath, orthopnea PND                     Consults    Review of Systems:  Review of Systems    As per HPI    Historical Information   Past Medical History:   Diagnosis Date    Diabetes mellitus ([address] )     pre-diabetic     Hypertension     Pancreatitis     Portal vein thrombosis      Past Surgical History:   Procedure Laterality Date    ABDOMINAL SURGERY      CHOLECYSTECTOMY      [place] EDG US EXAM SURGICAL ALTER STOM DUODENUM/JEJUNUM N/A 7/24/2017    Procedure: LINEAR ENDOSCOPIC U/S WITH AXIOS STENT;  Surgeon: Shobha Xie MD;  [facility]; Service: Gastroenterology    [place] EDG US EXAM SURGICAL ALTER STOM DUODENUM/JEJUNUM N/A 9/14/2017    Procedure: LINEAR ENDOSCOPIC U/S POSSIBLE AXIOS;  Surgeon: Shobha Xie MD;  [facility]; Service: Gastroenterology    WISDOM TOOTH EXTRACTION      20 years ago     Social History     Substance and Sexual Activity   Alcohol Use Yes    Comment: socially -rarely     Social History     Substance and Sexual Activity   Drug Use No     Social History     Tobacco Use   Smoking Status Never Smoker   Smokeless Tobacco Never Used     Family History: non-contributory    Meds/Allergies   PTA meds:   Prior to Admission Medications   Prescriptions Last Dose Informant Patient Reported? Taking? Multiple Vitamins-Minerals (MULTIVITAMIN WITH MINERALS) tablet   Yes Yes   Sig: Take 1 tablet by mouth daily   Omega-3 Fatty Acids (FISH OIL) 1,000 mg   Yes Yes   Sig: Take 1,000 mg by mouth daily      Facility-Administered Medications: None     No Known Allergies    Objective   Vitals: Blood pressure 137/88, pulse 87, temperature (!) 100 6 °F (38 1 °C), resp  rate 18, height 5' 10" (1 778 m), weight 86 2 kg (190 lb), SpO2 95 %  , Body mass index is 27 26 kg/m² ,   Orthostatic Blood Pressures      Most Recent Value   Blood Pressure  137/88 filed at 12/19/2019 0310   Patient Position - Orthostatic VS  Lying filed at 12/18/2019 0740            Intake/Output Summary (Last 24 hours) at 12/19/2019 2860  Last data filed at 12/19/2019 0442  Gross per 24 hour   Intake    Output 1675 ml   Net -1675 ml       Invasive Devices     Peripheral Intravenous Line            Peripheral IV 12/18/19 Left Antecubital 1 day              Physical Exam    Gen: No acute distress  HEENT: anicteric, mucous membranes moist  Neck: supple, no jugular venous distention, or carotid bruit  Heart: regular, normal s1 and s2, no murmur/rub or gallop  Lungs :clear to auscultation bilaterally, no rales/rhonchi or wheeze  Abdomen: soft nontender, normoactive bowel sounds, no organomegaly  Ext: no edema    Lab Results:     Lab Results   Component Value Date    TROPONINI 6 45 (H) 12/18/2019    TROPONINI 2 84 (H) 12/18/2019    TROPONINI 0 79 (H) 12/18/2019       Lab Results   Component Value Date    GLUCOSE 100 06/26/2015    CALCIUM 9 0 12/19/2019     06/26/2015    K 3 4 (L) 12/19/2019    CO2 24 12/19/2019     12/19/2019    BUN 6 12/19/2019    CREATININE 0 85 12/19/2019       Lab Results   Component Value Date    WBC 6 02 12/19/2019    HGB 13 7 12/19/2019    HCT 41 7 12/19/2019    MCV 78 (L) 12/19/2019     12/19/2019       Lab Results   Component Value Date    CHOL 186 12/15/2014     Lab Results   Component Value Date    HDL 37 (L) 12/18/2019    HDL 46 12/15/2014     Lab Results   Component Value Date    LDLCALC  12/18/2019      Comment:      Calculated LDL invalid, triglycerides >400 mg/dl    LDLCALC 114 (H) 12/15/2014     Lab Results   Component Value Date    TRIG 841 (H) 12/18/2019    TRIG 129 12/15/2014       Lab Results   Component Value Date    ALT 41 12/18/2019    AST 27 12/18/2019       Results from last 7 days   Lab Units 12/18/19  0459   INR  0 96         Imaging: I have personally reviewed pertinent reports

## 2019-12-19 NOTE — ASSESSMENT & PLAN NOTE
Lab Results   Component Value Date    HGBA1C 13 3 (H) 12/18/2019       Recent Labs     12/18/19  1045 12/18/19  2101 12/19/19  0625 12/19/19  1036   POCGLU 175* 309* 146* 341*     Uncontrolled as evidenced by A1c 13 3  Was told that he was a borderline diabetic approximately 2 years ago and took a 1 month supply of metformin  Has had no follow-up since  Will need insulin on discharge, this was discussed with patient   Started on Lantus 15 units qHS and Humalog 10 units with meals  Continue sliding scale  Julieann Frankel Endocrinology following   Will need above insulin + Jardiance on discharge with close follow-up    Nutrition consult

## 2019-12-19 NOTE — PLAN OF CARE
Problem: Potential for Falls  Goal: Patient will remain free of falls  Description  INTERVENTIONS:  - Assess patient frequently for physical needs  -  Identify cognitive and physical deficits and behaviors that affect risk of falls    -  [place] fall precautions as indicated by assessment   - Educate patient/family on patient safety including physical limitations  - Instruct patient to call for assistance with activity based on assessment  - Modify environment to reduce risk of injury  - Consider OT/PT consult to assist with strengthening/mobility  Outcome: Progressing     Problem: PAIN - ADULT  Goal: Verbalizes/displays adequate comfort level or baseline comfort level  Description  Interventions:  - Encourage patient to monitor pain and request assistance  - Assess pain using appropriate pain scale  - Administer analgesics based on type and severity of pain and evaluate response  - Implement non-pharmacological measures as appropriate and evaluate response  - Consider cultural and social influences on pain and pain management  - Notify physician/advanced practitioner if interventions unsuccessful or patient reports new pain  Outcome: Progressing     Problem: INFECTION - ADULT  Goal: Absence or prevention of progression during hospitalization  Description  INTERVENTIONS:  - Assess and monitor for signs and symptoms of infection  - Monitor lab/diagnostic results  - Monitor all insertion sites, i e  indwelling lines, tubes, and drains  - Monitor endotracheal if appropriate and nasal secretions for changes in amount and color  - [place] appropriate cooling/warming therapies per order  - Administer medications as ordered  - Instruct and encourage patient and family to use good hand hygiene technique  - Identify and instruct in appropriate isolation precautions for identified infection/condition  Outcome: Progressing  Goal: Absence of fever/infection during neutropenic period  Description  INTERVENTIONS:  - Monitor WBC    Outcome: Progressing     Problem: SAFETY ADULT  Goal: Maintain or return to baseline ADL function  Description  INTERVENTIONS:  -  Assess patient's ability to carry out ADLs; assess patient's baseline for ADL function and identify physical deficits which impact ability to perform ADLs (bathing, care of mouth/teeth, toileting, grooming, dressing, etc )  - Assess/evaluate cause of self-care deficits   - Assess range of motion  - Assess patient's mobility; develop plan if impaired  - Assess patient's need for assistive devices and provide as appropriate  - Encourage maximum independence but intervene and supervise when necessary  - Involve family in performance of ADLs  - Assess for home care needs following discharge   - Consider OT consult to assist with ADL evaluation and planning for discharge  - Provide patient education as appropriate  Outcome: Progressing  Goal: Maintain or return mobility status to optimal level  Description  INTERVENTIONS:  - Assess patient's baseline mobility status (ambulation, transfers, stairs, etc )    - Identify cognitive and physical deficits and behaviors that affect mobility  - Identify mobility aids required to assist with transfers and/or ambulation (gait belt, sit-to-stand, lift, walker, cane, etc )  - [place] fall precautions as indicated by assessment  - Record patient progress and toleration of activity level on Mobility SBAR; progress patient to next Phase/Stage  - Instruct patient to call for assistance with activity based on assessment  - Consider rehabilitation consult to assist with strengthening/weightbearing, etc   Outcome: Progressing     Problem: DISCHARGE PLANNING  Goal: Discharge to home or other facility with appropriate resources  Description  INTERVENTIONS:  - Identify barriers to discharge w/patient and caregiver  - Arrange for needed discharge resources and transportation as appropriate  - Identify discharge learning needs (meds, wound care, etc )  - Arrange for interpretive services to assist at discharge as needed  - Refer to Case Management Department for coordinating discharge planning if the patient needs post-hospital services based on physician/advanced practitioner order or complex needs related to functional status, cognitive ability, or social support system  Outcome: Progressing     Problem: Knowledge Deficit  Goal: Patient/family/caregiver demonstrates understanding of disease process, treatment plan, medications, and discharge instructions  Description  Complete learning assessment and assess knowledge base    Interventions:  - Provide teaching at level of understanding  - Provide teaching via preferred learning methods  Outcome: Progressing

## 2019-12-19 NOTE — UTILIZATION REVIEW
Initial Clinical Review    Admission: Date/Time/Statement: Inpatient Admission Orders (From admission, onward)     Ordered        12/18/19 0415  Inpatient Admission (expected length of stay for this patient Order details is greater than two midnights)  Once                   Orders Placed This Encounter   Procedures    Inpatient Admission (expected length of stay for this patient Order details is greater than two midnights)     Standing Status:   Standing     Number of Occurrences:   1     Order Specific Question:   Admitting Physician     Answer:   Gerardo Caceres [[id number]]     Order Specific Question:   Level of Care     Answer:   Med Surg [[id number]]     Order Specific Question:   Estimated length of stay     Answer:   More than 2 Midnights     Order Specific Question:   Certification     Answer:   I certify that inpatient services are medically necessary for this patient for a duration of greater than two midnights  See H&P and MD Progress Notes for additional information about the patient's course of treatment  ED Arrival Information     Expected Arrival Acuity Means of Arrival Escorted By Service Admission Type    - 12/18/2019 03:07 Urgent Walk-In Self General Medicine Urgent    Arrival Complaint    Chest pain; high BP        Chief Complaint   Patient presents with    Chest Pain     Pt states that at 7:30pm he developed chest pain while at work, check BP with was high coworkers suggested coming to the ED  Upon arrival reports a 9/10 "squeezing" mid sternal/epigastric pain  Assessment/Plan:    38year old male presents to ed from home for evaluation and treatment of chest pain which began spontaneously at work today  The pain was intermittent and worse when lifting heavy objects  He was seen by medical personnel and found to be hypertensive  From there he reported to the ed  On arrival chest pain is improving  Blood pressure sbp>150 and dbp > 100,  Troponin 0 79   EKG shows non specific T wave abnormality  He reports experiencing intermittent bilateral arm tingling  PMHX DM, HTN, pancreatitis  Treated in ed with heparin gtt and iv  9% ns 1L bolus  Admit to inpatient medical surgical for NSTEMI  Plan to consult cardiology,  Continue heparin gtt,  Aspirin, statin, start metoprolol,  Repeat ekg, obtain echo, monitor on telemetry  Addendum  Patient experienced recurrent chest pain and was started on iv nitroglycerine gtt  Troponin alex to 2 8  EKG show anterior t wave abnormality  Plan for cardiac cath with drug eluting stent 12-18      ED Triage Vitals [12/18/19 0311]   Temperature Pulse Respirations Blood Pressure SpO2   98 1 °F (36 7 °C) 83 17 (!) 186/130 99 %      Oral            Pain Score       9       12/18/19 86 2 kg (190 lb)     Additional Vital Signs:    12/18/19 07:54:28    66    151/101Abnormal   118 map  97 %  None (Room air)   12/18/19 07:47:40    68    149/102Abnormal   118  96 %     12/18/19 07:40:57  98 7 °F (37 1 °C)  72  18  150/101Abnormal      117  96 %  None (Room air)   12/18/19 07:03:01  98 3 °F (36 8 °C)  65  18  151/100  117  97 %     12/18/19 06:02:46    65    156/103Abnormal   121  97 %     12/18/19 0554              None (Room air)   12/18/19 05:45:40    85    160/107Abnormal   125  95 %     12/18/19 05:36:02    89    165/114Abnormal   131  95 %  None (Room air)   12/18/19 05:13:27  98 3 °F (36 8 °C)  71  18  166/117Abnormal   133  97 %               Pertinent Labs/Diagnostic Test Results:       SUMMARY     CORONARY CIRCULATION:  Mid LAD: There was a discrete 99 % stenosis  There was GM grade 2 flow through the vessel (partial perfusion)  This lesion is a likely culprit for the patient's recent myocardial infarction  An intervention was performed  1st obtuse marginal: There was a discrete 50 % stenosis   It appears amenable to percutaneous intervention      1ST LESION INTERVENTIONS:  A successful balloon angioplasty with stent procedure was performed on the 99 % lesion in the mid LAD  Following intervention there was an excellent angiographic appearance with a 0 % residual stenosis  A Orsiro Rx 4 0 x 18mm drug-eluting stent was placed across the lesion and deployed at a maximum inflation pressure of 9 aris      INDICATIONS:  --  Possible CAD: myocardial infarction without ST elevation (NSTEMI)      PROCEDURES PERFORMED     --  Left coronary angiography  --  Right coronary angiography  --  Inpatient  --  Coronary Catheterization (w/o LHC)  --  Mod Sedation Same Physician Initial 15min  --  Mod Sedation Same Physician Add 15min  --  Coronary Drug Eluting Stent w/PTCA  --  Intervention on mid LAD: balloon angioplasty, stent  INTERVENTIONS:  --  Coronary Drug Eluting Stent w/PTCA  Collection Time Result Time Vent R Atrial R SC Int  QRSD Int  QT Int  QTC Int  P Axis QRS Axis T Wave Ax    12/18/19 03:23:50 12/18/19 08:57:48 80 80 184 88 350 403 62 24 14     Normal sinus rhythm  Nonspecific T wave abnormality  Abnormal ECG  When compared with ECG of 03-OCT-2017 00:29,  No significant change was found    XR chest 2 views   Final  (12/18 0505)      No acute cardiopulmonary disease               Results from last 7 days   Lab Units 12/19/19  0527 12/18/19  0321   WBC Thousand/uL 6 02 7 02   HEMOGLOBIN g/dL 13 7 15 9   HEMATOCRIT % 41 7 47 8   PLATELETS Thousands/uL 175 211   NEUTROS ABS Thousands/µL  --  5 38         Results from last 7 days   Lab Units 12/19/19  0527 12/18/19  0321   SODIUM mmol/L 136 133*   POTASSIUM mmol/L 3 4* 3 2*   CHLORIDE mmol/L 106 98*   CO2 mmol/L 24 28   ANION GAP mmol/L 6 7   BUN mg/dL 6 10   CREATININE mg/dL 0 85 1 24   EGFR ml/min/1 73sq m 105 70   CALCIUM mg/dL 9 0 9 5     Results from last 7 days   Lab Units 12/18/19  0321   AST U/L 27   ALT U/L 41   ALK PHOS U/L 169*   TOTAL PROTEIN g/dL 8 1   ALBUMIN g/dL 4 4   TOTAL BILIRUBIN mg/dL 0 43     Results from last 7 days   Lab Units 12/19/19  1036 12/19/19  7333 12/18/19  2101 12/18/19  1045 12/18/19  0644 12/18/19  0529   POC GLUCOSE mg/dl 341* 146* 309* 175* 222* 263*     Results from last 7 days   Lab Units 12/19/19  0527 12/18/19  0321   GLUCOSE RANDOM mg/dL 150* 372*         Results from last 7 days   Lab Units 12/18/19  0321   HEMOGLOBIN A1C % 13 3*   EAG mg/dl 335     Results from last 7 days   Lab Units 12/18/19  0959 12/18/19  0645 12/18/19  0321   TROPONIN I ng/mL 6 45* 2 84* 0 79*         Results from last 7 days   Lab Units 12/18/19  0959 12/18/19  0459   PROTIME seconds  --  12 4   INR   --  0 96   PTT seconds 48* 28     ED Treatment:   Medication Administration from 12/18/2019 0307 to 12/18/2019 4269       Date/Time Order Dose Route Action     12/18/2019 0425 sodium chloride 0 9 % bolus 1,000 mL 1,000 mL Intravenous New Bag     12/18/2019 0426 heparin (porcine) injection 4,000 Units 4,000 Units Intravenous Given     12/18/2019 0425 heparin (porcine) 25,000 units in 250 mL infusion (premix) 11 8 Units/kg/hr Intravenous New Bag        Past Medical History:   Diagnosis    [facility] )    pre-diabetic     Hypertension    Pancreatitis    Portal vein thrombosis     Present on Admission:   [facility]STEMI [facility]non-ST elevated myocardial infarction) [facility]   Essential hypertension   [facility]-induced chronic pancreatitis ([facility] )   Type 2 diabetes mellitus without complication, without long-term current use of insulin ([facility]   Gastroesophageal reflux disease with esophagitis   Hypokalemia      Admitting Diagnosis:     hest pain [R07 9]  Elevated troponin [R79 89]  NSTEMI (non-ST elevated myocardial infarction) ([facility] ) [I21 4]    Age/Sex: 38 y o  male     Admission Orders:      Scheduled Medications:      nitroGLYcerin (TRIDIL) 50 mg in 250 mL infusion (premix)   Rate: 1 5-60 mL/hr Dose: 5-200 mcg/min  Freq: Titrated Route: IV  Last Dose: Stopped (12/18/19 1831)  Start: 12/18/19 0700 End: 12/18/19 1833    heparin (porcine) 25,000 units in 250 mL infusion (premix) Rate: 2 6-17 mL/hr Dose: 3-20 Units/kg/hr  Weight Dosing Info: 85 kg (Order-Specific)  Freq: Titrated Route: IV  Last Dose: Stopped (12/18/19 1750)  Start: 12/18/19 0415 End: 12/18/19 1831    Medications:  aspirin 81 mg Oral Daily   atorvastatin 80 mg Oral After Dinner   fenofibrate 145 mg Oral Daily   insulin glargine 15 Units Subcutaneous HS   insulin lispro 1-6 Units Subcutaneous TID AC   insulin lispro 1-6 Units Subcutaneous HS   insulin lispro 10 Units Subcutaneous TID With Meals   lisinopril 20 mg Oral Daily   metoprolol succinate 25 mg Oral Daily   ticagrelor 90 mg Oral Q12H [address]     Continuous IV Infusions:    sodium chloride 100 mL/hr Intravenous Continuous     PRN Meds:    acetaminophen 650 mg Oral Q6H PRN   famotidine 20 mg Oral BID PRN   nitroglycerin 0 4 mg Sublingual Q5 Min PRN   ondansetron 4 mg Intravenous Q6H PRN       IP CONSULT TO CARDIOLOGY  IP CONSULT TO CASE MANAGEMENT  IP CONSULT TO ENDOCRINOLOGY  IP CONSULT TO NUTRITION SERVICES  IP CONSULT TO CASE MANAGEMENT    Network Utilization Review Department  [email or web address]  ATTENTION: Please call with any questions or concerns to [phone] and carefully listen to the prompts so that you are directed to the right person  All voicemails are confidential   Amparo Marin all requests for admission clinical reviews, approved or denied determinations and any other requests to dedicated fax number below belonging to the campus where the patient is receiving treatment   List of dedicated fax numbers for the Facilities:  FACILITY NAME UR FAX NUMBER   ADMISSION DENIALS (Administrative/Medical Necessity) [phone]   [address] (Maternity/NICU/Pediatrics) [phone]   [place] [phone]   Lu [place] [phone]   Delmis Evens [phone]   [address] [phone]     [facility] [phone] [address] [facility]  [phone]   [address] [place] [address] [facility] [phone]

## 2019-12-19 NOTE — ASSESSMENT & PLAN NOTE
· Patient admitted with chest pain  Found to have elevated troponin levels  · S/p cardiac cath s/p MARY to LAD on 12/18/19  · Echo- LVEF 45%, dyskinesis of the mid to distal anteroseptal wall, hypokinesis of the mid anteroseptal, mid inferoseptal, apical inferior, apical septal and apical walls normal RV size and function  · Start aspirin 81, Brilinta 90 BID, Lisinopril 20 mg daily, Fenobibrate 145 mg daily, switched metoprolol to succinate 25 daily, increased atorvastatin to 80 HS  · Start Jardiance on discharge per cards and endo   · Case management consult for Brilinta  · [facility] cost $173/month  · Will check patient's pharmacy, [facility]  · If cost is too expensive, per Dr Edwrad Carr, ok to load with Plavix 300 mg po x1 followed by 75 mg daily in place of Brilinta   · Right wrist hematoma stable    Good radial Pulse

## 2019-12-19 NOTE — PROGRESS NOTES
Progress Note - Chevy Weinstein 1975, 38 y o  male MRN: [id number]    Unit/Bed#: CW2 218-01 Encounter: [id number]    Primary Care Provider: Shellie Enrique DO   Date and time admitted to hospital: 12/18/2019  3:09 AM        * NSTEMI (non-ST elevated myocardial infarction) [facility])  Assessment & Plan  · Patient admitted with chest pain  Found to have elevated troponin levels  · S/p cardiac cath s/p MARY to LAD on 12/18/19  · Echo- LVEF 45%, dyskinesis of the mid to distal anteroseptal wall, hypokinesis of the mid anteroseptal, mid inferoseptal, apical inferior, apical septal and apical walls normal RV size and function  · Start aspirin 81, Brilinta 90 BID, Lisinopril 20 mg daily, Fenobibrate 145 mg daily, switched metoprolol to succinate 25 daily, increased atorvastatin to 80 HS  · Start Jardiance on discharge per cards and endo   · Case management consult for Brilinta  · [facility] cost $173/month  · Will check patient's pharmacy, [facility]  · If cost is too expensive, per Dr Joelle Manrique, ok to load with Plavix 300 mg po x1 followed by 75 mg daily in place of Brilinta   · Right wrist hematoma stable  Good radial Pulse    Type 2 diabetes mellitus without complication, without long-term current use of insulin [facility])  Assessment & Plan  Lab Results   Component Value Date    HGBA1C 13 3 (H) 12/18/2019       Recent Labs     12/18/19  1045 12/18/19  2101 12/19/19  0625 12/19/19  1036   POCGLU 175* 309* 146* 341*     Uncontrolled as evidenced by A1c 13 3  Was told that he was a borderline diabetic approximately 2 years ago and took a 1 month supply of metformin  Has had no follow-up since  Will need insulin on discharge, this was discussed with patient   Started on Lantus 15 units qHS and Humalog 10 units with meals  Continue sliding scale  Rigo Drop Endocrinology following  Will need above insulin + Jardiance on discharge with close follow-up    Nutrition consult   Sent Rx for lantus pen, humalog pen, and jardiance   Will need glucometer and supplies  Please follow-up w3ith CM regarding cost of medications  Dyslipidemia  Assessment & Plan  · Triglyceride levels greater than 800  Now started on atorvastatin and fenofibrate  Essential hypertension  Assessment & Plan  · Blood pressure acceptable  Started Metoprolol succinate and lisinopril  Monitor  Goal /80     Portal vein thrombosis  Assessment & Plan  · H/o portal vein thrombosis previously on coumadin  Gastroesophageal reflux disease with esophagitis  Assessment & Plan  · History of eosinophilic esophagitis  · Continue Pepcid  Hypokalemia  Assessment & Plan  · Repleted on admission, F/u BMP in AM     Alcohol-induced chronic pancreatitis ([facility] )  Assessment & Plan  · Currently without abdominal pain  VTE Pharmacologic Prophylaxis:   Pharmacologic: on DAPT  Mechanical VTE Prophylaxis in Place: Yes    Patient Centered Rounds: I have performed bedside rounds with nursing staff today  Discussions with Specialists or Other Care Team Provider: Nursing, CM, cardiology     Education and Discussions with Family / Patient: I have answered all questions to the best of my ability  Time Spent for Care: 20 minutes  More than 50% of total time spent on counseling and coordination of care as described above  Current Length of Stay: 1 day(s)    Current Patient Status: Inpatient   Certification Statement: The patient will continue to require additional inpatient hospital stay due to NSTEMI need for Rx cost and DM/ insulin education    Discharge Plan: Discharge tomorrow  CM assisting with costs of medications as patient is going home on several new medications  Code Status: Level 1 - Full Code      Subjective:   Resting comfortably  Feels tired  No further right wrist discomfort  Denies HA, dizziness, CP, SOB      Objective:     Vitals:   Temp (24hrs), Av 1 °F (37 3 °C), Min:98 3 °F (36 8 °C), Max:100 6 °F (38 1 °C)    Temp:  [98 3 °F (36 8 °C)-100 6 °F (38 1 °C)] 98 3 °F (36 8 °C)  HR:  [80-94] 87  Resp:  [16-18] 16  BP: (121-163)/() 153/99  SpO2:  [95 %-97 %] 96 %  Body mass index is 27 26 kg/m²  Input and Output Summary (last 24 hours): Intake/Output Summary (Last 24 hours) at 12/19/2019 1601  Last data filed at 12/19/2019 1201  Gross per 24 hour   Intake    Output 1350 ml   Net -1350 ml       Physical Exam:     Physical Exam   Constitutional: He is oriented to person, place, and time  He appears well-developed  No distress  HENT:   Head: Normocephalic  Neck: Normal range of motion  Cardiovascular: Normal rate and regular rhythm  Pulmonary/Chest: Effort normal and breath sounds normal  No respiratory distress  He has no wheezes  He has no rhonchi  He has no rales  Abdominal: Soft  Bowel sounds are normal  He exhibits no distension  There is no tenderness  Musculoskeletal: Normal range of motion  He exhibits no edema or tenderness  Neurological: He is alert and oriented to person, place, and time  Skin: Skin is warm and dry  He is not diaphoretic  Right wrist with free ROM and no hematoma    Psychiatric: He has a normal mood and affect  Judgment normal    Nursing note and vitals reviewed  Additional Data:     Labs:    Results from last 7 days   Lab Units 12/19/19  0527 12/18/19  0321   WBC Thousand/uL 6 02 7 02   HEMOGLOBIN g/dL 13 7 15 9   HEMATOCRIT % 41 7 47 8   PLATELETS Thousands/uL 175 211   NEUTROS PCT %  --  77*   LYMPHS PCT %  --  12*   MONOS PCT %  --  7   EOS PCT %  --  3     Results from last 7 days   Lab Units 12/19/19  0527 12/18/19  0321   POTASSIUM mmol/L 3 4* 3 2*   CHLORIDE mmol/L 106 98*   CO2 mmol/L 24 28   BUN mg/dL 6 10   CREATININE mg/dL 0 85 1 24   CALCIUM mg/dL 9 0 9 5   ALK PHOS U/L  --  169*   ALT U/L  --  41   AST U/L  --  27     Results from last 7 days   Lab Units 12/18/19  0459   INR  0 96       * I Have Reviewed All Lab Data Listed Above  * Additional Pertinent Lab Tests Reviewed:  All Labs Within Last 24 Hours Reviewed    Imaging:    Imaging Reports Reviewed Today Include: CXR  Imaging Personally Reviewed by Myself Includes:  None     Recent Cultures (last 7 days):           Last 24 Hours Medication List:     Current Facility-Administered Medications:  acetaminophen 650 mg Oral Q6H PRN Shady Braver, DO    aspirin 81 mg Oral Daily Chestine Nettle Veres, DO    atorvastatin 80 mg Oral After Countrytemo Langston MD    famotidine 20 mg Oral BID PRN Shady Braquyen, DO    fenofibrate 145 mg Oral Daily Wu Gayle MD    insulin glargine 15 Units Subcutaneous HS Chestine Nettle Veres, DO    insulin lispro 1-6 Units Subcutaneous TID AC Deacon Reno, DO    insulin lispro 1-6 Units Subcutaneous HS Chestine Nettle Versina, DO    insulin lispro 10 Units Subcutaneous TID With Meals Ji Nash MD    lisinopril 20 mg Oral Daily Wu Gayle MD    metoprolol succinate 25 mg Oral Daily Wu Gayle MD    nitroglycerin 0 4 mg Sublingual Q5 Min PRN Rosa Reno, DO    ondansetron 4 mg Intravenous Q6H PRN Shady Vergara, DO    sodium chloride 100 mL/hr Intravenous Continuous Wu Gayle MD Last Rate: Stopped (12/19/19 0700)   ticagrelor 90 mg Oral Q12H [facility] Queenie Mann MD         Today, Patient Was Seen By: NICOLETTE Noland    ** Please Note: Dictation voice to text software may have been used in the creation of this document   **

## 2019-12-20 LAB
GLUCOSE SERPL-MCNC: 136 MG/DL (ref 65–140)
GLUCOSE SERPL-MCNC: 225 MG/DL (ref 65–140)
GLUCOSE SERPL-MCNC: 236 MG/DL (ref 65–140)
GLUCOSE SERPL-MCNC: 268 MG/DL (ref 65–140)

## 2019-12-20 PROCEDURE — 99232 SBSQ HOSP IP/OBS MODERATE 35: CPT | Performed by: NURSE PRACTITIONER

## 2019-12-20 PROCEDURE — 99232 SBSQ HOSP IP/OBS MODERATE 35: CPT | Performed by: INTERNAL MEDICINE

## 2019-12-20 PROCEDURE — 99231 SBSQ HOSP IP/OBS SF/LOW 25: CPT | Performed by: INTERNAL MEDICINE

## 2019-12-20 PROCEDURE — 82948 REAGENT STRIP/BLOOD GLUCOSE: CPT

## 2019-12-20 RX ADMIN — INSULIN GLARGINE 15 UNITS: 100 INJECTION, SOLUTION SUBCUTANEOUS at 21:33

## 2019-12-20 RX ADMIN — TICAGRELOR 90 MG: 90 TABLET ORAL at 21:33

## 2019-12-20 RX ADMIN — INSULIN LISPRO 2 UNITS: 100 INJECTION, SOLUTION INTRAVENOUS; SUBCUTANEOUS at 18:02

## 2019-12-20 RX ADMIN — INSULIN LISPRO 3 UNITS: 100 INJECTION, SOLUTION INTRAVENOUS; SUBCUTANEOUS at 21:33

## 2019-12-20 RX ADMIN — METOPROLOL SUCCINATE 25 MG: 25 TABLET, EXTENDED RELEASE ORAL at 09:15

## 2019-12-20 RX ADMIN — FENOFIBRATE 145 MG: 145 TABLET, FILM COATED ORAL at 09:15

## 2019-12-20 RX ADMIN — INSULIN LISPRO 3 UNITS: 100 INJECTION, SOLUTION INTRAVENOUS; SUBCUTANEOUS at 11:13

## 2019-12-20 RX ADMIN — LISINOPRIL 20 MG: 20 TABLET ORAL at 09:15

## 2019-12-20 RX ADMIN — TICAGRELOR 90 MG: 90 TABLET ORAL at 09:18

## 2019-12-20 RX ADMIN — ATORVASTATIN CALCIUM 80 MG: 80 TABLET, FILM COATED ORAL at 17:59

## 2019-12-20 RX ADMIN — ASPIRIN 81 MG 81 MG: 81 TABLET ORAL at 09:16

## 2019-12-20 NOTE — PROGRESS NOTES
Progress Note - Lorena Stewart 38 y o  male MRN: [id number]    Unit/Bed#: CW2 218-01 Encounter: [id number]      CC: diabetes f/u    Subjective:   Lorena Stewart is a 38y o  year old male with type 2 diabetes  Feels well  No complaints  No hypoglycemia  Objective:     Vitals: Blood pressure 133/97, pulse 91, temperature 98 2 °F (36 8 °C), temperature source Oral, resp  rate 17, height 5' 10" (1 778 m), weight 76 7 kg (169 lb 3 2 oz), SpO2 95 %  ,Body mass index is 24 28 kg/m²  Intake/Output Summary (Last 24 hours) at 12/20/2019 1327  Last data filed at 12/20/2019 1226  Gross per 24 hour   Intake 460 ml   Output 800 ml   Net -340 ml       Physical Exam:  General Appearance: awake, appears stated age and cooperative  Head: Normocephalic, without obvious abnormality, atraumatic  Extremities: moves all extremities  Skin: Skin color and temperature normal    Pulm: no labored breathing    Lab, Imaging and other studies: I have personally reviewed pertinent reports  POC Glucose   Date Value   12/20/2019 236 mg/dl (H)   12/20/2019 136 mg/dl   12/19/2019 275 mg/dl (H)   12/19/2019 187 mg/dl (H)   12/19/2019 341 mg/dl (H)   12/19/2019 146 mg/dl (H)   12/18/2019 309 mg/dl (H)   12/18/2019 175 mg/dl (H)   12/18/2019 222 mg/dl (H)   12/18/2019 263 mg/dl (H)   04/10/2015 108 mg/dL       Assessment:  diabetes with hyperglycemia, coronary disease status post stent placement and non ST elevation myocardial infarction  Plan:  1  Type 2 diabetes with hyperglycemia-his blood sugar this morning was in the target range  He did have hyperglycemia after breakfast   Increase mealtime insulin to 15 units  Continue to monitor blood sugars over time  At discharge, the patient should be given a prescription for jardiance 25 mg per day in order to decrease the cardiovascular mortality by 40%      2   Coronary disease status post stent placement and non ST elevation myocardial infarction is being managed by Cardiology of the primary team     3  Okay to discharge from the endocrine standpoint  Follow up in the office in 2 to 4 weeks  Portions of the record may have been created with voice recognition software

## 2019-12-20 NOTE — PROGRESS NOTES
Consultation - Cardiology   Evgeny Jin 38 y o  male MRN: [id number]  Unit/Bed#: CW2 218-01 Encounter: [id number]      Assessment:  Principal Problem:    NSTEMI (non-ST elevated myocardial infarction) [facility])  Active Problems:    Alcohol-induced chronic pancreatitis ([address] )    Essential hypertension    Hypokalemia    Gastroesophageal reflux disease with esophagitis    Type 2 diabetes mellitus without complication, without long-term current use of insulin ([facility])    Portal vein thrombosis    Dyslipidemia    Assessment and plan    NSTEMI 1    - Cardiac catheterization revealed 99% mid LAD disease, 50% OM1  A drug-eluting stent was placed in the mid LAD  - echo- LVEF 45%, dyskinesis of the mid to distal anteroseptal wall, hypokinesis of the mid anteroseptal, mid inferoseptal, apical inferior, apical septal and apical walls normal RV size and function   -Discussed with him the importance of medication compliance going forward  - Continue aspirin 81, Brilinta 90 b i d  , switch metoprolol to succinate 25 daily, increase atorvastatin to 80 HS  - Start lisinopril 20 mg daily, start fenofibrate 145 daily  - case management consulted for Brilinta  If the too expensive ablation, switch to Plavix with a Plavix load of 300 mg and then resume 75 daily   - Right wrist hematoma stable  Good radial Pulse    Hypertension:  Started metoprolol succinate, lisinopril  Adjust therapy to reach goal < 130/80 mmHg    Hyperlipidemia:  Lipid panel revealed total cholesterol 331, triglyceride 841, HDL 37,   Started on atorvastatin 40 HS, given moderate-high TG levels, may benefit from addition of fenofibrate  Start fenofibrate 145 daily    Diabetes:  Untreated  HB A1c 13 3  Started on insulin  To be started on Empaglifozin for CV benefit    History of portal vein thrombosis:  Previously took warfarin      Chronic pancreatitis with pancreatic pseudocyst, J tube In the past   History of cholecystectomy    Eosinophilic esophagitis        Subjective:  Patient seen bedside  No acute events overnight  No chest pain, shortness of breath, orthopnea PND  Consults    Review of Systems:  Review of Systems    As per HPI    Historical Information   Past Medical History:   Diagnosis Date    Diabetes mellitus ([address] )     pre-diabetic     Hypertension     Pancreatitis     Portal vein thrombosis      Past Surgical History:   Procedure Laterality Date    ABDOMINAL SURGERY      CHOLECYSTECTOMY      LA EDG US EXAM SURGICAL ALTER STOM DUODENUM/JEJUNUM N/A 7/24/2017    Procedure: LINEAR ENDOSCOPIC U/S WITH AXIOS STENT;  Surgeon: Angela Lorenz MD;  Location: [facility]; Service: Gastroenterology    LA EDG US EXAM SURGICAL ALTER STOM DUODENUM/JEJUNUM N/A 9/14/2017    Procedure: LINEAR ENDOSCOPIC U/S POSSIBLE AXIOS;  Surgeon: Angela Lorenz MD;  Location: [facility]; Service: Gastroenterology    WISDOM TOOTH EXTRACTION      20 years ago     Social History     Substance and Sexual Activity   Alcohol Use Yes    Comment: socially -rarely     Social History     Substance and Sexual Activity   Drug Use No     Social History     Tobacco Use   Smoking Status Never Smoker   Smokeless Tobacco Never Used     Family History: non-contributory    Meds/Allergies   PTA meds:   Prior to Admission Medications   Prescriptions Last Dose Informant Patient Reported? Taking? Multiple Vitamins-Minerals (MULTIVITAMIN WITH MINERALS) tablet   Yes Yes   Sig: Take 1 tablet by mouth daily   Omega-3 Fatty Acids (FISH OIL) 1,000 mg   Yes Yes   Sig: Take 1,000 mg by mouth daily      Facility-Administered Medications: None     No Known Allergies    Objective   Vitals: Blood pressure 133/97, pulse 91, temperature 98 2 °F (36 8 °C), temperature source Oral, resp  rate 17, height 5' 10" (1 778 m), weight 76 7 kg (169 lb 3 2 oz), SpO2 95 %  , Body mass index is 24 28 kg/m² ,   Orthostatic Blood Pressures      Most Recent Value   Blood Pressure  133/97 filed at 12/20/2019 0727   Patient Position - Orthostatic VS  Lying filed at 12/20/2019 0100            Intake/Output Summary (Last 24 hours) at 12/20/2019 1002  Last data filed at 12/20/2019 0755  Gross per 24 hour   Intake 240 ml   Output 875 ml   Net -635 ml       Invasive Devices     Peripheral Intravenous Line            Peripheral IV 12/18/19 Left Antecubital 2 days              Physical Exam    Gen: No acute distress  HEENT: anicteric, mucous membranes moist  Neck: supple, no jugular venous distention, or carotid bruit  Heart: regular, normal s1 and s2, no murmur/rub or gallop  Lungs :clear to auscultation bilaterally, no rales/rhonchi or wheeze  Abdomen: soft nontender, normoactive bowel sounds, no organomegaly  Ext: no edema    Lab Results:     Lab Results   Component Value Date    TROPONINI 6 45 (H) 12/18/2019    TROPONINI 2 84 (H) 12/18/2019    TROPONINI 0 79 (H) 12/18/2019       Lab Results   Component Value Date    GLUCOSE 100 06/26/2015    CALCIUM 9 0 12/19/2019     06/26/2015    K 3 4 (L) 12/19/2019    CO2 24 12/19/2019     12/19/2019    BUN 6 12/19/2019    CREATININE 0 85 12/19/2019       Lab Results   Component Value Date    WBC 6 02 12/19/2019    HGB 13 7 12/19/2019    HCT 41 7 12/19/2019    MCV 78 (L) 12/19/2019     12/19/2019       Lab Results   Component Value Date    CHOL 186 12/15/2014     Lab Results   Component Value Date    HDL 37 (L) 12/18/2019    HDL 46 12/15/2014     Lab Results   Component Value Date    LDLCALC  12/18/2019      Comment:      Calculated LDL invalid, triglycerides >400 mg/dl    LDLCALC 114 (H) 12/15/2014     Lab Results   Component Value Date    TRIG 841 (H) 12/18/2019    TRIG 129 12/15/2014       Lab Results   Component Value Date    ALT 41 12/18/2019    AST 27 12/18/2019       Results from last 7 days   Lab Units 12/18/19  0459   INR  0 96         Imaging: I have personally reviewed pertinent reports
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
## 2019-12-21 LAB
ANION GAP SERPL CALCULATED.3IONS-SCNC: 4 MMOL/L (ref 4–13)
BUN SERPL-MCNC: 19 MG/DL (ref 5–25)
CALCIUM SERPL-MCNC: 9.5 MG/DL (ref 8.3–10.1)
CHLORIDE SERPL-SCNC: 109 MMOL/L (ref 100–108)
CO2 SERPL-SCNC: 25 MMOL/L (ref 21–32)
CREAT SERPL-MCNC: 0.97 MG/DL (ref 0.6–1.3)
GFR SERPL CREATININE-BSD FRML MDRD: 94 ML/MIN/1.73SQ M
GLUCOSE SERPL-MCNC: 157 MG/DL (ref 65–140)
GLUCOSE SERPL-MCNC: 203 MG/DL (ref 65–140)
GLUCOSE SERPL-MCNC: 230 MG/DL (ref 65–140)
GLUCOSE SERPL-MCNC: 239 MG/DL (ref 65–140)
GLUCOSE SERPL-MCNC: 302 MG/DL (ref 65–140)
MAGNESIUM SERPL-MCNC: 2.1 MG/DL (ref 1.6–2.6)
POTASSIUM SERPL-SCNC: 3.6 MMOL/L (ref 3.5–5.3)
SODIUM SERPL-SCNC: 138 MMOL/L (ref 136–145)

## 2019-12-21 PROCEDURE — 83735 ASSAY OF MAGNESIUM: CPT | Performed by: NURSE PRACTITIONER

## 2019-12-21 PROCEDURE — 99232 SBSQ HOSP IP/OBS MODERATE 35: CPT | Performed by: INTERNAL MEDICINE

## 2019-12-21 PROCEDURE — 99232 SBSQ HOSP IP/OBS MODERATE 35: CPT | Performed by: NURSE PRACTITIONER

## 2019-12-21 PROCEDURE — 80048 BASIC METABOLIC PNL TOTAL CA: CPT | Performed by: NURSE PRACTITIONER

## 2019-12-21 PROCEDURE — 82948 REAGENT STRIP/BLOOD GLUCOSE: CPT

## 2019-12-21 RX ORDER — CLOPIDOGREL BISULFATE 75 MG/1
75 TABLET ORAL DAILY
Status: DISCONTINUED | OUTPATIENT
Start: 2019-12-22 | End: 2019-12-21

## 2019-12-21 RX ORDER — CLOPIDOGREL BISULFATE 75 MG/1
75 TABLET ORAL DAILY
Status: DISCONTINUED | OUTPATIENT
Start: 2019-12-23 | End: 2019-12-22 | Stop reason: HOSPADM

## 2019-12-21 RX ORDER — CLOPIDOGREL BISULFATE 75 MG/1
300 TABLET ORAL ONCE
Status: DISCONTINUED | OUTPATIENT
Start: 2019-12-21 | End: 2019-12-21

## 2019-12-21 RX ORDER — CLOPIDOGREL BISULFATE 75 MG/1
300 TABLET ORAL ONCE
Status: COMPLETED | OUTPATIENT
Start: 2019-12-22 | End: 2019-12-22

## 2019-12-21 RX ORDER — CLOPIDOGREL BISULFATE 75 MG/1
75 TABLET ORAL DAILY
Qty: 30 TABLET | Refills: 0 | Status: CANCELLED | OUTPATIENT
Start: 2019-12-22

## 2019-12-21 RX ORDER — INSULIN GLARGINE 100 [IU]/ML
40 INJECTION, SOLUTION SUBCUTANEOUS
Status: DISCONTINUED | OUTPATIENT
Start: 2019-12-21 | End: 2019-12-22 | Stop reason: HOSPADM

## 2019-12-21 RX ADMIN — INSULIN GLARGINE 40 UNITS: 100 INJECTION, SOLUTION SUBCUTANEOUS at 21:20

## 2019-12-21 RX ADMIN — INSULIN LISPRO 3 UNITS: 100 INJECTION, SOLUTION INTRAVENOUS; SUBCUTANEOUS at 06:09

## 2019-12-21 RX ADMIN — ASPIRIN 81 MG 81 MG: 81 TABLET ORAL at 08:44

## 2019-12-21 RX ADMIN — FENOFIBRATE 145 MG: 145 TABLET, FILM COATED ORAL at 08:44

## 2019-12-21 RX ADMIN — INSULIN LISPRO 3 UNITS: 100 INJECTION, SOLUTION INTRAVENOUS; SUBCUTANEOUS at 21:20

## 2019-12-21 RX ADMIN — TICAGRELOR 90 MG: 90 TABLET ORAL at 08:44

## 2019-12-21 RX ADMIN — LISINOPRIL 20 MG: 20 TABLET ORAL at 08:44

## 2019-12-21 RX ADMIN — METOPROLOL SUCCINATE 25 MG: 25 TABLET, EXTENDED RELEASE ORAL at 08:44

## 2019-12-21 RX ADMIN — INSULIN LISPRO 2 UNITS: 100 INJECTION, SOLUTION INTRAVENOUS; SUBCUTANEOUS at 17:18

## 2019-12-21 RX ADMIN — ATORVASTATIN CALCIUM 80 MG: 80 TABLET, FILM COATED ORAL at 17:17

## 2019-12-21 RX ADMIN — INSULIN LISPRO 4 UNITS: 100 INJECTION, SOLUTION INTRAVENOUS; SUBCUTANEOUS at 11:48

## 2019-12-21 NOTE — ASSESSMENT & PLAN NOTE
Lab Results   Component Value Date    HGBA1C 13 3 (H) 12/18/2019       Recent Labs     12/19/19  2056 12/20/19  0701 12/20/19  1041 12/20/19  1610   POCGLU 275* 136 236* 225*     Uncontrolled as evidenced by A1c 13 3  Was told that he was a borderline diabetic approximately 2 years ago and took a 1 month supply of metformin  Has had no follow-up since  Will need insulin on discharge, this was discussed with patient   Started on Lantus 15 units qHS and Humalog 10 units with meals  Continue sliding scale  [facility] following   Will need above insulin + Jardiance on discharge with close follow-up    Nutrition consult   Discussed with with nursing in regard to education with insulin pen

## 2019-12-21 NOTE — ASSESSMENT & PLAN NOTE
· Patient admitted with chest pain  Found to have elevated troponin levels  · S/p cardiac cath s/p MARY to LAD on 12/18/19  · Echo- LVEF 45%, dyskinesis of the mid to distal anteroseptal wall, hypokinesis of the mid anteroseptal, mid inferoseptal, apical inferior, apical septal and apical walls normal RV size and function  · Started on aspirin 81, Brilinta 90 BID(however pt unable to afford copay of greater than $170) , Lisinopril 20 mg daily, Fenobibrate 145 mg daily, switched metoprolol to succinate 25 daily, increased atorvastatin to 80 HS  · Start Jardiance on discharge per cards and endo (scripts sent pending final price pt reports some scripts being confirmed, will chk in the am )   · Case management consult for Brilinta (discussed with cm and cardiology fellow) no reply via tt will discuss in the am   · [facility] cost $173/month  ·  patient's pharmacy, [facility]  · cost is too expensive, per Dr Sarai Brito, ok to load with Plavix 300 mg po x1 followed by 75 mg daily in place of Brilinta however after discussion with pharmacy pt received am dose of brilinta and will need plavix load in am 24 hrs after before starting other daily dosing   · Right wrist hematoma stable    Good radial Pulse

## 2019-12-21 NOTE — PROGRESS NOTES
Progress Note - Lex Liu 1975, 38 y o  male MRN: [id number]    Unit/Bed#: CW2 212-02 Encounter: [id number]    Primary Care Provider: Esvin Luke DO   Date and time admitted to hospital: 12/18/2019  3:09 AM        * NSTEMI (non-ST elevated myocardial infarction) [facility])  Assessment & Plan  · Patient admitted with chest pain  Found to have elevated troponin levels  · S/p cardiac cath s/p MARY to LAD on 12/18/19  · Echo- LVEF 45%, dyskinesis of the mid to distal anteroseptal wall, hypokinesis of the mid anteroseptal, mid inferoseptal, apical inferior, apical septal and apical walls normal RV size and function  · Started on aspirin 81, Brilinta 90 BID(however pt unable to afford copay of greater than $170) , Lisinopril 20 mg daily, Fenobibrate 145 mg daily, switched metoprolol to succinate 25 daily, increased atorvastatin to 80 HS  · Start Jardiance on discharge per cards and endo (scripts sent pending final price pt reports some scripts being confirmed, will chk in the am )   · Case management consult for Brilinta (discussed with cm and cardiology fellow) no reply via tt will discuss in the am   · [facility] cost $173/month  ·  patient's pharmacy, [facility]  · cost is too expensive, per Dr Florinda Del Castillo, ok to load with Plavix 300 mg po x1 followed by 75 mg daily in place of Brilinta however after discussion with pharmacy pt received am dose of brilinta and will need plavix load in am 24 hrs after before starting other daily dosing   · Right wrist hematoma stable  Good radial Pulse    Dyslipidemia  Assessment & Plan  · Triglyceride levels greater than 800  Now started on atorvastatin and fenofibrate     · Continue outpt follow up with endocrinology and cardiology   · Improved blood glucose control will assist with improved cholesterol levels   · Pt states he is committed to trying to get this down and manage diabetes in order to get off insulin     Type 2 diabetes mellitus without complication, without long-term current use of insulin [facility])  Assessment & Plan  Lab Results   Component Value Date    HGBA1C 13 3 (H) 12/18/2019       Recent Labs     12/20/19  2103 12/21/19  0605 12/21/19  1059 12/21/19  1602   POCGLU 268* 230* 302* 157*     Uncontrolled as evidenced by A1c 13 3  Was told that he was a borderline diabetic approximately 2 years ago and took a 1 month supply of metformin  Has had no follow-up since  Will need insulin on discharge, this was discussed with patient   Started on Lantus 15 units qHS and Humalog 10 units with meals  Continue sliding scale  [facility] following  Will need above insulin + Jardiance on discharge with close follow-up    Nutrition consult   Discussed with with nursing in regard to education with insulin pen     Hypokalemia  Assessment & Plan  · Repleted on admission, F/u BMP in AM   · Hypokalemia yest no repeat labs will repeat in am     Alcohol-induced chronic pancreatitis ([facility] [address] )  Assessment & Plan  · Currently without abdominal pain  Essential hypertensionresolved as of 12/22/2019  Assessment & Plan  · Blood pressure acceptable  · Started Metoprolol succinate and lisinopril  ·  Monitor  Goal /80     Portal vein thrombosis  Assessment & Plan  · H/o portal vein thrombosis previously on coumadin  Gastroesophageal reflux disease with esophagitis  Assessment & Plan  · History of eosinophilic esophagitis  · Continue Pepcid  VTE Pharmacologic Prophylaxis:   Pharmacologic: ambulatory and on venodynes   Mechanical VTE Prophylaxis in Place: Yes    Patient Centered Rounds: I have performed bedside rounds with nursing staff today  Discussions with Specialists or Other Care Team Provider: nursing   Education and Discussions with Family / Patient: patient     Time Spent for Care: 1 hour  More than 50% of total time spent on counseling and coordination of care as described above      Current Length of Stay: 4 day(s)    Current Patient Status: Inpatient Certification Statement: The patient will continue to require additional inpatient hospital stay due to to need to load with plavix pt already recieved brilinta today    Discharge Plan: home tomorrow     Code Status: Prior      Subjective:   Very long conversation and education with the pt who states he is going to change his diet and do much better   He has no other complaints doing well no sob no dizziness or lightheadedness explained to the pt that I would need to load him with plavix in am as he already received brilinta in the am and needs 24 hrs before plavix load as discussed with pharm and cards    Objective:     Vitals:   No data recorded  Body mass index is 24 28 kg/m²  Input and Output Summary (last 24 hours):     No intake or output data in the 24 hours ending 12/24/19 9769    Physical Exam:     Physical Exam   Constitutional: He is oriented to person, place, and time  No distress  HENT:   Head: Normocephalic  Mouth/Throat: No oropharyngeal exudate  Eyes: Right eye exhibits no discharge  Left eye exhibits no discharge  No scleral icterus  Neck: No JVD present  No tracheal deviation present  Cardiovascular: Normal rate  Exam reveals no gallop and no friction rub  No murmur heard  Pulmonary/Chest: Effort normal  No stridor  No respiratory distress  He has no wheezes  He has no rales  He exhibits no tenderness  Abdominal: Soft  He exhibits no distension and no mass  There is no tenderness  There is no rebound and no guarding  No hernia  Musculoskeletal: He exhibits no edema, tenderness or deformity  Lymphadenopathy:     He has no cervical adenopathy  Neurological: He is oriented to person, place, and time  Skin: No rash noted  He is not diaphoretic  No erythema  No pallor  Psychiatric: He has a normal mood and affect           Additional Data:     Labs:    Results from last 7 days   Lab Units 12/19/19  0527 12/18/19  0321   WBC Thousand/uL 6 02 7 02   HEMOGLOBIN g/dL 13 7 15 9   HEMATOCRIT % 41 7 47 8   PLATELETS Thousands/uL 175 211   NEUTROS PCT %  --  77*   LYMPHS PCT %  --  12*   MONOS PCT %  --  7   EOS PCT %  --  3     Results from last 7 days   Lab Units 12/21/19  0524  12/18/19  0321   SODIUM mmol/L 138   < > 133*   POTASSIUM mmol/L 3 6   < > 3 2*   CHLORIDE mmol/L 109*   < > 98*   CO2 mmol/L 25   < > 28   BUN mg/dL 19   < > 10   CREATININE mg/dL 0 97   < > 1 24   ANION GAP mmol/L 4   < > 7   CALCIUM mg/dL 9 5   < > 9 5   ALBUMIN g/dL  --   --  4 4   TOTAL BILIRUBIN mg/dL  --   --  0 43   ALK PHOS U/L  --   --  169*   ALT U/L  --   --  41   AST U/L  --   --  27   GLUCOSE RANDOM mg/dL 203*   < > 372*    < > = values in this interval not displayed  Results from last 7 days   Lab Units 12/18/19  0459   INR  0 96     Results from last 7 days   Lab Units 12/22/19  1043 12/22/19  1035 12/22/19  0622 12/21/19  2058 12/21/19  1602 12/21/19  1059 12/21/19  0605 12/20/19  2103 12/20/19  1610 12/20/19  1041 12/20/19  0701 12/19/19  2056   POC GLUCOSE mg/dl 371* 396* 114 239* 157* 302* 230* 268* 225* 236* 136 275*     Results from last 7 days   Lab Units 12/18/19  0321   HEMOGLOBIN A1C % 13 3*               * I Have Reviewed All Lab Data Listed Above  * Additional Pertinent Lab Tests Reviewed: All Labs Within Last 24 Hours Reviewed    Imaging:    Imaging Reports Reviewed Today Include: reviewed    Recent Cultures (last 7 days):           Last 24 Hours Medication List:        Today, Patient Was Seen By: NICOLETTE Harvey Cea    ** Please Note: Dictation voice to text software may have been used in the creation of this document   **

## 2019-12-21 NOTE — ASSESSMENT & PLAN NOTE
Lab Results   Component Value Date    HGBA1C 13 3 (H) 12/18/2019       Recent Labs     12/20/19  2103 12/21/19  0605 12/21/19  1059 12/21/19  1602   POCGLU 268* 230* 302* 157*     Uncontrolled as evidenced by A1c 13 3  Was told that he was a borderline diabetic approximately 2 years ago and took a 1 month supply of metformin  Has had no follow-up since  Will need insulin on discharge, this was discussed with patient   Started on Lantus 15 units qHS and Humalog 10 units with meals  Continue sliding scale  [facility] following   Will need above insulin + Jardiance on discharge with close follow-up    Nutrition consult   Discussed with with nursing in regard to education with insulin pen

## 2019-12-21 NOTE — PROGRESS NOTES
Progress Note - Ryan Arteaga 1975, 38 y o  male MRN: [id number]    Unit/Bed#: CW2 218-01 Encounter: [id number]    Primary Care Provider: Mery Snowden DO   Date and time admitted to hospital: 12/18/2019  3:09 AM        * NSTEMI (non-ST elevated myocardial infarction) [facility])  Assessment & Plan  · Patient admitted with chest pain  Found to have elevated troponin levels  · S/p cardiac cath s/p MARY to LAD on 12/18/19  · Echo- LVEF 45%, dyskinesis of the mid to distal anteroseptal wall, hypokinesis of the mid anteroseptal, mid inferoseptal, apical inferior, apical septal and apical walls normal RV size and function  · Started on aspirin 81, Brilinta 90 BID(however pt unable to afford copay of greater than $170) , Lisinopril 20 mg daily, Fenobibrate 145 mg daily, switched metoprolol to succinate 25 daily, increased atorvastatin to 80 HS  · Start Jardiance on discharge per cards and endo (scripts sent pending final price pt reports some scripts being confirmed, will chk in the am )   · Case management consult for Brilinta (discussed with cm and cardiology fellow) no reply via tt will discuss in the am   · [facility] cost $173/month  ·  patient's pharmacy, [facility]  · cost is too expensive, per anatoly Rabago to load with Plavix 300 mg po x1 followed by 75 mg daily in place of Brilinta   · Right wrist hematoma stable  Good radial Pulse    Dyslipidemia  Assessment & Plan  · Triglyceride levels greater than 800  Now started on atorvastatin and fenofibrate     · Continue outpt follow up with endocrinology and cardiology   · Improved blood glucose control will assist with improved cholesterol levels   · Pt states he is committed to trying to get this down and manage diabetes in order to get off insulin     Type 2 diabetes mellitus without complication, without long-term current use of insulin [facility])  Assessment & Plan  Lab Results   Component Value Date    HGBA1C 13 3 (H) 12/18/2019       Recent Labs     12/19/19 2056 12/20/19  0701 12/20/19  1041 12/20/19  1610   POCGLU 275* 136 236* 225*     Uncontrolled as evidenced by A1c 13 3  Was told that he was a borderline diabetic approximately 2 years ago and took a 1 month supply of metformin  Has had no follow-up since  Will need insulin on discharge, this was discussed with patient   Started on Lantus 15 units qHS and Humalog 10 units with meals  Continue sliding scale  [facility] following  Will need above insulin + Jardiance on discharge with close follow-up    Nutrition consult   Discussed with with nursing in regard to education with insulin pen     Hypokalemia  Assessment & Plan  · Repleted on admission, F/u BMP in AM   · Hypokalemia yest no repeat labs will repeat in am     Essential hypertension  Assessment & Plan  · Blood pressure acceptable  · Started Metoprolol succinate and lisinopril  ·  Monitor  Goal /80     Alcohol-induced chronic pancreatitis (HCC)  Assessment & Plan  · Currently without abdominal pain  Portal vein thrombosis  Assessment & Plan  · H/o portal vein thrombosis previously on coumadin  Gastroesophageal reflux disease with esophagitis  Assessment & Plan  · History of eosinophilic esophagitis  · Continue Pepcid  VTE Pharmacologic Prophylaxis:   Pharmacologic: Pharmacologic VTE Prophylaxis contraindicated due to resolving hematoma and transition to plavix   Mechanical VTE Prophylaxis in Place: Yes    Patient Centered Rounds: I have performed bedside rounds with nursing staff today  Discussions with Specialists or Other Care Team Provider: nursing     Education and Discussions with Family / Patient: patient     Time Spent for Care: 1 hour  More than 50% of total time spent on counseling and coordination of care as described above      Current Length of Stay: 2 day(s)    Current Patient Status: Inpatient   Certification Statement: The patient will continue to require additional inpatient hospital stay due to need to teach insulin administration as well as transition to plavix     Discharge Plan: discharge to home tomorrow with ongoing education     Code Status: Level 1 - Full Code      Subjective: On arrival pt is eating   Long discussion in regard to diet and medications  Will need to transition the pt to plavix as cannot afford brilinta  Will administer last dose tonight and transition to load in the am     Objective:     Vitals:   Temp (24hrs), Av 1 °F (36 7 °C), Min:97 7 °F (36 5 °C), Max:98 5 °F (36 9 °C)    Temp:  [97 7 °F (36 5 °C)-98 5 °F (36 9 °C)] 97 7 °F (36 5 °C)  HR:  [67-91] 79  Resp:  [17-18] 18  BP: (106-135)/(70-97) 106/71  SpO2:  [95 %-97 %] 96 %  Body mass index is 24 28 kg/m²  Input and Output Summary (last 24 hours): Intake/Output Summary (Last 24 hours) at 2019  Last data filed at 2019 173  Gross per 24 hour   Intake 640 ml   Output 800 ml   Net -160 ml       Physical Exam:     Physical Exam   Constitutional: He is oriented to person, place, and time  No distress  HENT:   Head: Normocephalic  Eyes: Conjunctivae are normal  Right eye exhibits no discharge  Left eye exhibits no discharge  No scleral icterus  Neck: No tracheal deviation present  No thyromegaly present  Cardiovascular: Normal rate  Exam reveals no gallop and no friction rub  No murmur heard  Pulmonary/Chest: Effort normal  No stridor  No respiratory distress  He has no wheezes  He has no rales  He exhibits no tenderness  Abdominal: Soft  He exhibits no distension and no mass  There is no tenderness  There is no rebound and no guarding  Musculoskeletal: He exhibits no edema, tenderness or deformity  Lymphadenopathy:     He has no cervical adenopathy  Neurological: He is oriented to person, place, and time  Skin: No rash noted  He is not diaphoretic  No erythema  No pallor  Psychiatric: He has a normal mood and affect           Additional Data:     Labs:    Results from last 7 days   Lab Units 12/19/19  0527 12/18/19  0321   WBC Thousand/uL 6 02 7 02   HEMOGLOBIN g/dL 13 7 15 9   HEMATOCRIT % 41 7 47 8   PLATELETS Thousands/uL 175 211   NEUTROS PCT %  --  77*   LYMPHS PCT %  --  12*   MONOS PCT %  --  7   EOS PCT %  --  3     Results from last 7 days   Lab Units 12/19/19  0527 12/18/19  0321   SODIUM mmol/L 136 133*   POTASSIUM mmol/L 3 4* 3 2*   CHLORIDE mmol/L 106 98*   CO2 mmol/L 24 28   BUN mg/dL 6 10   CREATININE mg/dL 0 85 1 24   ANION GAP mmol/L 6 7   CALCIUM mg/dL 9 0 9 5   ALBUMIN g/dL  --  4 4   TOTAL BILIRUBIN mg/dL  --  0 43   ALK PHOS U/L  --  169*   ALT U/L  --  41   AST U/L  --  27   GLUCOSE RANDOM mg/dL 150* 372*     Results from last 7 days   Lab Units 12/18/19  0459   INR  0 96     Results from last 7 days   Lab Units 12/20/19  1610 12/20/19  1041 12/20/19  0701 12/19/19  2056 12/19/19  1607 12/19/19  1036 12/19/19  0625 12/18/19  2101 12/18/19  1045 12/18/19  0644 12/18/19  0529   POC GLUCOSE mg/dl 225* 236* 136 275* 187* 341* 146* 309* 175* 222* 263*     Results from last 7 days   Lab Units 12/18/19  0321   HEMOGLOBIN A1C % 13 3*               * I Have Reviewed All Lab Data Listed Above  * Additional Pertinent Lab Tests Reviewed:  All Labs Within Last 24 Hours Reviewed    Imaging:    Imaging Reports Reviewed Today Include: reviewed    Recent Cultures (last 7 days):           Last 24 Hours Medication List:     Current Facility-Administered Medications:  acetaminophen 650 mg Oral Q6H PRN Bernardo Metro, DO   aspirin 81 mg Oral Daily Ely Reno DO   atorvastatin 80 mg Oral After [employer] MD Ivis   famotidine 20 mg Oral BID PRN Bernardorachel Cavazos, DO   fenofibrate 145 mg Oral Daily Wu Barbour MD   insulin glargine 15 Units Subcutaneous HS Ely Reno DO   insulin lispro 1-6 Units Subcutaneous TID AC Deacon D Veres, DO   insulin lispro 1-6 Units Subcutaneous HS Ely Reno, DO   insulin lispro 10 Units Subcutaneous TID With Meals Claudean Sola, MD   lisinopril 20 mg Oral Daily Srinidhi Sheryle Hines, MD   metoprolol succinate 25 mg Oral Daily Srinidhi Sheryle Hines, MD   nitroglycerin 0 4 mg Sublingual Q5 Min PRN Lindsay Ramal Veres, DO   ondansetron 4 mg Intravenous Q6H PRN Mariia Collins, DO   ticagrelor 90 mg Oral Q12H [facility] Hannah Serna MD        Today, Patient Was Seen By: NICOLETTE Palomo    ** Please Note: Dictation voice to text software may have been used in the creation of this document   **

## 2019-12-21 NOTE — ASSESSMENT & PLAN NOTE
· Patient admitted with chest pain  Found to have elevated troponin levels  · S/p cardiac cath s/p MARY to LAD on 12/18/19  · Echo- LVEF 45%, dyskinesis of the mid to distal anteroseptal wall, hypokinesis of the mid anteroseptal, mid inferoseptal, apical inferior, apical septal and apical walls normal RV size and function  · Started on aspirin 81, Brilinta 90 BID(however pt unable to afford copay of greater than $170) , Lisinopril 20 mg daily, Fenobibrate 145 mg daily, switched metoprolol to succinate 25 daily, increased atorvastatin to 80 HS  · Start Jardiance on discharge per cards and endo (scripts sent pending final price pt reports some scripts being confirmed, will chk in the am )   · Case management consult for Brilinta (discussed with cm and cardiology fellow) no reply via tt will discuss in the am   · [facility] cost $173/month  ·  patient's pharmacy, [facility]  · cost is too expensive, per Dr Kristian Grady, ok to load with Plavix 300 mg po x1 followed by 75 mg daily in place of Brilinta   · Right wrist hematoma stable    Good radial Pulse

## 2019-12-21 NOTE — ASSESSMENT & PLAN NOTE
· Triglyceride levels greater than 800  Now started on atorvastatin and fenofibrate     · Continue outpt follow up with endocrinology and cardiology   · Improved blood glucose control will assist with improved cholesterol levels   · Pt states he is committed to trying to get this down and manage diabetes in order to get off insulin

## 2019-12-22 VITALS
RESPIRATION RATE: 18 BRPM | SYSTOLIC BLOOD PRESSURE: 139 MMHG | WEIGHT: 169.2 LBS | HEIGHT: 70 IN | BODY MASS INDEX: 24.22 KG/M2 | DIASTOLIC BLOOD PRESSURE: 97 MMHG | TEMPERATURE: 97.8 F | OXYGEN SATURATION: 100 % | HEART RATE: 77 BPM

## 2019-12-22 PROBLEM — I10 ESSENTIAL HYPERTENSION: Status: RESOLVED | Noted: 2017-06-02 | Resolved: 2019-12-22

## 2019-12-22 LAB
GLUCOSE SERPL-MCNC: 114 MG/DL (ref 65–140)
GLUCOSE SERPL-MCNC: 371 MG/DL (ref 65–140)
GLUCOSE SERPL-MCNC: 396 MG/DL (ref 65–140)

## 2019-12-22 PROCEDURE — 82948 REAGENT STRIP/BLOOD GLUCOSE: CPT

## 2019-12-22 PROCEDURE — 99239 HOSP IP/OBS DSCHRG MGMT >30: CPT | Performed by: NURSE PRACTITIONER

## 2019-12-22 RX ORDER — ATORVASTATIN CALCIUM 80 MG/1
80 TABLET, FILM COATED ORAL
Qty: 30 TABLET | Refills: 0 | Status: SHIPPED | OUTPATIENT
Start: 2019-12-22 | End: 2019-12-26

## 2019-12-22 RX ORDER — CLOPIDOGREL BISULFATE 75 MG/1
75 TABLET ORAL DAILY
Qty: 30 TABLET | Refills: 0 | Status: SHIPPED | OUTPATIENT
Start: 2019-12-23 | End: 2019-12-22

## 2019-12-22 RX ORDER — NITROGLYCERIN 0.4 MG/1
0.4 TABLET SUBLINGUAL
Qty: 20 TABLET | Refills: 0 | Status: SHIPPED | OUTPATIENT
Start: 2019-12-22 | End: 2019-12-26

## 2019-12-22 RX ORDER — METOPROLOL SUCCINATE 25 MG/1
25 TABLET, EXTENDED RELEASE ORAL DAILY
Qty: 30 TABLET | Refills: 0 | Status: SHIPPED | OUTPATIENT
Start: 2019-12-22 | End: 2019-12-26

## 2019-12-22 RX ORDER — LISINOPRIL 20 MG/1
20 TABLET ORAL DAILY
Qty: 30 TABLET | Refills: 0 | Status: SHIPPED | OUTPATIENT
Start: 2019-12-22 | End: 2019-12-26

## 2019-12-22 RX ORDER — FENOFIBRATE 145 MG/1
145 TABLET, COATED ORAL DAILY
Qty: 30 TABLET | Refills: 0 | Status: SHIPPED | OUTPATIENT
Start: 2019-12-22 | End: 2019-12-26

## 2019-12-22 RX ORDER — ASPIRIN 81 MG/1
81 TABLET, CHEWABLE ORAL DAILY
Qty: 30 TABLET | Refills: 0 | Status: SHIPPED | OUTPATIENT
Start: 2019-12-22 | End: 2019-12-26

## 2019-12-22 RX ORDER — CLOPIDOGREL BISULFATE 75 MG/1
75 TABLET ORAL DAILY
Qty: 30 TABLET | Refills: 0 | Status: SHIPPED | OUTPATIENT
Start: 2019-12-23 | End: 2019-12-26

## 2019-12-22 RX ADMIN — METOPROLOL SUCCINATE 25 MG: 25 TABLET, EXTENDED RELEASE ORAL at 08:11

## 2019-12-22 RX ADMIN — LISINOPRIL 20 MG: 20 TABLET ORAL at 08:11

## 2019-12-22 RX ADMIN — FENOFIBRATE 145 MG: 145 TABLET, FILM COATED ORAL at 08:10

## 2019-12-22 RX ADMIN — CLOPIDOGREL BISULFATE 300 MG: 75 TABLET ORAL at 06:19

## 2019-12-22 RX ADMIN — INSULIN LISPRO 10 UNITS: 100 INJECTION, SOLUTION INTRAVENOUS; SUBCUTANEOUS at 11:05

## 2019-12-22 RX ADMIN — ASPIRIN 81 MG 81 MG: 81 TABLET ORAL at 08:11

## 2019-12-22 NOTE — DISCHARGE INSTRUCTIONS
Meal Planning with the Plate Method   WHAT YOU NEED TO KNOW:   Meal planning with the plate method is a simple way for people with diabetes to plan meals  This method can help you to eat the right amount of carbohydrates and keep your blood sugar levels under control  Carbohydrates naturally raise your blood sugar after eating  Your blood sugar can rise to a high level if you eat too much carbohydrate at one time  Carbohydrates are found in starches (bread, cereal, starchy vegetables, and beans), fruit, milk, yogurt, and sweets  DISCHARGE INSTRUCTIONS:   How to use the plate method to plan meals:   · Draw an imaginary line down the middle of a 9-inch dinner plate  On one side, draw another line to divide that section in half  Your plate will have 3 sections  · Fill the largest section of your plate with non-starchy vegetables  These include broccoli, spinach, cucumbers, peppers, cauliflower, and tomatoes  · Add a starch to 1 of the small sections of your plate  Starches include pasta, rice, whole-grain bread, tortillas, corn, potatoes, and beans  · Add meat or another source of protein to the other small section of your plate  Examples include chicken or turkey without skin, fish, lean beef or pork, low-fat cheese, tofu, or eggs  · Add dairy or fruit to the side of your plate if your meal plan allows  Examples of dairy include skim or 1% milk or low-fat yogurt  If you do not drink milk, you may be able to add another serving of starchy food instead  · Add a low-calorie or calorie-free drink such as water or unsweetened tea or coffee         Serving sizes of foods:   · Non-starchy vegetables:      ¨ ½ cup of cooked vegetables or 1 cup of raw vegetables    ¨ ½ cup of vegetable juice    · Starches:      ¨ 1 ounce of whole-wheat bread or 1 small (6 inch) flour or corn tortilla    ¨ 1 small (4 inch) pancake (about ¼ inch thick)    ¨ ¾ cup of dry, unsweetened, whole-grain ready-to-eat cereal or ¼ cup of low-fat granola    ¨ ½ cup of cooked cereal or oatmeal    ¨ ? cup of rice or pasta     ¨ ½ cup of corn, green peas, sweet potatoes, or mashed potatoes    ¨ ½ cup of cooked beans and peas (garbanzo, torres, kidney, white, split, black-eyed)    · Meat and other protein sources:      ¨ 3 to 4 ounces of any lean meat, fish, or poultry    ¨ ½ cup of tofu or tempeh    ¨ 1 large egg    ¨ 1½ ounces (about 2 tablespoons) of nuts or 2 tablespoons of peanut butter    · Fruit:      ¨ 1 small piece of fresh fruit     ¨ ½ cup of canned or fresh fruit or unsweetened fruit juice    ¨ ¼ cup of dried fruit    · Milk and yogurt:      ¨ 1 cup (8 ounces) of skim or 1% milk    ¨ ¾ cup (6 ounces) of plain, non-fat yogurt  Other guidelines to follow:   · Limit salt and sugar  Choose and prepare foods and drinks with less salt and added sugars  Use the nutrition information on food labels to help you make healthy choices  The percent daily value listed on the food label tells you whether a food is low or high in certain nutrients  A percent daily value of 5% or less means that the food is low in a nutrient  A percent daily value of 20% or more means that the food is high in a nutrient  · Choose healthy fats  Choose healthy fats such as polyunsaturated and monounsaturated fats in place of unhealthy fats  Healthy fats are found in vegetable oils such as soybean, corn, canola, olive, and sunflower oil  Unhealthy fats are saturated fats, trans fats, and cholesterol  Unhealthy fats are found in shortening, butter, stick margarine, and animal fat  · Ask your healthcare provider if alcohol is safe for you  and how much is safe for you  If you choose to drink alcohol, drink it with meals  When you drink alcohol on an empty stomach, your blood sugar may fall to a low level  © 2017 [employer] Information is for End User's use only and may not be sold, redistributed or otherwise used for commercial purposes   All illustrations and images included in CareNotes® are the copyrighted property of [employer]  or [employer] H[employer]  The above information is an  only  It is not intended as medical advice for individual conditions or treatments  Talk to your doctor, nurse or pharmacist before following any medical regimen to see if it is safe and effective for you  Hypoglycemia in a Person with Diabetes   WHAT YOU NEED TO KNOW:   Hypoglycemia is a serious condition that happens when your blood glucose (sugar) level drops too low  The blood sugar level is usually too high in a person with diabetes, but the level can also drop too low  It is important to follow your diabetes management plan to keep your blood sugar level steady  DISCHARGE INSTRUCTIONS:   Call 911 for any of the following:   · You have a seizure or pass out  · You feel you are going to pass out  · You have trouble thinking clearly  Seek care immediately if:  · Your blood sugar is less than 50 mg/dL and does not respond to treatment  Contact your healthcare provider if:   · You have had symptoms of low blood sugar several times  · You have questions about the amount of insulin or diabetes medicine you are taking  · You have questions or concerns about your condition or care  Medicines:   · Insulin or diabetes medicine  help to keep your blood sugar under control  · Glucagon  may be needed if you have severe hypoglycemia  · Take your medicine as directed  Contact your healthcare provider if you think your medicine is not helping or if you have side effects  Tell him or her if you are allergic to any medicine  Keep a list of the medicines, vitamins, and herbs you take  Include the amounts, and when and why you take them  Bring the list or the pill bottles to follow-up visits  Carry your medicine list with you in case of an emergency  Follow up with your healthcare provider as directed:   You may need dose changes to your insulin or oral diabetes medicine if you have hypoglycemia  Write down your questions so you remember to ask them during your visits  Manage hypoglycemia:   · Check your blood sugar level right away if you have symptoms of hypoglycemia  Hypoglycemia is usually 70 mg/dL or below  Ask your healthcare provider what blood sugar level is too low for you  · If your blood sugar level is too low, eat or drink 15 grams of fast-acting carbohydrate  Examples of this amount of fast-acting carbohydrate are 4 ounces (½ cup) of fruit juice or 4 ounces of regular soda  Other examples are 2 tablespoons of raisins or 3 to 4 glucose tablets  Check your blood sugar level 15 minutes later  If the level is still low (less than 100 mg/dL), have another 15 grams of carbohydrate  When the level returns to 100 mg/dL, eat a snack or meal that contains carbohydrates  This will help prevent another drop in blood sugar  Always carefully follow your healthcare provider's instructions on how to treat low blood sugar levels  · Always carry a source of fast-acting carbohydrate  If you have symptoms of hypoglycemia and you do not have a blood glucose meter, have a source of fast-acting carbohydrate anyway  Avoid carbohydrate foods that are high in fat  The fat content may make it take longer to increase your blood sugar level  Ask your healthcare provider if you should carry a glucagon kit  Glucagon is a medicine that is injected when you develop severe hypoglycemia and become unconscious  Check the expiration date every month and replace it before it expires  · Teach others how to help you if you have symptoms of hypoglycemia  Tell them about the symptoms of hypoglycemia  Ask them to give you a source of fast-acting carbohydrate if you cannot get it yourself  Ask them to give you a glucagon injection if you have symptoms of hypoglycemia and you become unconscious or have a seizure  Ask them to call 911    This is an emergency  Tell them never to try to make you swallow anything if you faint or have a seizure  · Wear medical alert jewelry  or carry a card that says you have diabetes  Ask where to get these items  Prevent hypoglycemia:   · Take diabetes medicine as directed  Take your medicine at the right time and in the right amount  Your healthcare provider may change your blood sugar goals if you get hypoglycemia often  · Eat regular meals and snacks  Talk to your dietitian or healthcare provider about a meal plan that is right for you  Do not skip meals  · Check your blood sugar level as directed  Ask your healthcare provider what your blood sugar levels should be before and after you eat  Ask when and how often to check your blood sugar level  You may need to check at least 3 times each day  Record your blood sugar level results and take the record with you when you see your healthcare provider  Your provider may use the record to make changes to your medicine, food, or exercise schedules  · Check your blood sugar level before you exercise  Exercise can decrease your blood sugar level  If your blood sugar level is less than 100 mg/dL, have a carbohydrate snack  Examples are 4 to 6 crackers, ½ banana, 8 ounces (1 cup) of nonfat or 1% milk, or 4 ounces (½ cup) of juice  If you will exercise for more than 1 hour, you may need to check your blood sugar level every 30 minutes  Your healthcare provider may also recommend that you check your blood sugar level after exercise  · Be aware of how alcohol affects your blood sugar level  Alcohol can cause your blood sugar level to drop for up to 12 hours after drinking  Ask your healthcare provider if alcohol is safe for you  If you drink alcohol, always have a snack or meal at the same time  Women should limit alcohol to 1 drink a day  Men should limit alcohol to 2 drinks a day   A drink of alcohol is 12 ounces of beer, 5 ounces of wine, or 1½ ounces of gwendolynor  © 2017 [facility] [facility] Information is for End User's use only and may not be sold, redistributed or otherwise used for commercial purposes  All illustrations and images included in CareNotes® are the copyrighted property of [employer]  or [employer] H[employer]  The above information is an  only  It is not intended as medical advice for individual conditions or treatments  Talk to your doctor, nurse or pharmacist before following any medical regimen to see if it is safe and effective for you  How to Check Your Blood Sugar   WHAT YOU NEED TO KNOW:   High blood sugar levels increase your risk for heart attack, stroke, eye problems, and kidney problems  You can decrease your risk by controlling your blood sugar levels  Low blood sugar levels can also lead to serious health problems and must be treated right away  Check your blood sugar to help you learn how food, exercise, stress, and medicines affect your levels  Keep a record of your blood sugar levels  It can be used to adjust your meal plan, exercise routine, insulin doses, or diabetes medicine if needed  DISCHARGE INSTRUCTIONS:   How to check your blood sugar level:   · Check your blood sugar level with a glucose meter  This device uses a small drop of blood to measure your blood sugar level  Some glucose meters measure a drop of blood taken from your finger using a special lancet device  Other meters will also measure a drop of blood taken from your thigh, forearm, or the palm of your hand  · Blood sugar levels change quickly after meals, after you take insulin, during exercise, and when you feel stressed or ill  It is best to use blood from your finger to check your blood sugar level during these times  Your healthcare provider will teach you how to use a glucose meter to check your blood sugar level   Ask your healthcare provider for more information about taking blood samples from areas other than your finger  When and how often to check your blood sugar level:  Ask your healthcare provider when and how often you should check your blood sugar levels  If you check your blood sugar level before a meal , it should be between 80 and 130 mg/dL  If you check your blood sugar level 2 hours after a meal , it should be less than 180 mg/dL  Ask your healthcare provider if these are good goals for you  Blood sugar levels need to be checked more often when you are sick, there is a change to your medicine, or if you change your daily routine  Test your blood sugar level if you feel like it may be too high (hyperglycemia) or too low (hypoglycemia)  Keep a record of your blood sugar levels:  Write down your blood sugar level each time you test it  Write down the date, the time of the test (including if it was before or after a meal), and the result  Write down the time you took your insulin or diabetes pills  Record the type and amount of insulin or diabetes medicine you took  Write comments about anything that may have made your blood sugar level go up or down  Your blood sugar level can be affected by exercise, eating more or less than usual, or stress  Bring this record with you to your follow-up visits  How to care for your glucose meter and test strips:  Ask your healthcare provider how and how often to check the accuracy of your meter  You may need to do the following:  · Store your equipment properly  Keep the test strips away from heat, cold, and moisture  Do not take a test strip out of the container until you are ready to use it  Put the lid back tightly on the container  Do not use test strips that are damaged, wet, or bent  · Check the expiration date  on the test strip container to be sure the test strips have not   Your blood sugar readings may be wrong if you use  test strips  Use only the type of glucose test strips that work with your glucose meter    Wear medical alert identification:  Wear medical alert jewelry or carry a card that says you have diabetes  Ask your healthcare provider where to get these items  Follow up with your healthcare provider as directed:  Write down your questions so you remember to ask them during your visits  © 2017 [employer] [employer] Information is for End User's use only and may not be sold, redistributed or otherwise used for commercial purposes  All illustrations and images included in CareNotes® are the copyrighted property of [employer]  or [employer] H[employer]  The above information is an  only  It is not intended as medical advice for individual conditions or treatments  Talk to your doctor, nurse or pharmacist before following any medical regimen to see if it is safe and effective for you  Diabetes and Your Skin   WHAT YOU NEED TO KNOW:   Diabetes can affect every part of your body, including your skin  Diabetes that is not well controlled can damage blood vessels and nerves  Damage to blood vessels can make it hard for blood to flow to tissues and organs  A lack of blood flow to your skin can cause ulcers that are difficult to heal  Skin ulcers are also called sores  People with diabetes can also have more bacterial skin infections than other people  Most skin conditions can be prevented with good blood sugar control  Skin sores can be hard to heal, or become life or limb-threatening, if not treated early  DISCHARGE INSTRUCTIONS:   Contact your healthcare provider if:   · You get a severe burn or cut  · You have a sore that is painful, warm to the touch, or has redness around it  · Your sore does not get better or seems to get worse  · You have a fever  · Your blood sugar levels continue to be higher than they should be  Prevent skin sores:   · Keep your blood sugars within target range  Your healthcare provider will tell you what your blood sugar levels should be   High blood sugar levels increase your risk for skin infections and poor wound healing  · Keep your skin clean  Do not take hot baths or showers  They can cause your skin to get dry  Do not take a bubble bath if you have dry skin  Use moisturizing soaps and lotion after baths or showers  Do not put lotion between your toes  · Keep your skin from becoming too dry,  especially in cold, dry weather  When you scratch dry, itchy skin, you can cause your skin to be open to infection  Bathe less during cold weather and use lotion to moisturize  Use a humidifier to keep air in your home from being dry  · Keep areas where skin touches skin dry  Use talcum powder in areas such as armpits and groin  You may also need it under your breasts, and between your toes  Moisture in these areas can cause a fungal infection  · Treat cuts immediately  Clean minor cuts with soap and water  Cover them with sterile gauze  © 2017 [employer] [employer] Information is for End User's use only and may not be sold, redistributed or otherwise used for commercial purposes  All illustrations and images included in CareNotes® are the copyrighted property of [employer]  or [employer]  The above information is an  only  It is not intended as medical advice for individual conditions or treatments  Talk to your doctor, nurse or pharmacist before following any medical regimen to see if it is safe and effective for you

## 2019-12-22 NOTE — SOCIAL WORK
CM informed by Obey, pt is unable to afford cost of meds for d/c today  Per Eric Stamp, scripts for pt were sent to [facility] and would cost $104 00  Pt unable to afford cost until he gets paid on Thursday  CM spoke with Sandra Rose M -manager CM to advise of same--CM obtained approval from Sandra Rose to have 4-day supply of meds covered to be filled at [facility] prior to d/c  Cost of meds is $953 90  Pt aware glucometer, test strips, and lancets already sent to [address] [place] [address] (approx $20 00) and he will   Pt states he has PCP and will f/u with provider as needed  Meds delivered to pt's bedside

## 2019-12-22 NOTE — DISCHARGE SUMMARY
Discharge Summary - [facility] Internal Medicine    Patient Information: Christoph English 38 y o  male MRN: [id number]  Unit/Bed#: [facility] 579-63 Encounter: [id number]    Discharging Physician / Practitioner: Otf Perla  PCP: Zeny Galeas DO  Admission Date: 12/18/2019  Discharge Date: 12/22/19    Disposition:     Home    Reason for Admission: chest pain     Discharge Diagnoses:     Principal Problem:    NSTEMI (non-ST elevated myocardial infarction) [facility])  Active Problems:    Type 2 diabetes mellitus without complication, without long-term current use of insulin ([facility] [address] [facility] )    Dyslipidemia    Alcohol-induced chronic pancreatitis ([facility] )    Essential hypertension    Hypokalemia    Gastroesophageal reflux disease with esophagitis    Portal vein thrombosis  Resolved Problems:    * No resolved hospital problems  *      Consultations During Hospital Stay:  · Dr Josef Lara endocrinology  · Dr Vimal Rivera cardiology     Procedures Performed:     · Chest xray: No acute cardiopulmonary disease    Cardiac catheterizations on 12/18/2019:CORONARY CIRCULATION:  Mid LAD: There was a discrete 99 % stenosis  There was GM grade 2 flow through the vessel (partial perfusion)  This lesion is a likely culprit for the patient's recent myocardial infarction  An intervention was performed  1st obtuse marginal: There was a discrete 50 % stenosis  It appears amenable to percutaneous intervention    1ST LESION INTERVENTIONS:  A successful balloon angioplasty with stent procedure was performed on the 99 % lesion in the mid LAD  Following intervention there was an excellent angiographic appearance with a 0 % residual stenosis  A Orsiro Rx 4 0 x 18mm drug-eluting stent was placed across the lesion and deployed at a maximum inflation pressure of 9 aris  Echocardiogram on 12/18/2019 demonstrates:  SUMMARY  LEFT VENTRICLE:  Systolic function was mildly reduced  Ejection fraction was estimated to be 45 %    There was dyskinesis of the mid-distal anteroseptal wall(s)  There was hypokinesis of the mid anteroseptal, mid inferoseptal, apical inferior, apical septal, and apical wall(s)  Doppler parameters were consistent with abnormal left ventricular relaxation (grade 1 diastolic dysfunction)  There was a false tendon within the ventricle, towards the apex    RIGHT VENTRICLE:  The size was normal   Systolic function was normal    MITRAL VALVE:  There was trace regurgitation    PULMONIC VALVE:  There was trace regurgitation    IVC, HEPATIC VEINS:  · Respirophasic changes were blunted (less than 50% variation)  · a1c 13 3  · Cholesterol 331, triglyceride 841, hdl 37, ldl 128  · Troponin: 0 79, 2 84, 6 45  · ekg: Normal sinus rhythm  Nonspecific T wave abnormality  Abnormal ECG  When compared with ECG of 03-OCT-2017 00:29,  No significant change was found  · Confirmed by Missy Rudd ([id number]) on 12/18/2019 8:57:42 AM    Significant Findings / Test Results:     · See above     Incidental Findings:   · none    Test Results Pending at Discharge (will require follow up):   · none     Outpatient Tests Requested:  · Follow up with pcp in one week  · Please call to schedule follow up with endocrinology       Complications:  None     Hospital Course:     Richar Hernandez is a 38 y o  male patient who originally presented to the hospital on 12/18/2019 due to chest pain  Patient reports that he started work at 7:30 p m  Night prior to admission he has a known history of type 2 diabetes, hypertension, alcohol use, pancreatitis and pancreatic pseudocyst as well as hepatic vein thrombosis  Patient is a work at [employer]  He reported that his chest pain was central nonradiating  The patient also reported that the pain began to improve after taking a break but eventually got worse as the night went on  He did complain along with chest pain of shortness of breath, diaphoresis and nausea    Patient has no history of MI or CVA in the past   Patient also has to his insurance plan and affordability patient is unable to manage on that medication  This was transitioned with a Plavix load of 300 mg on 12/22 and patient will continue Plavix at 75 mg p  O  Daily starting on 12/23/2019  Noted above is patient's A1c of 13 he will need to follow with Endocrinology to follow up on diabetic management as well as ongoing hyperlipidemia  Post procedure patient was noted to have a right wrist hematoma that is improved now resolved  Patient was initiated on metoprolol, lisinopril to maintain goal of less than 130/80 blood pressure is lipid panel was elevated patient will start fenofibrate 145 mg daily in addition to atorvastatin 40 mg at HS  Extensive education has been given to the per patient  He is stating that he is driven to improve his health  He believes that he could get his blood sugars under control as he drinks up to 3 L of soda daily and just taking that out of the equation he feels this will improve significantly  I have spent much time with the patient in regards to education provided with information calling his pharmacy so that patient will be able to manage in afford his medications at discharge  He understands he needs to follow with Endocrinology, PCP and Cardiology as placed in his discharge instructions he does have difficulty understanding that he needs to maintain medications he hopes in the future to come off of insulin and manage on oral hypoglycemics this can be discussed with Endocrinology    Condition at Discharge: fair     Discharge Day Visit / Exam:     Subjective:  Pt  Is doing well no large urination  No numbness tingling no sob a little bit of a cough  No nausea vomiting or diarrhea  Patient reports he has a mild headache this morning now starting with a subtle cough  Patient is afebrile no fever chills  No throat erythema noted  Patient denies dizziness lightheadedness    Vitals: Blood Pressure: 139/97 (12/22/19 0655)  Pulse: 77 (12/22/19 9888)  Temperature: 97 8 °F (36 6 °C) (12/22/19 0655)  Temp Source: Oral (12/22/19 0655)  Respirations: 18 (12/22/19 0655)  Height: 5' 10" (177 8 cm) (12/18/19 0311)  Weight - Scale: 76 7 kg (169 lb 3 2 oz) (12/20/19 0100)  SpO2: 100 % (12/22/19 0655)  Exam:   Physical Exam   Constitutional: He is oriented to person, place, and time  No distress  HENT:   Head: Normocephalic and atraumatic  Eyes: Conjunctivae are normal  Right eye exhibits no discharge  Left eye exhibits no discharge  No scleral icterus  Neck: No tracheal deviation present  No thyromegaly present  Cardiovascular: Normal rate  Exam reveals no gallop and no friction rub  No murmur heard  Pulmonary/Chest: Effort normal  No stridor  No respiratory distress  He has no wheezes  He has no rales  He exhibits no tenderness  Abdominal: Soft  He exhibits no distension and no mass  There is no tenderness  There is no rebound and no guarding  No hernia  Musculoskeletal: He exhibits no edema, tenderness or deformity  Lymphadenopathy:     He has no cervical adenopathy  Neurological: He is oriented to person, place, and time  Skin: No rash noted  He is not diaphoretic  No erythema  No pallor  Psychiatric: He has a normal mood and affect  Discussion with Family: none at bedside     Discharge instructions/Information to patient and family:   See after visit summary for information provided to patient and family  Provisions for Follow-Up Care:  See after visit summary for information related to follow-up care and any pertinent home health orders  Planned Readmission: no plan for readmission      Discharge Statement:  I spent 120  minutes discharging the patient ongoing diabetic education, called pharmacy to make sure insulin covered had to make changes based on pts ability to pay for medications  Discussed with nursing education on how to do insulin administration   This time was spent on the day of discharge   I had direct contact with the patient on the day of discharge  Greater than 50% of the total time was spent examining patient, answering all patient questions, arranging and discussing plan of care with patient as well as directly providing post-discharge instructions  Additional time then spent on discharge activities  Discharge Medications:  See after visit summary for reconciled discharge medications provided to patient and family        ** Please Note: This note has been constructed using a voice recognition system **

## 2019-12-26 RX ORDER — METOPROLOL SUCCINATE 25 MG/1
25 TABLET, EXTENDED RELEASE ORAL DAILY
Qty: 30 TABLET | Refills: 0 | Status: SHIPPED | OUTPATIENT
Start: 2019-12-26 | End: 2020-01-06 | Stop reason: SDUPTHER

## 2019-12-26 RX ORDER — FENOFIBRATE 145 MG/1
145 TABLET, COATED ORAL DAILY
Qty: 30 TABLET | Refills: 0 | Status: SHIPPED | OUTPATIENT
Start: 2019-12-26 | End: 2020-01-06 | Stop reason: SDUPTHER

## 2019-12-26 RX ORDER — ATORVASTATIN CALCIUM 80 MG/1
80 TABLET, FILM COATED ORAL
Qty: 30 TABLET | Refills: 0 | Status: SHIPPED | OUTPATIENT
Start: 2019-12-26 | End: 2020-01-06 | Stop reason: SDUPTHER

## 2019-12-26 RX ORDER — ASPIRIN 81 MG/1
81 TABLET, CHEWABLE ORAL DAILY
Qty: 90 EACH | Refills: 0 | Status: SHIPPED | OUTPATIENT
Start: 2019-12-26 | End: 2021-09-21

## 2019-12-26 RX ORDER — CLOPIDOGREL BISULFATE 75 MG/1
75 TABLET ORAL DAILY
Qty: 30 TABLET | Refills: 0 | Status: SHIPPED | OUTPATIENT
Start: 2019-12-26 | End: 2020-01-06 | Stop reason: SDUPTHER

## 2019-12-26 RX ORDER — LISINOPRIL 20 MG/1
20 TABLET ORAL DAILY
Qty: 30 TABLET | Refills: 0 | Status: SHIPPED | OUTPATIENT
Start: 2019-12-26 | End: 2020-01-06 | Stop reason: SDUPTHER

## 2019-12-26 RX ORDER — NITROGLYCERIN 0.4 MG/1
0.4 TABLET SUBLINGUAL
Qty: 20 TABLET | Refills: 0 | Status: SHIPPED | OUTPATIENT
Start: 2019-12-26 | End: 2020-01-25
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
## 2019-12-26 NOTE — QUICK NOTE
Called by forrest  Pt had gone over to [address] [facility] to obtain his medications for the month upon arrival they were not there  At day of discharge I had originally sent all of the patients medications [facility] discussed at length patients medications in detail as pt was having problems with affordability  I had sent over medications and pt had also obtained a discount from [employer] for his insulin  Glucometer, strips and lancets were called over verbally as well cheaper per pharmacist to obtain over the counter   The patient then reported that he was unable to pay the copay of $109 before Thursday as he never got paid till then  I informed the patient that he had to be discharged with his medications  After this occurred I was informed by Amalia Valdes that he would be charitied four days of medications until he got paid today and was able to fill his medications  [facility] until he was able to  the meds I had already sent to [facility]  Pt then received as far as I know four days of medications at discharge delivered per Saul Erin to his room prior to discharge  Pt had just had a stent placed s/p nstemi with severe uncontrolled dm type 2 , pt could not be discharged without his medications  Today I am told medications not at [facility] when pt went to pick them up  From what the [facility] pharmacist is saying [facility] had called them and told them to cancel the medications, this was done reason not there when pt went today  I have gone in the system now changed the discharge medications to new scripts from now for a month prescriptions  [facility] aware to fill  However, told this may change his coverage  Unclear what copay will be

## 2019-12-27 NOTE — UTILIZATION REVIEW
Notification of Discharge  This is a Notification of Discharge from our facility [address]  Please be advised that this patient has been discharge from our facility  Below you will find the admission and discharge date and time including the patients disposition  PRESENTATION DATE: 12/18/2019  3:09 AM  OBS ADMISSION DATE: N/A  IP ADMISSION DATE: 12/18/19 0414   DISCHARGE DATE: 12/22/2019  4:03 PM  DISPOSITION: Home/Self Care Home/Self Care   Admission Orders listed below:  Admission Orders (From admission, onward)     Ordered        12/18/19 0415  Inpatient Admission (expected length of stay for this patient Order details is greater than two midnights)  Once                 Please contact the UR Department if additional information is required to close this patient's authorization/case  [address] Utilization Review Department  Main: [phone] x carefully listen to the prompts  All voicemails are confidential   [email or web address]  Send all requests for admission clinical reviews, approved or denied determinations and any other requests to dedicated fax number below belonging to the campus where the patient is receiving treatment   List of dedicated fax numbers:  [address] [place] [address] DENIALS (Administrative/Medical Necessity) [phone]   [address]  (Maternity/NICU/Pediatrics) [phone]   Qian Stapleton [phone]   Matilde Germain [phone]   Gabby Mcghee [phone]   Merlinda [place] [address] [phone]   [facility]  [phone]   [address] [facility]  [address] [facility] [address] [facility] [phone]

## 2020-01-06 ENCOUNTER — DOCUMENTATION (OUTPATIENT)
Dept: CARDIOLOGY CLINIC | Facility: CLINIC | Age: 45
End: 2020-01-06

## 2020-01-06 ENCOUNTER — OFFICE VISIT (OUTPATIENT)
Dept: CARDIOLOGY CLINIC | Facility: CLINIC | Age: 45
End: 2020-01-06
Payer: COMMERCIAL

## 2020-01-06 VITALS
HEART RATE: 84 BPM | DIASTOLIC BLOOD PRESSURE: 84 MMHG | SYSTOLIC BLOOD PRESSURE: 122 MMHG | BODY MASS INDEX: 27.35 KG/M2 | WEIGHT: 191 LBS | HEIGHT: 70 IN

## 2020-01-06 DIAGNOSIS — I10 ESSENTIAL HYPERTENSION: ICD-10-CM

## 2020-01-06 DIAGNOSIS — I21.4 NSTEMI (NON-ST ELEVATED MYOCARDIAL INFARCTION) (HCC): ICD-10-CM

## 2020-01-06 DIAGNOSIS — E78.5 DYSLIPIDEMIA: ICD-10-CM

## 2020-01-06 DIAGNOSIS — E11.9 TYPE 2 DIABETES MELLITUS WITHOUT COMPLICATION, WITHOUT LONG-TERM CURRENT USE OF INSULIN (HCC): ICD-10-CM

## 2020-01-06 DIAGNOSIS — K86.0 ALCOHOL-INDUCED CHRONIC PANCREATITIS (HCC): ICD-10-CM

## 2020-01-06 DIAGNOSIS — I81 PORTAL VEIN THROMBOSIS: ICD-10-CM

## 2020-01-06 DIAGNOSIS — I25.10 CORONARY ARTERY DISEASE INVOLVING NATIVE CORONARY ARTERY OF NATIVE HEART WITHOUT ANGINA PECTORIS: Primary | ICD-10-CM

## 2020-01-06 PROCEDURE — 3074F SYST BP LT 130 MM HG: CPT | Performed by: INTERNAL MEDICINE

## 2020-01-06 PROCEDURE — 99214 OFFICE O/P EST MOD 30 MIN: CPT | Performed by: INTERNAL MEDICINE

## 2020-01-06 PROCEDURE — 3079F DIAST BP 80-89 MM HG: CPT | Performed by: INTERNAL MEDICINE

## 2020-01-06 RX ORDER — ATORVASTATIN CALCIUM 80 MG/1
80 TABLET, FILM COATED ORAL
Qty: 90 TABLET | Refills: 3 | Status: SHIPPED | OUTPATIENT
Start: 2020-01-06 | End: 2021-01-28

## 2020-01-06 RX ORDER — CLOPIDOGREL BISULFATE 75 MG/1
75 TABLET ORAL DAILY
Qty: 90 TABLET | Refills: 3 | Status: SHIPPED | OUTPATIENT
Start: 2020-01-06 | End: 2021-01-28

## 2020-01-06 RX ORDER — METOPROLOL SUCCINATE 25 MG/1
25 TABLET, EXTENDED RELEASE ORAL DAILY
Qty: 90 TABLET | Refills: 3 | Status: SHIPPED | OUTPATIENT
Start: 2020-01-06 | End: 2021-01-28

## 2020-01-06 RX ORDER — FENOFIBRATE 145 MG/1
145 TABLET, COATED ORAL DAILY
Qty: 90 TABLET | Refills: 3 | Status: SHIPPED | OUTPATIENT
Start: 2020-01-06 | End: 2021-01-28

## 2020-01-06 RX ORDER — LISINOPRIL 20 MG/1
20 TABLET ORAL DAILY
Qty: 90 TABLET | Refills: 3 | Status: SHIPPED | OUTPATIENT
Start: 2020-01-06 | End: 2021-01-28

## 2020-01-06 NOTE — LETTER
January 6, 2020     Patient: James Barahona   YOB: 1975   Date of Visit: 1/6/2020       To Whom it May Concern:    Nehemiah Suarez is under my professional care  He was seen in my office on 1/6/2020  He may return to work on 1/7/202 without any limitations  If you have any questions or concerns, please don't hesitate to call           Sincerely,          Anh Garcias MD        CC: No Recipients

## 2020-01-06 NOTE — PROGRESS NOTES
Cardiology Consultation     Lex Liu  [id number]  1975  HEART & VASCULAR [facility] [place]  [address] [facility] [address]      1  Coronary artery disease involving native coronary artery of native heart without angina pectoris     2  Dyslipidemia     3  Type 2 diabetes mellitus without complication, without long-term current use of insulin ([facility] [address] [address] )     4  Portal vein thrombosis     5  NSTEMI (non-ST elevated myocardial infarction) (HCC)  atorvastatin (LIPITOR) 80 mg tablet    lisinopril (ZESTRIL) 20 mg tablet    metoprolol succinate (TOPROL-XL) 25 mg 24 hr tablet    fenofibrate (TRICOR) 145 mg tablet   6  Alcohol-induced chronic pancreatitis (HCC)  clopidogrel (PLAVIX) 75 mg tablet   7  Essential hypertension         Discussion/Summary:    1  CAD: s/p MARY to the LAD in the setting of an NSTEMI  On DAPT  BP controlled  See below re: lipid lowering therapy  To start cardiac rehab  He can return to work, will be able to attend on Fridays  Can be at gym on the other days doing his own regimen, guided by rehab team     2  HTN: BP controlled  3  HL: On 80mg atorvastatin  Also on TriCor with very high TG  Once DM better controlled, may be improved and we could try coming off this fibrate in the future depending on his numbers  Will need lifelong high intensity statin  4  LV dysfunction: was mild in the setting of MI  EF was 45%  No evidence of volume overload currently  Continue toprol and lisinopril for now  He was advised to remain off work until follow up with cardiology, but he is now able to return to work without restrictions  Paperwork regarding his inability to work up until tomorrow was completed  History of Present Illness:    42-year-old man  I am seeing the 1st time today, but he was seen by my partners during his hospitalization in December of 2019      He reports a history of diabetes and hypertension, but was not  On medication prior to his recent hospitalization  He had been prescribed medications previously, but had not had regular follow-up with his primary care physician so soon that he did not need to be taking his medications  He presented with chest pain  He works for [employer]     he had a non ST elevation MI, peak troponin was about 7  Ejection fraction in the range of 45%  Underwent cardiac catheterization which showed disease in the LAD system any at a drug-eluting stent placed  Started on medications for diabetes  Started on dual antiplatelet therapy with aspirin and Plavix  In addition to 80 mg of atorvastatin, given the markedly elevated triglyceride, he was put on fenofibrate as well  Since discharge, he has seen his primary care physician  His cath was done from a right radial approach  The site has healed well  He denies any problems with the blood thinners are his other medications  Blood pressure is much better controlled now  He already has plans to get follow-up blood work done with his primary care physician later in Feb     Previously with pancreatitis, but this was in the setting of alcohol  He has been abstinent from drinking  Patient Active Problem List   Diagnosis    Pancreatic pseudocyst    Alcohol-induced chronic pancreatitis (HCC)    Liver mass    Generalized abdominal pain    Eosinophilic esophagitis    Hypokalemia    NSTEMI (non-ST elevated myocardial infarction) ([facility] [address] )    Gastroesophageal reflux disease with esophagitis    Type 2 diabetes mellitus without complication, without long-term current use of insulin ([facility] )    Portal vein thrombosis    Dyslipidemia     Past Medical History:   Diagnosis Date    Diabetes mellitus ([facility] )     pre-diabetic     Hypertension     Pancreatitis     Portal vein thrombosis      Social History     Tobacco Use    Smoking status: Never Smoker    Smokeless tobacco: Never Used   Substance Use Topics    Alcohol use:  Yes Comment: socially -rarely    Drug use: No      No family history on file  Past Surgical History:   Procedure Laterality Date    ABDOMINAL SURGERY      CHOLECYSTECTOMY      OR EDG US EXAM SURGICAL ALTER STOM DUODENUM/JEJUNUM N/A 7/24/2017    Procedure: LINEAR ENDOSCOPIC U/S WITH AXIOS STENT;  Surgeon: Kang Portillo MD;  [facility]: [facility]; Service: Gastroenterology    OR EDG US EXAM SURGICAL ALTER STOM DUODENUM/JEJUNUM N/A 9/14/2017    Procedure: LINEAR ENDOSCOPIC U/S POSSIBLE AXIOS;  Surgeon: Kang Portillo MD;  Location: [facility];   Service: Gastroenterology    WISDOM TOOTH EXTRACTION      20 years ago       Current Outpatient Medications:     aspirin 81 mg chewable tablet, Chew 1 tablet (81 mg total) daily, Disp: 90 each, Rfl: 0    atorvastatin (LIPITOR) 80 mg tablet, Take 1 tablet (80 mg total) by mouth daily after dinner, Disp: 90 tablet, Rfl: 3    clopidogrel (PLAVIX) 75 mg tablet, Take 1 tablet (75 mg total) by mouth daily, Disp: 90 tablet, Rfl: 3    Empagliflozin (JARDIANCE) 25 MG TABS, Take 1 tablet (25 mg total) by mouth every morning, Disp: 30 tablet, Rfl: 0    fenofibrate (TRICOR) 145 mg tablet, Take 1 tablet (145 mg total) by mouth daily, Disp: 90 tablet, Rfl: 3    insulin glargine (LANTUS SOLOSTAR) 100 units/mL injection pen, Inject 40 Units under the skin daily, Disp: 5 pen, Rfl: 0    insuln lispro (HUMALOG KWIKPEN) 200 units/mL CONCENTRATED U-200 injection pen, Inject 12 Units under the skin 3 (three) times a day with meals, Disp: 2 pen, Rfl: 0    lisinopril (ZESTRIL) 20 mg tablet, Take 1 tablet (20 mg total) by mouth daily, Disp: 90 tablet, Rfl: 3    metoprolol succinate (TOPROL-XL) 25 mg 24 hr tablet, Take 1 tablet (25 mg total) by mouth daily, Disp: 90 tablet, Rfl: 3    Multiple Vitamins-Minerals (MULTIVITAMIN WITH MINERALS) tablet, Take 1 tablet by mouth daily, Disp: , Rfl:     nitroglycerin (NITROSTAT) 0 4 mg SL tablet, Place 1 tablet (0 4 mg total) under the tongue every 5 (five) minutes as needed for chest pain, Disp: 20 tablet, Rfl: 0    Omega-3 Fatty Acids (FISH OIL) 1,000 mg, Take 1,000 mg by mouth daily, Disp: , Rfl:   No Known Allergies    Vitals:    01/06/20 1358   BP: 122/84   BP Location: Right arm   Patient Position: Sitting   Cuff Size: Standard   Pulse: 84   Weight: 86 6 kg (191 lb)   Height: 5' 10" (1 778 m)     Vitals:    01/06/20 1358   Weight: 86 6 kg (191 lb)      Height: 5' 10" (177 8 cm)   Body mass index is 27 41 kg/m²  Physical Exam:   GENERAL: Alert, well appearing, and in no distress  HEENT:  PERRL, EOMI, no scleral icterus, no conjunctival pallor  NECK:  Supple, No elevated JVP, no thyromegaly, no carotid bruits  HEART:  Regular rate and rhythm, normal S1/S2, no S3/S4, no murmur or rub  LUNGS:  Clear to auscultation bilaterally  ABDOMEN:  Soft, non-tender, positive bowel sounds, no rebound or guarding  EXTREMITIES:  No edema  VASCULAR:  Normal pedal pulses   NEURO: No focal deficits,  SKIN: Normal without suspicious lesions on exposed skin      ROS:  Except as noted in HPI, is otherwise reviewed in detail and a 12 point review of systems is negative      Labs:  Lab Results   Component Value Date     06/26/2015    K 3 6 12/21/2019     (H) 12/21/2019    CREATININE 0 97 12/21/2019    BUN 19 12/21/2019    CO2 25 12/21/2019    ALT 41 12/18/2019    AST 27 12/18/2019    INR 0 96 12/18/2019    GLUF 108 (H) 05/15/2017    HGBA1C 13 3 (H) 12/18/2019    WBC 6 02 12/19/2019    HGB 13 7 12/19/2019    HCT 41 7 12/19/2019     12/19/2019       Lab Results   Component Value Date    CHOL 186 12/15/2014     Lab Results   Component Value Date    HDL 37 (L) 12/18/2019    HDL 46 12/15/2014     Lab Results   Component Value Date    LDLCALC  12/18/2019      Comment:      Calculated LDL invalid, triglycerides >400 mg/dl    LDLCALC 114 (H) 12/15/2014     Lab Results   Component Value Date    TRIG 841 (H) 12/18/2019    TRIG 129 12/15/2014       Testing:  Cardiac Cath 12/18/19:  CORONARY CIRCULATION:  Mid LAD: There was a discrete 99 % stenosis  There was GM grade 2 flow through the vessel (partial perfusion)  This lesion is a likely culprit for the patient's recent myocardial infarction  An intervention was performed  1st obtuse marginal: There was a discrete 50 % stenosis  It appears amenable to percutaneous intervention      1ST LESION INTERVENTIONS:  A successful balloon angioplasty with stent procedure was performed on the 99 % lesion in the mid LAD  Following intervention there was an excellent angiographic appearance with a 0 % residual stenosis  A Orsiro Rx 4 0 x 18mm drug-eluting stent was placed across the lesion and deployed at a maximum inflation pressure of 9 aris      Echo 12/18/19:  LEFT VENTRICLE:  Systolic function was mildly reduced  Ejection fraction was estimated to be 45 %  There was dyskinesis of the mid-distal anteroseptal wall(s)  There was hypokinesis of the mid anteroseptal, mid inferoseptal, apical inferior, apical septal, and apical wall(s)  Doppler parameters were consistent with abnormal left ventricular relaxation (grade 1 diastolic dysfunction)  There was a false tendon within the ventricle, towards the apex      RIGHT VENTRICLE:  The size was normal   Systolic function was normal      MITRAL VALVE:  There was trace regurgitation      PULMONIC VALVE:  There was trace regurgitation      IVC, HEPATIC VEINS:  Respirophasic changes were blunted (less than 50% variation)

## 2020-01-21 NOTE — PROGRESS NOTES
Established Patient Progress Note      Chief Complaint   Patient presents with    Diabetes Type 2           History of Present Illness:   James Barahona is a 38 y o  male with a history of HLD and type 2 diabetes with long term use of insulin December 2019  Reports complications of CAD  Last A1C 13 3  Recent hospital admission for chest pain  Has history of CAD and was found to have NSTEMI  Due to this underwent stent placement  Patient also found to have severely uncontrolled diabetes, was not on medications for diabetes at that time  Started on basal/bolus insulin  He did not bring in BG log or meter to today'sd visit  Reports BG have greatly improved  Has cut out all soda and junk food  Has mostly salad and lean meat  Walks daily  Denies recent severe hypoglycemic or severe hyperglycemic episodes  Denies any issues with his current regimen  Home glucose monitoring: are performed regularly    Home blood glucose readings:   Before breakfast: 140s  Before lunch: 140s  Before dinner: 140s  Bedtime: does not check     Current regimen: Lantus 40 units, Humalog 12-12-12, and Jardiance 25 mg   compliant all of the timedenies any side effects from current medications     Hypoglycemic episodes: No never   H/o of hypoglycemia causing hospitalization or Intervention such as glucagon injection or ambulance call No     Last Eye Exam: needs to make appointment   Last Foot Exam: does not see podiatrist     Has hyperlipidemia: Taking Atorvastatin and Fenofibrate     Patient Active Problem List   Diagnosis    Pancreatic pseudocyst    [facility]-induced [facility]    Liver mass    Generalized abdominal pain    Eosinophilic esophagitis    Hypokalemia    NSTEMI (non-ST elevated myocardial infarction) ([address]    Gastroesophageal reflux disease with esophagitis    Type 2 diabetes mellitus without complication, without long-term current use of insulin ([facility] [facility]    Portal vein thrombosis    Dyslipidemia      Past Medical History:   Diagnosis Date    Diabetes mellitus [facility] [facility]     pre-diabetic     Hypertension     Pancreatitis     Portal vein thrombosis       Past Surgical History:   Procedure Laterality Date    ABDOMINAL SURGERY      CHOLECYSTECTOMY      OR EDG US EXAM SURGICAL ALTER STOM DUODENUM/JEJUNUM N/A 7/24/2017    Procedure: LINEAR ENDOSCOPIC U/S WITH AXIOS STENT;  Surgeon: Mile Soares MD;  [facility]; Service: Gastroenterology    OR EDG US EXAM SURGICAL ALTER STOM DUODENUM/JEJUNUM N/A 9/14/2017    Procedure: LINEAR ENDOSCOPIC U/S POSSIBLE AXIOS;  Surgeon: Mile Soares MD;  [facility]; Service: Gastroenterology    WISDOM TOOTH EXTRACTION      20 years ago      History reviewed  No pertinent family history    Social History     Tobacco Use    Smoking status: Never Smoker    Smokeless tobacco: Never Used   Substance Use Topics    Alcohol use: Yes     Comment: socially -rarely     No Known Allergies      Current Outpatient Medications:     aspirin 81 mg chewable tablet, Chew 1 tablet (81 mg total) daily, Disp: 90 each, Rfl: 0    atorvastatin (LIPITOR) 80 mg tablet, Take 1 tablet (80 mg total) by mouth daily after dinner, Disp: 90 tablet, Rfl: 3    clopidogrel (PLAVIX) 75 mg tablet, Take 1 tablet (75 mg total) by mouth daily, Disp: 90 tablet, Rfl: 3    Empagliflozin (JARDIANCE) 25 MG TABS, Take 1 tablet (25 mg total) by mouth every morning, Disp: 30 tablet, Rfl: 0    fenofibrate (TRICOR) 145 mg tablet, Take 1 tablet (145 mg total) by mouth daily, Disp: 90 tablet, Rfl: 3    insulin glargine (LANTUS SOLOSTAR) 100 units/mL injection pen, Inject 40 Units under the skin daily, Disp: 4 pen, Rfl: 3    insuln lispro (HUMALOG KWIKPEN) 200 units/mL CONCENTRATED U-200 injection pen, Inject 12 Units under the skin 3 (three) times a day with meals, Disp: 2 pen, Rfl: 6    lisinopril (ZESTRIL) 20 mg tablet, Take 1 tablet (20 mg total) by mouth daily, Disp: 90 tablet, Rfl: 3    metoprolol succinate (TOPROL-XL) 25 mg 24 hr tablet, Take 1 tablet (25 mg total) by mouth daily, Disp: 90 tablet, Rfl: 3    Multiple Vitamins-Minerals (MULTIVITAMIN WITH MINERALS) tablet, Take 1 tablet by mouth daily, Disp: , Rfl:     nitroglycerin (NITROSTAT) 0 4 mg SL tablet, Place 1 tablet (0 4 mg total) under the tongue every 5 (five) minutes as needed for chest pain, Disp: 20 tablet, Rfl: 0    Omega-3 Fatty Acids (FISH OIL) 1,000 mg, Take 1,000 mg by mouth daily, Disp: , Rfl:     Review of Systems   Constitutional: Negative for activity change, appetite change and fatigue  HENT: Negative for sore throat, trouble swallowing and voice change  Eyes: Negative for visual disturbance  Respiratory: Negative for choking, chest tightness and shortness of breath  Cardiovascular: Negative for chest pain, palpitations and leg swelling  Gastrointestinal: Negative for abdominal pain, constipation and diarrhea  Endocrine: Negative for cold intolerance, heat intolerance, polydipsia, polyphagia and polyuria  Genitourinary: Negative for frequency  Musculoskeletal: Negative for arthralgias and myalgias  Skin: Negative for rash  Neurological: Negative for dizziness and syncope  Hematological: Negative for adenopathy  Psychiatric/Behavioral: Negative for sleep disturbance  All other systems reviewed and are negative  Physical Exam:  Body mass index is 28 73 kg/m²  /86   Pulse 69   Ht 5' 10" (1 778 m)   Wt 90 8 kg (200 lb 3 2 oz)   BMI 28 73 kg/m²    Wt Readings from Last 3 Encounters:   01/24/20 90 8 kg (200 lb 3 2 oz)   01/06/20 86 6 kg (191 lb)   12/20/19 76 7 kg (169 lb 3 2 oz)       Physical Exam   Constitutional: He is oriented to person, place, and time  He appears well-developed and well-nourished  No distress  HENT:   Head: Normocephalic and atraumatic  Mouth/Throat: Oropharynx is clear and moist    Eyes: Pupils are equal, round, and reactive to light   Conjunctivae and EOM are normal    Neck: Normal range of motion  Neck supple  No thyromegaly present  Cardiovascular: Normal rate, regular rhythm and normal heart sounds  Pulses are no weak pulses  No murmur heard  Pulses:       Dorsalis pedis pulses are 2+ on the right side, and 2+ on the left side  Posterior tibial pulses are 2+ on the right side, and 2+ on the left side  Pulmonary/Chest: Effort normal and breath sounds normal  No respiratory distress  He has no wheezes  He has no rales  Abdominal: Soft  Bowel sounds are normal  He exhibits no distension  There is no tenderness  Musculoskeletal: Normal range of motion  He exhibits no edema  Feet:   Right Foot:   Skin Integrity: Negative for ulcer, skin breakdown, erythema, warmth, callus or dry skin  Left Foot:   Skin Integrity: Negative for ulcer, skin breakdown, erythema, warmth, callus or dry skin  Lymphadenopathy:     He has no cervical adenopathy  Neurological: He is alert and oriented to person, place, and time  Skin: Skin is warm and dry  Psychiatric: He has a normal mood and affect  Vitals reviewed  Patient's shoes and socks removed  Right Foot/Ankle   Right Foot Inspection  Skin Exam: skin normal skin not intact, no dry skin, no warmth, no callus, no erythema, no maceration, no abnormal color, no pre-ulcer, no ulcer and no callus                            Sensory       Monofilament testing: intact  Vascular  Capillary refills: < 3 seconds  The right DP pulse is 2+  The right PT pulse is 2+  Left Foot/Ankle  Left Foot Inspection  Skin Exam: skin normalskin not intact, no dry skin, no warmth, no erythema, no maceration, normal color, no pre-ulcer, no ulcer and no callus                                         Sensory       Monofilament: intact  Vascular  Capillary refills: < 3 seconds  The left DP pulse is 2+  The left PT pulse is 2+  Assign Risk Category:  No deformity present; No loss of protective sensation;  No weak pulses       Risk: 0      Labs: Lab Results   Component Value Date    HGBA1C 13 3 (H) 12/18/2019       Lab Results   Component Value Date     06/26/2015    SODIUM 138 12/21/2019    K 3 6 12/21/2019     (H) 12/21/2019    CO2 25 12/21/2019    ANIONGAP 7 06/26/2015    AGAP 4 12/21/2019    BUN 19 12/21/2019    CREATININE 0 97 12/21/2019    GLUC 203 (H) 12/21/2019    GLUF 108 (H) 05/15/2017    CALCIUM 9 5 12/21/2019    AST 27 12/18/2019    ALT 41 12/18/2019    ALKPHOS 169 (H) 12/18/2019    PROT 8 7 (H) 06/26/2015    TP 8 1 12/18/2019    BILITOT 0 26 06/26/2015    TBILI 0 43 12/18/2019    EGFR 94 12/21/2019         Lab Results   Component Value Date    CHOL 186 12/15/2014    HDL 37 (L) 12/18/2019    TRIG 841 (H) 12/18/2019         Impression & Plan:    Problem List Items Addressed This Visit        Endocrine    Type 2 diabetes mellitus without complication, without long-term current use of insulin [facility] - Primary     BG improving per patient  No BG data (BG log/meter) provided at visit  Due to lack of data can not make any changes today  Continue current regimen for now  Instructed to check BG 4x per day and send in BG log in two weeks for review  Will make adjustments at that time  Advised on complications of uncontrolled diabetes including; retinopathy, neuropathy, nephropathy, heart attack, and stroke  Relevant Medications    insuln lispro (HUMALOG KWIKPEN) 200 units/mL CONCENTRATED U-200 injection pen    insulin glargine (LANTUS SOLOSTAR) 100 units/mL injection pen    Other Relevant Orders    Hemoglobin A1C    Comprehensive metabolic panel    Lipid Panel with Direct LDL reflex    Microalbumin / creatinine urine ratio       Cardiovascular and Mediastinum    NSTEMI (non-ST elevated myocardial infarction) ([facility] [address] )     Continue f/u with cardiologist  Will be starting cardiac rehab soon               Other    Dyslipidemia     Continue statin and fenofibrate          Relevant Orders    Lipid Panel with Direct LDL reflex Orders Placed This Encounter   Procedures    Hemoglobin A1C     Standing Status:   Future     Standing Expiration Date:   1/24/2021    Comprehensive metabolic panel     This is a patient instruction: Patient fasting for 8 hours or longer recommended  Standing Status:   Future     Standing Expiration Date:   1/24/2021    Lipid Panel with Direct LDL reflex     This is a patient instruction: This test requires patient fasting for 10-12 hours or longer  Drinking of black coffee or black tea is acceptable  Standing Status:   Future     Standing Expiration Date:   1/24/2021    Microalbumin / creatinine urine ratio     Standing Status:   Future     Standing Expiration Date:   1/24/2021         Discussed with the patient and all questioned fully answered  He will call me if any problems arise  Follow-up appointment in 3 months       Counseled patient on diagnostic results, prognosis, risk and benefit of treatment options, instruction for management, importance of treatment compliance, Risk  factor reduction and impressions      Malorie Hidalgo [address] [facility]

## 2020-01-24 ENCOUNTER — OFFICE VISIT (OUTPATIENT)
Dept: ENDOCRINOLOGY | Facility: CLINIC | Age: 45
End: 2020-01-24
Payer: COMMERCIAL

## 2020-01-24 VITALS
SYSTOLIC BLOOD PRESSURE: 132 MMHG | HEART RATE: 69 BPM | BODY MASS INDEX: 28.66 KG/M2 | WEIGHT: 200.2 LBS | HEIGHT: 70 IN | DIASTOLIC BLOOD PRESSURE: 86 MMHG

## 2020-01-24 DIAGNOSIS — E11.9 TYPE 2 DIABETES MELLITUS WITHOUT COMPLICATION, WITHOUT LONG-TERM CURRENT USE OF INSULIN (HCC): Primary | ICD-10-CM

## 2020-01-24 DIAGNOSIS — I21.4 NSTEMI (NON-ST ELEVATED MYOCARDIAL INFARCTION) (HCC): ICD-10-CM

## 2020-01-24 DIAGNOSIS — E78.5 DYSLIPIDEMIA: ICD-10-CM

## 2020-01-24 PROCEDURE — 99214 OFFICE O/P EST MOD 30 MIN: CPT | Performed by: NURSE PRACTITIONER

## 2020-01-27 DIAGNOSIS — I21.4 NSTEMI (NON-ST ELEVATED MYOCARDIAL INFARCTION) (HCC): ICD-10-CM

## 2020-01-27 DIAGNOSIS — E11.9 TYPE 2 DIABETES MELLITUS WITHOUT COMPLICATION, WITHOUT LONG-TERM CURRENT USE OF INSULIN (HCC): ICD-10-CM

## 2020-02-07 ENCOUNTER — TELEPHONE (OUTPATIENT)
Dept: ENDOCRINOLOGY | Facility: CLINIC | Age: 45
End: 2020-02-07

## 2020-02-07 DIAGNOSIS — E11.9 TYPE 2 DIABETES MELLITUS WITHOUT COMPLICATION, WITHOUT LONG-TERM CURRENT USE OF INSULIN (HCC): Primary | ICD-10-CM

## 2020-02-07 RX ORDER — PEN NEEDLE, DIABETIC 32GX 5/32"
NEEDLE, DISPOSABLE MISCELLANEOUS
COMMUNITY
End: 2020-02-07 | Stop reason: SDUPTHER

## 2020-02-07 RX ORDER — PEN NEEDLE, DIABETIC 32GX 5/32"
NEEDLE, DISPOSABLE MISCELLANEOUS 4 TIMES DAILY
Qty: 360 EACH | Refills: 1 | Status: SHIPPED | OUTPATIENT
Start: 2020-02-07

## 2020-02-07 NOTE — TELEPHONE ENCOUNTER
Patient needs a refill on his insulin needles ( he uses humalog and lantus)  Please send to the [facility] [address] in [place]  His number is [phone]  Thank you

## 2020-03-20 PROBLEM — I25.10 CORONARY ARTERY DISEASE INVOLVING NATIVE CORONARY ARTERY OF NATIVE HEART WITHOUT ANGINA PECTORIS: Status: ACTIVE | Noted: 2019-12-18

## 2020-06-29 DIAGNOSIS — E11.9 TYPE 2 DIABETES MELLITUS WITHOUT COMPLICATION, WITHOUT LONG-TERM CURRENT USE OF INSULIN (HCC): ICD-10-CM

## 2020-06-30 RX ORDER — INSULIN GLARGINE 100 [IU]/ML
INJECTION, SOLUTION SUBCUTANEOUS
Qty: 15 ML | Refills: 3 | Status: SHIPPED | OUTPATIENT
Start: 2020-06-30 | End: 2021-04-01 | Stop reason: ALTCHOICE

## 2020-08-24 DIAGNOSIS — E11.9 TYPE 2 DIABETES MELLITUS WITHOUT COMPLICATION, WITHOUT LONG-TERM CURRENT USE OF INSULIN (HCC): ICD-10-CM

## 2020-08-25 RX ORDER — INSULIN LISPRO 200 [IU]/ML
INJECTION, SOLUTION SUBCUTANEOUS
Qty: 6 ML | Refills: 0 | Status: SHIPPED | OUTPATIENT
Start: 2020-08-25 | End: 2020-09-23

## 2020-09-22 DIAGNOSIS — E11.9 TYPE 2 DIABETES MELLITUS WITHOUT COMPLICATION, WITHOUT LONG-TERM CURRENT USE OF INSULIN (HCC): ICD-10-CM

## 2020-09-23 RX ORDER — INSULIN LISPRO 200 [IU]/ML
INJECTION, SOLUTION SUBCUTANEOUS
Qty: 6 ML | Refills: 2 | Status: SHIPPED | OUTPATIENT
Start: 2020-09-23 | End: 2020-09-24

## 2020-09-24 DIAGNOSIS — E11.9 TYPE 2 DIABETES MELLITUS WITHOUT COMPLICATION, WITHOUT LONG-TERM CURRENT USE OF INSULIN (HCC): ICD-10-CM

## 2020-09-24 RX ORDER — INSULIN LISPRO 200 [IU]/ML
INJECTION, SOLUTION SUBCUTANEOUS
Qty: 6 ML | Refills: 0 | Status: SHIPPED | OUTPATIENT
Start: 2020-09-24 | End: 2021-01-28

## 2020-10-28 DIAGNOSIS — E11.9 TYPE 2 DIABETES MELLITUS WITHOUT COMPLICATION, WITHOUT LONG-TERM CURRENT USE OF INSULIN (HCC): ICD-10-CM

## 2020-10-29 RX ORDER — INSULIN GLARGINE 100 [IU]/ML
INJECTION, SOLUTION SUBCUTANEOUS
Qty: 15 ML | Refills: 0 | OUTPATIENT
Start: 2020-10-29

## 2020-11-04 DIAGNOSIS — E11.9 TYPE 2 DIABETES MELLITUS WITHOUT COMPLICATION, WITHOUT LONG-TERM CURRENT USE OF INSULIN (HCC): ICD-10-CM

## 2020-11-04 RX ORDER — INSULIN GLARGINE 100 [IU]/ML
INJECTION, SOLUTION SUBCUTANEOUS
Qty: 15 ML | Refills: 0 | OUTPATIENT
Start: 2020-11-04

## 2020-11-08 DIAGNOSIS — E11.9 TYPE 2 DIABETES MELLITUS WITHOUT COMPLICATION, WITHOUT LONG-TERM CURRENT USE OF INSULIN (HCC): ICD-10-CM

## 2020-11-11 RX ORDER — INSULIN GLARGINE 100 [IU]/ML
INJECTION, SOLUTION SUBCUTANEOUS
Qty: 15 ML | Refills: 0 | OUTPATIENT
Start: 2020-11-11

## 2021-01-27 DIAGNOSIS — K86.0 ALCOHOL-INDUCED CHRONIC PANCREATITIS (HCC): ICD-10-CM

## 2021-01-27 DIAGNOSIS — E11.9 TYPE 2 DIABETES MELLITUS WITHOUT COMPLICATION, WITHOUT LONG-TERM CURRENT USE OF INSULIN (HCC): ICD-10-CM

## 2021-01-27 DIAGNOSIS — I21.4 NSTEMI (NON-ST ELEVATED MYOCARDIAL INFARCTION) (HCC): ICD-10-CM

## 2021-01-28 DIAGNOSIS — I21.4 NSTEMI (NON-ST ELEVATED MYOCARDIAL INFARCTION) (HCC): ICD-10-CM

## 2021-01-28 DIAGNOSIS — E11.9 TYPE 2 DIABETES MELLITUS WITHOUT COMPLICATION, WITHOUT LONG-TERM CURRENT USE OF INSULIN (HCC): ICD-10-CM

## 2021-01-28 RX ORDER — FENOFIBRATE 145 MG/1
TABLET, COATED ORAL
Qty: 90 TABLET | Refills: 0 | Status: SHIPPED | OUTPATIENT
Start: 2021-01-28 | End: 2021-05-04

## 2021-01-28 RX ORDER — LISINOPRIL 20 MG/1
TABLET ORAL
Qty: 90 TABLET | Refills: 0 | Status: SHIPPED | OUTPATIENT
Start: 2021-01-28 | End: 2021-05-04

## 2021-01-28 RX ORDER — EMPAGLIFLOZIN 25 MG/1
TABLET, FILM COATED ORAL
Qty: 90 TABLET | Refills: 0 | OUTPATIENT
Start: 2021-01-28

## 2021-01-28 RX ORDER — INSULIN LISPRO 200 [IU]/ML
INJECTION, SOLUTION SUBCUTANEOUS
Qty: 6 ML | Refills: 0 | Status: SHIPPED | OUTPATIENT
Start: 2021-01-28 | End: 2021-03-04

## 2021-01-28 RX ORDER — ATORVASTATIN CALCIUM 80 MG/1
TABLET, FILM COATED ORAL
Qty: 90 TABLET | Refills: 0 | Status: SHIPPED | OUTPATIENT
Start: 2021-01-28 | End: 2021-05-04

## 2021-01-28 RX ORDER — EMPAGLIFLOZIN 25 MG/1
TABLET, FILM COATED ORAL
Qty: 30 TABLET | Refills: 0 | Status: SHIPPED | OUTPATIENT
Start: 2021-01-28 | End: 2021-03-08

## 2021-01-28 RX ORDER — METOPROLOL SUCCINATE 25 MG/1
TABLET, EXTENDED RELEASE ORAL
Qty: 90 TABLET | Refills: 0 | Status: SHIPPED | OUTPATIENT
Start: 2021-01-28 | End: 2021-05-04

## 2021-01-28 RX ORDER — CLOPIDOGREL BISULFATE 75 MG/1
TABLET ORAL
Qty: 90 TABLET | Refills: 0 | Status: SHIPPED | OUTPATIENT
Start: 2021-01-28 | End: 2021-05-04

## 2021-03-03 DIAGNOSIS — E11.9 TYPE 2 DIABETES MELLITUS WITHOUT COMPLICATION, WITHOUT LONG-TERM CURRENT USE OF INSULIN (HCC): ICD-10-CM

## 2021-03-03 DIAGNOSIS — I21.4 NSTEMI (NON-ST ELEVATED MYOCARDIAL INFARCTION) (HCC): ICD-10-CM

## 2021-03-04 RX ORDER — INSULIN LISPRO 200 [IU]/ML
INJECTION, SOLUTION SUBCUTANEOUS
Qty: 6 ML | Refills: 0 | Status: SHIPPED | OUTPATIENT
Start: 2021-03-04 | End: 2021-04-01 | Stop reason: ALTCHOICE

## 2021-03-05 RX ORDER — EMPAGLIFLOZIN 25 MG/1
TABLET, FILM COATED ORAL
Qty: 30 TABLET | Refills: 0 | OUTPATIENT
Start: 2021-03-05

## 2021-03-07 DIAGNOSIS — E11.9 TYPE 2 DIABETES MELLITUS WITHOUT COMPLICATION, WITHOUT LONG-TERM CURRENT USE OF INSULIN (HCC): ICD-10-CM

## 2021-03-07 DIAGNOSIS — I21.4 NSTEMI (NON-ST ELEVATED MYOCARDIAL INFARCTION) (HCC): ICD-10-CM

## 2021-03-08 RX ORDER — EMPAGLIFLOZIN 25 MG/1
TABLET, FILM COATED ORAL
Qty: 30 TABLET | Refills: 0 | Status: SHIPPED | OUTPATIENT
Start: 2021-03-08 | End: 2021-04-01 | Stop reason: SDUPTHER

## 2021-04-01 ENCOUNTER — OFFICE VISIT (OUTPATIENT)
Dept: ENDOCRINOLOGY | Facility: CLINIC | Age: 46
End: 2021-04-01
Payer: COMMERCIAL

## 2021-04-01 VITALS
HEIGHT: 70 IN | DIASTOLIC BLOOD PRESSURE: 80 MMHG | WEIGHT: 209.6 LBS | SYSTOLIC BLOOD PRESSURE: 120 MMHG | HEART RATE: 71 BPM | BODY MASS INDEX: 30.01 KG/M2

## 2021-04-01 DIAGNOSIS — I21.4 NSTEMI (NON-ST ELEVATED MYOCARDIAL INFARCTION) (HCC): ICD-10-CM

## 2021-04-01 DIAGNOSIS — E11.9 TYPE 2 DIABETES MELLITUS WITHOUT COMPLICATION, WITHOUT LONG-TERM CURRENT USE OF INSULIN (HCC): Primary | ICD-10-CM

## 2021-04-01 DIAGNOSIS — E78.5 DYSLIPIDEMIA: ICD-10-CM

## 2021-04-01 LAB — SL AMB POCT HEMOGLOBIN AIC: 6.3 (ref ?–6.5)

## 2021-04-01 PROCEDURE — 83036 HEMOGLOBIN GLYCOSYLATED A1C: CPT | Performed by: NURSE PRACTITIONER

## 2021-04-01 PROCEDURE — 99214 OFFICE O/P EST MOD 30 MIN: CPT | Performed by: NURSE PRACTITIONER

## 2021-04-01 RX ORDER — EMPAGLIFLOZIN 25 MG/1
25 TABLET, FILM COATED ORAL EVERY MORNING
Qty: 90 TABLET | Refills: 3 | Status: SHIPPED | OUTPATIENT
Start: 2021-04-01 | End: 2021-09-09 | Stop reason: SDUPTHER

## 2021-04-01 NOTE — PROGRESS NOTES
Established Patient Progress Note      Chief Complaint   Patient presents with    Diabetes Type 2          History of Present Illness:   Varsha Curry is a 51 y o  male with a history of HLD and type 2 diabetes with long term use of insulin December 2019  Reports complications of CAD  POC A1C 6 3  He has not been seen in over a year  He has been off Lantus for awhile as he was out of refills  He has been taking his Humalog and having episodes of hypoglycemia into the 50s  He has made substantial improvement to his diet and exercise regimen  He has lost 10 lbs  BG ranging 120-140s  Denies recent illness or hospitalizations  Denies recent severe hypoglycemic or severe hyperglycemic episodes  Denies any issues with his current regimen  Home glucose monitoring: are performed regularly      Current regimen: Humalog 12-12-12 and Jardiance 25 mg   compliant all of the timedenies any side effects from current medications     Hypoglycemic episodes: Yes frequent   H/o of hypoglycemia causing hospitalization or Intervention such as glucagon injection or ambulance call No   Hypoglycemia symptoms: dizziness, headache and sweating   Treatment of hypoglycemia: orange juice     Last Eye Exam: needs to schedule   Last Foot Exam: performs self care     Has hyperlipidemia: Taking Atorvastatin and Fenofibrate     Patient Active Problem List   Diagnosis    Pancreatic pseudocyst    [facility]    Liver mass    Generalized abdominal pain    Eosinophilic esophagitis    Hypokalemia    Coronary artery disease involving native coronary artery of native heart without angina pectoris    Gastroesophageal reflux disease with esophagitis    Type 2 diabetes mellitus without complication, without long-term current use of insulin ([facility]    Portal vein thrombosis    Dyslipidemia      Past Medical History:   Diagnosis Date    Diabetes mellitus ([facility] )     pre-diabetic     Hypertension     Pancreatitis     Portal vein thrombosis       Past Surgical History:   Procedure Laterality Date    ABDOMINAL SURGERY      CHOLECYSTECTOMY      NM EDG US EXAM SURGICAL ALTER STOM DUODENUM/JEJUNUM N/A 7/24/2017    Procedure: LINEAR ENDOSCOPIC U/S WITH AXIOS STENT;  Surgeon: Morris Rowley MD;  [facility]; Service: Gastroenterology    NM EDG US EXAM SURGICAL ALTER STOM DUODENUM/JEJUNUM N/A 9/14/2017    Procedure: LINEAR ENDOSCOPIC U/S POSSIBLE AXIOS;  Surgeon: Morris Rowley MD;  [facility]; Service: Gastroenterology    WISDOM TOOTH EXTRACTION      20 years ago      History reviewed  No pertinent family history    Social History     Tobacco Use    Smoking status: Never Smoker    Smokeless tobacco: Never Used   Substance Use Topics    Alcohol use: Yes     Comment: socially -rarely     No Known Allergies      Current Outpatient Medications:     atorvastatin (LIPITOR) 80 mg tablet, TAKE 1 TABLET BY MOUTH ONCE DAILY AFTER  DINNER, Disp: 90 tablet, Rfl: 0    BD PEN NEEDLE BYRON 2ND GEN 32G X 4 MM MISC, Inject as directed 4 (four) times a day, Disp: 360 each, Rfl: 1    clopidogrel (PLAVIX) 75 mg tablet, Take 1 tablet by mouth once daily, Disp: 90 tablet, Rfl: 0    Empagliflozin (Jardiance) 25 MG TABS, Take 1 tablet (25 mg total) by mouth every morning, Disp: 90 tablet, Rfl: 3    fenofibrate (TRICOR) 145 mg tablet, Take 1 tablet by mouth once daily, Disp: 90 tablet, Rfl: 0    lisinopril (ZESTRIL) 20 mg tablet, Take 1 tablet by mouth once daily, Disp: 90 tablet, Rfl: 0    metoprolol succinate (TOPROL-XL) 25 mg 24 hr tablet, Take 1 tablet by mouth once daily, Disp: 90 tablet, Rfl: 0    Multiple Vitamins-Minerals (MULTIVITAMIN WITH MINERALS) tablet, Take 1 tablet by mouth daily, Disp: , Rfl:     Omega-3 Fatty Acids (FISH OIL) 1,000 mg, Take 1,000 mg by mouth daily, Disp: , Rfl:     aspirin 81 mg chewable tablet, Chew 1 tablet (81 mg total) daily, Disp: 90 each, Rfl: 0    nitroglycerin (NITROSTAT) 0 4 mg SL tablet, Place 1 tablet (0 4 mg total) under the tongue every 5 (five) minutes as needed for chest pain, Disp: 20 tablet, Rfl: 0    Review of Systems   Constitutional: Negative for activity change, appetite change and fatigue  HENT: Negative for sore throat, trouble swallowing and voice change  Eyes: Negative for visual disturbance  Respiratory: Negative for choking, chest tightness and shortness of breath  Cardiovascular: Negative for chest pain, palpitations and leg swelling  Gastrointestinal: Negative for abdominal pain, constipation and diarrhea  Endocrine: Negative for cold intolerance, heat intolerance, polydipsia, polyphagia and polyuria  Genitourinary: Negative for frequency  Musculoskeletal: Negative for arthralgias and myalgias  Skin: Negative for rash  Neurological: Negative for dizziness and syncope  Hematological: Negative for adenopathy  Psychiatric/Behavioral: Negative for sleep disturbance  All other systems reviewed and are negative  Physical Exam:  Body mass index is 30 07 kg/m²  /80   Pulse 71   Ht 5' 10" (1 778 m)   Wt 95 1 kg (209 lb 9 6 oz)   BMI 30 07 kg/m²    Wt Readings from Last 3 Encounters:   04/01/21 95 1 kg (209 lb 9 6 oz)   01/24/20 90 8 kg (200 lb 3 2 oz)   01/06/20 86 6 kg (191 lb)       Physical Exam  Vitals signs reviewed  Constitutional:       General: He is not in acute distress  Appearance: He is well-developed  HENT:      Head: Normocephalic and atraumatic  Eyes:      Conjunctiva/sclera: Conjunctivae normal       Pupils: Pupils are equal, round, and reactive to light  Neck:      Musculoskeletal: Normal range of motion and neck supple  Thyroid: No thyromegaly  Cardiovascular:      Rate and Rhythm: Normal rate and regular rhythm  Heart sounds: Normal heart sounds  No murmur  Pulmonary:      Effort: Pulmonary effort is normal  No respiratory distress  Breath sounds: Normal breath sounds  No wheezing or rales  Abdominal:      General: Bowel sounds are normal  There is no distension  Palpations: Abdomen is soft  Tenderness: There is no abdominal tenderness  Musculoskeletal: Normal range of motion  Lymphadenopathy:      Cervical: No cervical adenopathy  Skin:     General: Skin is warm and dry  Neurological:      Mental Status: He is alert and oriented to person, place, and time  Labs:     Lab Results   Component Value Date    HGBA1C 6 3 04/01/2021       Lab Results   Component Value Date     06/26/2015    SODIUM 138 12/21/2019    K 3 6 12/21/2019     (H) 12/21/2019    CO2 25 12/21/2019    ANIONGAP 7 06/26/2015    AGAP 4 12/21/2019    BUN 19 12/21/2019    CREATININE 0 97 12/21/2019    GLUC 203 (H) 12/21/2019    GLUF 108 (H) 05/15/2017    CALCIUM 9 5 12/21/2019    AST 27 12/18/2019    ALT 41 12/18/2019    ALKPHOS 169 (H) 12/18/2019    PROT 8 7 (H) 06/26/2015    TP 8 1 12/18/2019    BILITOT 0 26 06/26/2015    TBILI 0 43 12/18/2019    EGFR 94 12/21/2019         Lab Results   Component Value Date    CHOL 186 12/15/2014    HDL 37 (L) 12/18/2019    TRIG 841 (H) 12/18/2019         Impression & Plan:    Problem List Items Addressed This Visit        Endocrine    Type 2 diabetes mellitus without complication, without long-term current use of insulin ([facility] [address] ) - Primary     POC A1C 6 3 today  At goal  Patient has no BG log for review  Has not been seen in over a year  Is having frequent hypoglycemia  Recommend to stop Humalog and continue Jardiance  He was instructed to check BG 2x per day at alternating times of day and send in BG log in two weeks for review  Complete labs ar ordered            Relevant Medications    Empagliflozin (Jardiance) 25 MG TABS    Other Relevant Orders    POCT hemoglobin A1c (Completed)    Comprehensive metabolic panel    Lipid Panel with Direct LDL reflex    Microalbumin / creatinine urine ratio    Hemoglobin A1C    Comprehensive metabolic panel    Lipid Panel with Direct LDL reflex       Other    Dyslipidemia     Continue current regimen, check fasting lipid panel          Relevant Orders    Lipid Panel with Direct LDL reflex      Other Visit Diagnoses     NSTEMI (non-ST elevated myocardial infarction) ([facility] [address] )              Orders Placed This Encounter   Procedures    Comprehensive metabolic panel     This is a patient instruction: Patient fasting for 8 hours or longer recommended   Lipid Panel with Direct LDL reflex     This is a patient instruction: This test requires patient fasting for 10-12 hours or longer  Drinking of black coffee or black tea is acceptable   Microalbumin / creatinine urine ratio    Hemoglobin A1C     Standing Status:   Future     Standing Expiration Date:   4/1/2022    Comprehensive metabolic panel     This is a patient instruction: Patient fasting for 8 hours or longer recommended  Standing Status:   Future     Standing Expiration Date:   4/1/2022    Lipid Panel with Direct LDL reflex     This is a patient instruction: This test requires patient fasting for 10-12 hours or longer  Drinking of black coffee or black tea is acceptable  Standing Status:   Future     Standing Expiration Date:   4/1/2022    POCT hemoglobin A1c         Discussed with the patient and all questioned fully answered  He will call me if any problems arise  Follow-up appointment in 3 months       Counseled patient on diagnostic results, prognosis, risk and benefit of treatment options, instruction for management, importance of treatment compliance, Risk  factor reduction and impressions      Malorie Hidalgo [address]

## 2021-04-01 NOTE — ASSESSMENT & PLAN NOTE
POC A1C 6 3 today  At goal  Patient has no BG log for review  Has not been seen in over a year  Is having frequent hypoglycemia  Recommend to stop Humalog and continue Jardiance  He was instructed to check BG 2x per day at alternating times of day and send in BG log in two weeks for review  Complete labs ar ordered

## 2021-04-29 DIAGNOSIS — K86.0 ALCOHOL-INDUCED CHRONIC PANCREATITIS (HCC): ICD-10-CM

## 2021-04-29 DIAGNOSIS — I21.4 NSTEMI (NON-ST ELEVATED MYOCARDIAL INFARCTION) (HCC): ICD-10-CM

## 2021-04-29 RX ORDER — ATORVASTATIN CALCIUM 80 MG/1
TABLET, FILM COATED ORAL
Qty: 90 TABLET | Refills: 0 | OUTPATIENT
Start: 2021-04-29

## 2021-04-29 RX ORDER — FENOFIBRATE 145 MG/1
TABLET, COATED ORAL
Qty: 90 TABLET | Refills: 0 | OUTPATIENT
Start: 2021-04-29

## 2021-04-29 RX ORDER — LISINOPRIL 20 MG/1
TABLET ORAL
Qty: 90 TABLET | Refills: 0 | OUTPATIENT
Start: 2021-04-29

## 2021-04-29 RX ORDER — CLOPIDOGREL BISULFATE 75 MG/1
TABLET ORAL
Qty: 90 TABLET | Refills: 0 | OUTPATIENT
Start: 2021-04-29

## 2021-04-29 RX ORDER — METOPROLOL SUCCINATE 25 MG/1
TABLET, EXTENDED RELEASE ORAL
Qty: 90 TABLET | Refills: 0 | OUTPATIENT
Start: 2021-04-29

## 2021-05-04 DIAGNOSIS — K86.0 ALCOHOL-INDUCED CHRONIC PANCREATITIS (HCC): ICD-10-CM

## 2021-05-04 DIAGNOSIS — I21.4 NSTEMI (NON-ST ELEVATED MYOCARDIAL INFARCTION) (HCC): ICD-10-CM

## 2021-05-04 RX ORDER — METOPROLOL SUCCINATE 25 MG/1
TABLET, EXTENDED RELEASE ORAL
Qty: 90 TABLET | Refills: 0 | Status: SHIPPED | OUTPATIENT
Start: 2021-05-04 | End: 2021-08-05

## 2021-05-04 RX ORDER — ATORVASTATIN CALCIUM 80 MG/1
TABLET, FILM COATED ORAL
Qty: 90 TABLET | Refills: 0 | Status: SHIPPED | OUTPATIENT
Start: 2021-05-04 | End: 2021-08-05

## 2021-05-04 RX ORDER — LISINOPRIL 20 MG/1
TABLET ORAL
Qty: 90 TABLET | Refills: 0 | Status: SHIPPED | OUTPATIENT
Start: 2021-05-04 | End: 2021-08-05

## 2021-05-04 RX ORDER — FENOFIBRATE 145 MG/1
TABLET, COATED ORAL
Qty: 90 TABLET | Refills: 0 | Status: SHIPPED | OUTPATIENT
Start: 2021-05-04 | End: 2021-08-05

## 2021-05-04 RX ORDER — CLOPIDOGREL BISULFATE 75 MG/1
TABLET ORAL
Qty: 90 TABLET | Refills: 0 | Status: SHIPPED | OUTPATIENT
Start: 2021-05-04 | End: 2021-08-05

## 2021-08-05 DIAGNOSIS — I21.4 NSTEMI (NON-ST ELEVATED MYOCARDIAL INFARCTION) (HCC): ICD-10-CM

## 2021-08-05 DIAGNOSIS — K86.0 ALCOHOL-INDUCED CHRONIC PANCREATITIS (HCC): ICD-10-CM

## 2021-08-05 RX ORDER — FENOFIBRATE 145 MG/1
TABLET, COATED ORAL
Qty: 90 TABLET | Refills: 0 | Status: SHIPPED | OUTPATIENT
Start: 2021-08-05 | End: 2021-08-09

## 2021-08-05 RX ORDER — ATORVASTATIN CALCIUM 80 MG/1
TABLET, FILM COATED ORAL
Qty: 90 TABLET | Refills: 0 | Status: SHIPPED | OUTPATIENT
Start: 2021-08-05 | End: 2021-08-09

## 2021-08-05 RX ORDER — METOPROLOL SUCCINATE 25 MG/1
TABLET, EXTENDED RELEASE ORAL
Qty: 90 TABLET | Refills: 0 | Status: SHIPPED | OUTPATIENT
Start: 2021-08-05 | End: 2021-08-09

## 2021-08-05 RX ORDER — LISINOPRIL 20 MG/1
TABLET ORAL
Qty: 90 TABLET | Refills: 0 | Status: SHIPPED | OUTPATIENT
Start: 2021-08-05 | End: 2021-08-09

## 2021-08-05 RX ORDER — CLOPIDOGREL BISULFATE 75 MG/1
TABLET ORAL
Qty: 90 TABLET | Refills: 0 | Status: SHIPPED | OUTPATIENT
Start: 2021-08-05 | End: 2021-08-09

## 2021-08-06 DIAGNOSIS — K86.0 ALCOHOL-INDUCED CHRONIC PANCREATITIS (HCC): ICD-10-CM

## 2021-08-06 DIAGNOSIS — I21.4 NSTEMI (NON-ST ELEVATED MYOCARDIAL INFARCTION) (HCC): ICD-10-CM

## 2021-08-09 RX ORDER — ATORVASTATIN CALCIUM 80 MG/1
TABLET, FILM COATED ORAL
Qty: 90 TABLET | Refills: 0 | Status: SHIPPED | OUTPATIENT
Start: 2021-08-09 | End: 2021-12-08

## 2021-08-09 RX ORDER — FENOFIBRATE 145 MG/1
TABLET, COATED ORAL
Qty: 90 TABLET | Refills: 0 | Status: SHIPPED | OUTPATIENT
Start: 2021-08-09 | End: 2021-12-08

## 2021-08-09 RX ORDER — LISINOPRIL 20 MG/1
TABLET ORAL
Qty: 90 TABLET | Refills: 0 | Status: SHIPPED | OUTPATIENT
Start: 2021-08-09 | End: 2021-12-08

## 2021-08-09 RX ORDER — METOPROLOL SUCCINATE 25 MG/1
TABLET, EXTENDED RELEASE ORAL
Qty: 90 TABLET | Refills: 0 | Status: SHIPPED | OUTPATIENT
Start: 2021-08-09

## 2021-08-09 RX ORDER — CLOPIDOGREL BISULFATE 75 MG/1
TABLET ORAL
Qty: 90 TABLET | Refills: 0 | Status: SHIPPED | OUTPATIENT
Start: 2021-08-09 | End: 2021-09-21

## 2021-09-09 ENCOUNTER — OFFICE VISIT (OUTPATIENT)
Dept: ENDOCRINOLOGY | Facility: CLINIC | Age: 46
End: 2021-09-09
Payer: COMMERCIAL

## 2021-09-09 VITALS
HEART RATE: 74 BPM | WEIGHT: 200.6 LBS | SYSTOLIC BLOOD PRESSURE: 130 MMHG | HEIGHT: 70 IN | BODY MASS INDEX: 28.72 KG/M2 | DIASTOLIC BLOOD PRESSURE: 78 MMHG

## 2021-09-09 DIAGNOSIS — E11.9 TYPE 2 DIABETES MELLITUS WITHOUT COMPLICATION, WITHOUT LONG-TERM CURRENT USE OF INSULIN (HCC): Primary | ICD-10-CM

## 2021-09-09 DIAGNOSIS — E78.5 DYSLIPIDEMIA: ICD-10-CM

## 2021-09-09 LAB — SL AMB POCT HEMOGLOBIN AIC: 8 (ref ?–6.5)

## 2021-09-09 PROCEDURE — 83036 HEMOGLOBIN GLYCOSYLATED A1C: CPT | Performed by: NURSE PRACTITIONER

## 2021-09-09 PROCEDURE — 99214 OFFICE O/P EST MOD 30 MIN: CPT | Performed by: NURSE PRACTITIONER

## 2021-09-09 RX ORDER — EMPAGLIFLOZIN 25 MG/1
25 TABLET, FILM COATED ORAL EVERY MORNING
Qty: 90 TABLET | Refills: 3 | Status: SHIPPED | OUTPATIENT
Start: 2021-09-09

## 2021-09-09 NOTE — ASSESSMENT & PLAN NOTE
Above goal due to dietary excursions and decreased activity  Was on Metformin in the past, only stopped due to being out of refills  Resume Metformin 500 mg BID  Advised to check BG at least 1-2x per day at alternating times of day and send in BG log in two weeks for review  Referral given for MNT  Referral given for diabetic eye exam  He lives closer to [facility] and is requesting to have f/u there

## 2021-09-09 NOTE — PROGRESS NOTES
Established Patient Progress Note      Chief Complaint   Patient presents with    Diabetes Type 2          History of Present Illness:   Max Obrien is a 51 y o  male with a history of HLD and type 2 diabetes with long term use of insulin December 9959  BNWOLYS complications of CAD  POC A1C 8 0  He was on Metformin in the past but stopped as he was out of refills  BG running 130-190s  Denies recent illness or hospitalizations  Denies recent severe hypoglycemic or severe hyperglycemic episodes  Denies any issues with his current regimen  Home glucose monitoring: are performed regularly      Current regimen: Jardiance 25 mg   compliant all of the timedenies any side effects from current medications  Hypoglycemic episodes: No never   H/o of hypoglycemia causing hospitalization or Intervention such as glucagon injection or ambulance call No     Last Eye Exam: needs to schedule   Last Foot Exam: performed today     Has hyperlipidemia: Taking Atorvastatin and Fenofibrate     Patient Active Problem List   Diagnosis    Pancreatic pseudocyst    Alcohol-induced chronic pancreatitis ([facility] [address] )    Liver mass    Generalized abdominal pain    Eosinophilic esophagitis    Hypokalemia    Coronary artery disease involving native coronary artery of native heart without angina pectoris    Gastroesophageal reflux disease with esophagitis    Type 2 diabetes mellitus without complication, without long-term current use of insulin (HCC)    Portal vein thrombosis    Dyslipidemia      Past Medical History:   Diagnosis Date    Diabetes mellitus ([address] )     pre-diabetic     Hypertension     Pancreatitis     Portal vein thrombosis       Past Surgical History:   Procedure Laterality Date    ABDOMINAL SURGERY      CHOLECYSTECTOMY      SD EDG US EXAM SURGICAL ALTER STOM DUODENUM/JEJUNUM N/A 7/24/2017    Procedure: LINEAR ENDOSCOPIC U/S WITH AXIOS STENT;  Surgeon: Marianna Langley MD;  Location: [facility];   Service: Gastroenterology   Walt Barker ND EDG US EXAM SURGICAL ALTER STOM DUODENUM/JEJUNUM N/A 9/14/2017    Procedure: LINEAR ENDOSCOPIC U/S POSSIBLE AXIOS;  Surgeon: Temi Reilly MD;  Location: [facility]; Service: Gastroenterology    WISDOM TOOTH EXTRACTION      20 years ago      History reviewed  No pertinent family history    Social History     Tobacco Use    Smoking status: Never Smoker    Smokeless tobacco: Never Used   Substance Use Topics    Alcohol use: Yes     Comment: socially -rarely     No Known Allergies      Current Outpatient Medications:     aspirin 81 mg chewable tablet, Chew 1 tablet (81 mg total) daily, Disp: 90 each, Rfl: 0    atorvastatin (LIPITOR) 80 mg tablet, TAKE 1 TABLET BY MOUTH ONCE DAILY AFTER SUPPER, Disp: 90 tablet, Rfl: 0    BD PEN NEEDLE BYRON 2ND GEN 32G X 4 MM MISC, Inject as directed 4 (four) times a day, Disp: 360 each, Rfl: 1    clopidogrel (PLAVIX) 75 mg tablet, Take 1 tablet by mouth once daily, Disp: 90 tablet, Rfl: 0    Empagliflozin (Jardiance) 25 MG TABS, Take 1 tablet (25 mg total) by mouth every morning, Disp: 90 tablet, Rfl: 3    fenofibrate (TRICOR) 145 mg tablet, Take 1 tablet by mouth once daily, Disp: 90 tablet, Rfl: 0    lisinopril (ZESTRIL) 20 mg tablet, Take 1 tablet by mouth once daily, Disp: 90 tablet, Rfl: 0    metoprolol succinate (TOPROL-XL) 25 mg 24 hr tablet, Take 1 tablet by mouth once daily, Disp: 90 tablet, Rfl: 0    Multiple Vitamins-Minerals (MULTIVITAMIN WITH MINERALS) tablet, Take 1 tablet by mouth daily, Disp: , Rfl:     Omega-3 Fatty Acids (FISH OIL) 1,000 mg, Take 1,000 mg by mouth daily, Disp: , Rfl:     metFORMIN (GLUCOPHAGE) 500 mg tablet, Take 1 tablet (500 mg total) by mouth 2 (two) times a day with meals, Disp: 180 tablet, Rfl: 3    nitroglycerin (NITROSTAT) 0 4 mg SL tablet, Place 1 tablet (0 4 mg total) under the tongue every 5 (five) minutes as needed for chest pain (Patient not taking: Reported on 9/9/2021), Disp: 20 tablet, Rfl: 0    Review of Systems Constitutional: Negative for activity change, appetite change and fatigue  HENT: Negative for sore throat, trouble swallowing and voice change  Eyes: Negative for visual disturbance  Respiratory: Negative for choking, chest tightness and shortness of breath  Cardiovascular: Negative for chest pain, palpitations and leg swelling  Gastrointestinal: Negative for abdominal pain, constipation and diarrhea  Endocrine: Negative for cold intolerance, heat intolerance, polydipsia, polyphagia and polyuria  Genitourinary: Negative for frequency  Musculoskeletal: Negative for arthralgias and myalgias  Skin: Negative for rash  Neurological: Negative for dizziness and syncope  Hematological: Negative for adenopathy  Psychiatric/Behavioral: Negative for sleep disturbance  All other systems reviewed and are negative  Physical Exam:  Body mass index is 28 78 kg/m²  /78   Pulse 74   Ht 5' 10" (1 778 m)   Wt 91 kg (200 lb 9 6 oz)   BMI 28 78 kg/m²    Wt Readings from Last 3 Encounters:   09/09/21 91 kg (200 lb 9 6 oz)   04/01/21 95 1 kg (209 lb 9 6 oz)   01/24/20 90 8 kg (200 lb 3 2 oz)       Physical Exam  Vitals reviewed  Constitutional:       General: He is not in acute distress  Appearance: He is well-developed  HENT:      Head: Normocephalic and atraumatic  Eyes:      Conjunctiva/sclera: Conjunctivae normal       Pupils: Pupils are equal, round, and reactive to light  Neck:      Thyroid: No thyromegaly  Cardiovascular:      Rate and Rhythm: Normal rate and regular rhythm  Pulses: no weak pulses          Dorsalis pedis pulses are 2+ on the right side and 2+ on the left side  Posterior tibial pulses are 2+ on the right side and 2+ on the left side  Heart sounds: Normal heart sounds  No murmur heard  Pulmonary:      Effort: Pulmonary effort is normal  No respiratory distress  Breath sounds: Normal breath sounds  No wheezing or rales     Abdominal: General: Bowel sounds are normal  There is no distension  Palpations: Abdomen is soft  Tenderness: There is no abdominal tenderness  Musculoskeletal:         General: Normal range of motion  Cervical back: Normal range of motion and neck supple  Feet:      Right foot:      Skin integrity: No ulcer, skin breakdown, erythema, warmth, callus or dry skin  Left foot:      Skin integrity: No ulcer, skin breakdown, erythema, warmth, callus or dry skin  Lymphadenopathy:      Cervical: No cervical adenopathy  Skin:     General: Skin is warm and dry  Neurological:      Mental Status: He is alert and oriented to person, place, and time  Patient's shoes and socks removed  Right Foot/Ankle   Right Foot Inspection  Skin Exam: skin normal skin not intact, no dry skin, no warmth, no callus, no erythema, no maceration, no abnormal color, no pre-ulcer, no ulcer and no callus                            Sensory   Vibration: intact    Monofilament testing: intact  Vascular  Capillary refills: < 3 seconds  The right DP pulse is 2+  The right PT pulse is 2+  Left Foot/Ankle  Left Foot Inspection  Skin Exam: skin normalskin not intact, no dry skin, no warmth, no erythema, no maceration, normal color, no pre-ulcer, no ulcer and no callus                                         Sensory   Vibration: intact    Monofilament: intact  Vascular  Capillary refills: < 3 seconds  The left DP pulse is 2+  The left PT pulse is 2+  Assign Risk Category:  No deformity present; No loss of protective sensation;  No weak pulses       Risk: 0      Labs:     Lab Results   Component Value Date    HGBA1C 8 0 (A) 09/09/2021       Lab Results   Component Value Date     06/26/2015    SODIUM 138 12/21/2019    K 3 6 12/21/2019     (H) 12/21/2019    CO2 25 12/21/2019    ANIONGAP 7 06/26/2015    AGAP 4 12/21/2019    BUN 19 12/21/2019    CREATININE 0 97 12/21/2019    GLUC 203 (H) 12/21/2019    GLUF 108 (H) 05/15/2017    CALCIUM 9 5 12/21/2019    AST 27 12/18/2019    ALT 41 12/18/2019    ALKPHOS 169 (H) 12/18/2019    PROT 8 7 (H) 06/26/2015    TP 8 1 12/18/2019    BILITOT 0 26 06/26/2015    TBILI 0 43 12/18/2019    EGFR 94 12/21/2019         Lab Results   Component Value Date    CHOL 186 12/15/2014    HDL 37 (L) 12/18/2019    TRIG 841 (H) 12/18/2019         Impression & Plan:    Problem List Items Addressed This Visit        Endocrine    Type 2 diabetes mellitus without complication, without long-term current use of insulin ([address] ) - Primary     Above goal due to dietary excursions and decreased activity  Was on Metformin in the past, only stopped due to being out of refills  Resume Metformin 500 mg BID  Advised to check BG at least 1-2x per day at alternating times of day and send in BG log in two weeks for review  Referral given for MNT  Referral given for diabetic eye exam  [place] [facility] [place] have f/u there  Relevant Medications    metFORMIN (GLUCOPHAGE) 500 mg tablet    Empagliflozin (Jardiance) 25 MG TABS    Other Relevant Orders    POCT hemoglobin A1c (Completed)    Ambulatory referral to Ophthalmology    Ambulatory referral to Diabetic Education    Hemoglobin A1C    Comprehensive metabolic panel    Lipid Panel with Direct LDL reflex    Microalbumin / creatinine urine ratio       Other    Dyslipidemia     Continue current regimen, stressed importance of completing fasting lipid panel          Relevant Orders    Lipid Panel with Direct LDL reflex          Orders Placed This Encounter   Procedures    Hemoglobin A1C    Comprehensive metabolic panel     This is a patient instruction: Patient fasting for 8 hours or longer recommended   Lipid Panel with Direct LDL reflex     This is a patient instruction: This test requires patient fasting for 10-12 hours or longer  Drinking of black coffee or black tea is acceptable      Microalbumin / creatinine urine ratio    Ambulatory referral to Ophthalmology     Standing Status:   Future     Standing Expiration Date:   9/9/2022     Referral Priority:   Routine     Referral Type:   Consult - AMB     Referral Reason:   Specialty Services Required     Referred to Provider:   Magdalene Landry DO     Requested Specialty:   Ophthalmology     Number of Visits Requested:   1     Expiration Date:   9/9/2022    Ambulatory referral to Diabetic Education     Standing Status:   Future     Standing Expiration Date:   9/9/2022     Referral Priority:   Routine     Referral Type:   Consult - AMB     Referral Reason:   Specialty Services Required     Requested Specialty:   Diabetes Services     Number of Visits Requested:   1     Expiration Date:   9/9/2022    POCT hemoglobin A1c         Discussed with the patient and all questioned fully answered  He will call me if any problems arise  Follow-up appointment in 3 months       Counseled patient on diagnostic results, prognosis, risk and benefit of treatment options, instruction for management, importance of treatment compliance, Risk  factor reduction and impressions      Malorie Hidalgo [address]

## 2021-09-21 ENCOUNTER — OFFICE VISIT (OUTPATIENT)
Dept: CARDIOLOGY CLINIC | Facility: CLINIC | Age: 46
End: 2021-09-21
Payer: COMMERCIAL

## 2021-09-21 VITALS
DIASTOLIC BLOOD PRESSURE: 74 MMHG | OXYGEN SATURATION: 97 % | WEIGHT: 203.4 LBS | HEIGHT: 70 IN | BODY MASS INDEX: 29.12 KG/M2 | SYSTOLIC BLOOD PRESSURE: 100 MMHG | HEART RATE: 60 BPM

## 2021-09-21 DIAGNOSIS — I25.10 CORONARY ARTERY DISEASE INVOLVING NATIVE CORONARY ARTERY OF NATIVE HEART WITHOUT ANGINA PECTORIS: Primary | ICD-10-CM

## 2021-09-21 DIAGNOSIS — E78.5 DYSLIPIDEMIA: ICD-10-CM

## 2021-09-21 PROCEDURE — 99214 OFFICE O/P EST MOD 30 MIN: CPT | Performed by: INTERNAL MEDICINE

## 2021-09-21 PROCEDURE — 93000 ELECTROCARDIOGRAM COMPLETE: CPT | Performed by: INTERNAL MEDICINE

## 2021-09-21 NOTE — PROGRESS NOTES
Cardiology Follow Up    Keara Tapia  1975  [id number]  [facility] [address] [place] [id number] [phone]    1  Coronary artery disease involving native coronary artery of native heart without angina pectoris  POCT ECG    LDL cholesterol, direct    Echo complete with contrast if indicated   2  Dyslipidemia  LDL cholesterol, direct       Discussion/Summary:      67-year-old man with a history of a non ST-elevation MI involving the LAD in December of 2019  Ejection fraction 45%  Has hypertension, diabetes, dyslipidemia  He is a few years out from his MI, he can stop Plavix, but continue aspirin  Previously, at the time of his MI, he was started on fenofibrate in addition to atorvastatin because of markedly elevated triglycerides  At that time, diabetes was not well controlled, either  He has blood work to be done from his endocrinologist   I will add a direct LDL to see if we can taper down on the fibrate and continue the atorvastatin alone other sugar is a little better  Diabetes management per endocrinology  Blood pressure little on the lower side, but would continue lisinopril and metoprolol for now  Can make adjustments if needed  Update echocardiogram to re-evaluate ejection fraction few years post revascularization  History of Present Illness:     67-year-old man  Overdue for follow-up  History of coronary disease with a non ST-elevation MI in December of 2019  Stent to the LAD  Otherwise, 50% disease in an OM 1  Ejection fraction 45% on echocardiogram       Has hypertension, diabetes  Triglycerides were significantly elevated, therefore in addition to high-intensity atorvastatin, he has been on fenofibrate  History of alcoholic pancreatitis  Reports seeing family physician in [place]  Seeing Endocrinology as well      Blood work done point of care hemoglobin A1c showed increased to 8%  He was put back on metformin  He returns overdue for follow-up  Cardiac standpoint, he is feeling very well  Denies any chest pain, shortness of breath  I suspect there have been some lapses in his medications, but he tells me he has been compliant overall  Has not had any cardiac testing  No ER visits or hospitalizations  Was given blood work to be done by his endocrinologist when he saw them the other day  Medical Problems     Problem List     Pancreatic pseudocyst    Alcohol-induced chronic pancreatitis (HCC)    Liver mass    Generalized abdominal pain    Overview Signed 12/18/2019  4:57 AM by Sarbjit Garcia DO     Procedure/Onset: 10/16/2009         Eosinophilic esophagitis    Hypokalemia    Gastroesophageal reflux disease with esophagitis    Type 2 diabetes mellitus without complication, without long-term current use of insulin (HCC)      Lab Results   Component Value Date    HGBA1C 8 0 (A) 09/09/2021         Portal vein thrombosis    Dyslipidemia    Coronary artery disease involving native coronary artery of native heart without angina pectoris              Past Medical History:   Diagnosis Date    Diabetes mellitus ([address] )     pre-diabetic     Hypertension     Pancreatitis     Portal vein thrombosis      Social History     Tobacco Use    Smoking status: Never Smoker    Smokeless tobacco: Never Used   Vaping Use    Vaping Use: Never used   Substance Use Topics    Alcohol use: Yes     Comment: socially -rarely    Drug use: No      No family history on file  Past Surgical History:   Procedure Laterality Date    ABDOMINAL SURGERY      CHOLECYSTECTOMY      MN EDG US EXAM SURGICAL ALTER STOM DUODENUM/JEJUNUM N/A 7/24/2017    Procedure: LINEAR ENDOSCOPIC U/S WITH AXIOS STENT;  Surgeon: Rome Stein MD;  [facility];   Service: Gastroenterology    MN EDG US EXAM SURGICAL ALTER STOM DUODENUM/JEJUNUM N/A 9/14/2017    Procedure: Marlene Rueda ENDOSCOPIC U/S POSSIBLE AXIOS;  Surgeon: Jake Howell MD;  Location: [facility]; Service: Gastroenterology    WISDOM TOOTH EXTRACTION      20 years ago       Current Outpatient Medications:     aspirin 81 mg chewable tablet, Chew 1 tablet (81 mg total) daily, Disp: 90 each, Rfl: 0    atorvastatin (LIPITOR) 80 mg tablet, TAKE 1 TABLET BY MOUTH ONCE DAILY AFTER SUPPER, Disp: 90 tablet, Rfl: 0    BD PEN NEEDLE BYRON 2ND GEN 32G X 4 MM MISC, Inject as directed 4 (four) times a day, Disp: 360 each, Rfl: 1    Empagliflozin (Jardiance) 25 MG TABS, Take 1 tablet (25 mg total) by mouth every morning, Disp: 90 tablet, Rfl: 3    fenofibrate (TRICOR) 145 mg tablet, Take 1 tablet by mouth once daily, Disp: 90 tablet, Rfl: 0    lisinopril (ZESTRIL) 20 mg tablet, Take 1 tablet by mouth once daily, Disp: 90 tablet, Rfl: 0    metFORMIN (GLUCOPHAGE) 500 mg tablet, Take 1 tablet (500 mg total) by mouth 2 (two) times a day with meals, Disp: 180 tablet, Rfl: 3    metoprolol succinate (TOPROL-XL) 25 mg 24 hr tablet, Take 1 tablet by mouth once daily, Disp: 90 tablet, Rfl: 0    Multiple Vitamins-Minerals (MULTIVITAMIN WITH MINERALS) tablet, Take 1 tablet by mouth daily, Disp: , Rfl:     Omega-3 Fatty Acids (FISH OIL) 1,000 mg, Take 1,000 mg by mouth daily, Disp: , Rfl:     nitroglycerin (NITROSTAT) 0 4 mg SL tablet, Place 1 tablet (0 4 mg total) under the tongue every 5 (five) minutes as needed for chest pain (Patient not taking: Reported on 9/9/2021), Disp: 20 tablet, Rfl: 0  No Known Allergies    Vitals:    09/21/21 1054   BP: 100/74   Pulse: 60   SpO2: 97%   Weight: 92 3 kg (203 lb 6 4 oz)   Height: 5' 10" (1 778 m)     Vitals:    09/21/21 1054   Weight: 92 3 kg (203 lb 6 4 oz)      Height: 5' 10" (177 8 cm)   Body mass index is 29 18 kg/m²      Physical Exam:  GENERAL: Alert, well appearing, and in no distress  HEENT:  PERRL, EOMI, no scleral icterus, no conjunctival pallor  NECK:  Supple, No elevated JVP, no thyromegaly, no carotid bruits  HEART:  Regular rate and rhythm, normal S1/S2, no S3/S4, no murmur or rub  LUNGS:  Clear to auscultation bilaterally  ABDOMEN:  Soft, non-tender, positive bowel sounds, no rebound or guarding  EXTREMITIES:  No edema  VASCULAR:  Normal pedal pulses   NEURO: No focal deficits,  SKIN: Normal without suspicious lesions on exposed skin      ROS:  Except as noted in HPI, is otherwise reviewed in detail and a 12 point review of systems is negative  Labs:  Lab Results   Component Value Date    SODIUM 138 12/21/2019    K 3 6 12/21/2019     (H) 12/21/2019    CREATININE 0 97 12/21/2019    BUN 19 12/21/2019    CO2 25 12/21/2019    ALT 41 12/18/2019    AST 27 12/18/2019    INR 0 96 12/18/2019    GLUF 108 (H) 05/15/2017    HGBA1C 8 0 (A) 09/09/2021    WBC 6 02 12/19/2019    HGB 13 7 12/19/2019    HCT 41 7 12/19/2019     12/19/2019       Lab Results   Component Value Date    CHOL 186 12/15/2014     Lab Results   Component Value Date    LDLCALC  12/18/2019      Comment:      Calculated LDL invalid, triglycerides >400 mg/dl    LDLCALC 114 (H) 12/15/2014     Lab Results   Component Value Date    HDL 37 (L) 12/18/2019    HDL 46 12/15/2014     Lab Results   Component Value Date    TRIG 841 (H) 12/18/2019    TRIG 129 12/15/2014       Testing:  Echo 12/18/19:  LEFT VENTRICLE:  Systolic function was mildly reduced  Ejection fraction was estimated to be 45 %  There was dyskinesis of the mid-distal anteroseptal wall(s)  There was hypokinesis of the mid anteroseptal, mid inferoseptal, apical inferior, apical septal, and apical wall(s)  Doppler parameters were consistent with abnormal left ventricular relaxation (grade 1 diastolic dysfunction)    There was a false tendon within the ventricle, towards the apex      RIGHT VENTRICLE:  The size was normal   Systolic function was normal      MITRAL VALVE:  There was trace regurgitation      PULMONIC VALVE:  There was trace regurgitation      IVC, HEPATIC VEINS:  Respirophasic changes were blunted (less than 50% variation)  Cardiac Cath 12/18/19:  CORONARY CIRCULATION:  Mid LAD: There was a discrete 99 % stenosis  There was GM grade 2 flow through the vessel (partial perfusion)  This lesion is a likely culprit for the patient's recent myocardial infarction  An intervention was performed  1st obtuse marginal: There was a discrete 50 % stenosis  It appears amenable to percutaneous intervention      1ST LESION INTERVENTIONS:  A successful balloon angioplasty with stent procedure was performed on the 99 % lesion in the mid LAD  Following intervention there was an excellent angiographic appearance with a 0 % residual stenosis  A Orsiro Rx 4 0 x 18mm drug-eluting stent was placed across the lesion and deployed at a maximum inflation pressure of 9 aris  EKG:    Sinus rhythm  60 beats per minute  Normal EKG

## 2021-11-23 ENCOUNTER — HOSPITAL ENCOUNTER (OUTPATIENT)
Dept: NON INVASIVE DIAGNOSTICS | Facility: CLINIC | Age: 46
Discharge: HOME | End: 2021-11-23
Payer: COMMERCIAL

## 2021-11-23 VITALS
WEIGHT: 203 LBS | DIASTOLIC BLOOD PRESSURE: 74 MMHG | BODY MASS INDEX: 29.06 KG/M2 | HEART RATE: 78 BPM | HEIGHT: 70 IN | SYSTOLIC BLOOD PRESSURE: 100 MMHG

## 2021-11-23 DIAGNOSIS — I25.10 CORONARY ARTERY DISEASE INVOLVING NATIVE CORONARY ARTERY OF NATIVE HEART WITHOUT ANGINA PECTORIS: ICD-10-CM

## 2021-11-23 LAB
AORTIC ROOT: 2.9 CM
AORTIC VALVE MEAN VELOCITY: 6.9 M/S
AV LVOT MEAN GRADIENT: 1 MMHG
AV LVOT PEAK GRADIENT: 2 MMHG
AV MEAN GRADIENT: 2 MMHG
AV PEAK GRADIENT: 4 MMHG
DOP CALC AO VTI: 20.44 CM
DOP CALC LVOT PEAK VEL VTI: 13.34 CM
DOP CALC LVOT PEAK VEL: 0.7 M/S
E WAVE DECELERATION TIME: 237 MS
FRACTIONAL SHORTENING: 42 % (ref 28–44)
GLOBAL LONGITUIDAL STRAIN: -17 %
INTERVENTRICULAR SEPTUM IN DIASTOLE (PARASTERNAL SHORT AXIS VIEW): 1.1 CM
LAAS-AP2: 18.3 CM2
LAAS-AP4: 16.9 CM2
LEFT INTERNAL DIMENSION IN SYSTOLE: 2.6 CM (ref 2.1–4)
LEFT VENTRICULAR INTERNAL DIMENSION IN DIASTOLE: 4.5 CM (ref 5.72–8.52)
LEFT VENTRICULAR POSTERIOR WALL IN END DIASTOLE: 1.1 CM
LEFT VENTRICULAR STROKE VOLUME: 66 ML
MV E'TISSUE VEL-LAT: 13 CM/S
MV E'TISSUE VEL-SEP: 9 CM/S
MV PEAK A VEL: 0.42 M/S
MV PEAK E VEL: 53 CM/S
MV STENOSIS PRESSURE HALF TIME: 0 MS
RIGHT ATRIAL 2D VOLUME: 31 ML
RIGHT ATRIUM AREA SYSTOLE A4C: 13.1 CM2
RIGHT VENTRICLE ID DIMENSION: 3.5 CM
SL CV LV EF: 60
SL CV PED ECHO LEFT VENTRICLE DIASTOLIC VOLUME (MOD BIPLANE) 2D: 91 ML
SL CV PED ECHO LEFT VENTRICLE SYSTOLIC VOLUME (MOD BIPLANE) 2D: 24 ML
TRICUSPID VALVE S': 51 CM/S
Z-SCORE OF LEFT VENTRICULAR DIMENSION IN END SYSTOLE: -4.39

## 2021-11-23 PROCEDURE — 93306 TTE W/DOPPLER COMPLETE: CPT

## 2021-11-23 PROCEDURE — 93356 MYOCRD STRAIN IMG SPCKL TRCK: CPT | Performed by: INTERNAL MEDICINE

## 2021-11-23 PROCEDURE — 93306 TTE W/DOPPLER COMPLETE: CPT | Performed by: INTERNAL MEDICINE

## 2021-11-23 PROCEDURE — 93356 MYOCRD STRAIN IMG SPCKL TRCK: CPT

## 2021-11-24 ENCOUNTER — TELEPHONE (OUTPATIENT)
Dept: CARDIOLOGY CLINIC | Facility: CLINIC | Age: 46
End: 2021-11-24

## 2022-01-19 ENCOUNTER — TELEPHONE (OUTPATIENT)
Dept: ENDOCRINOLOGY | Facility: CLINIC | Age: 47
End: 2022-01-19

## 2022-01-24 NOTE — TELEPHONE ENCOUNTER
Left message for patient to send blood sugar readings for Dr Valentin Don to review in order to fill out form for surgical clearnace

## 2022-07-25 ENCOUNTER — TELEPHONE (OUTPATIENT)
Dept: CARDIOLOGY CLINIC | Facility: CLINIC | Age: 47
End: 2022-07-25

## 2022-08-10 ENCOUNTER — HOSPITAL ENCOUNTER (EMERGENCY)
Facility: HOSPITAL | Age: 47
Discharge: HOME | End: 2022-08-10
Attending: EMERGENCY MEDICINE
Payer: COMMERCIAL

## 2022-08-10 VITALS
DIASTOLIC BLOOD PRESSURE: 72 MMHG | HEART RATE: 67 BPM | OXYGEN SATURATION: 97 % | TEMPERATURE: 97.5 F | RESPIRATION RATE: 15 BRPM | SYSTOLIC BLOOD PRESSURE: 120 MMHG

## 2022-08-10 DIAGNOSIS — R10.13 EPIGASTRIC PAIN: Primary | ICD-10-CM

## 2022-08-10 LAB
ALBUMIN SERPL BCP-MCNC: 4 G/DL (ref 3.5–5)
ALP SERPL-CCNC: 65 U/L (ref 46–116)
ALT SERPL W P-5'-P-CCNC: 64 U/L (ref 12–78)
ANION GAP SERPL CALCULATED.3IONS-SCNC: 5 MMOL/L (ref 4–13)
AST SERPL W P-5'-P-CCNC: 30 U/L (ref 5–45)
ATRIAL RATE: 65 BPM
BASOPHILS # BLD AUTO: 0.03 THOUSANDS/ΜL (ref 0–0.1)
BASOPHILS NFR BLD AUTO: 1 % (ref 0–1)
BILIRUB SERPL-MCNC: 0.42 MG/DL (ref 0.2–1)
BUN SERPL-MCNC: 22 MG/DL (ref 5–25)
CALCIUM SERPL-MCNC: 8.8 MG/DL (ref 8.3–10.1)
CARDIAC TROPONIN I PNL SERPL HS: 2 NG/L
CHLORIDE SERPL-SCNC: 111 MMOL/L (ref 96–108)
CO2 SERPL-SCNC: 23 MMOL/L (ref 21–32)
CREAT SERPL-MCNC: 1.11 MG/DL (ref 0.6–1.3)
EOSINOPHIL # BLD AUTO: 0.55 THOUSAND/ΜL (ref 0–0.61)
EOSINOPHIL NFR BLD AUTO: 10 % (ref 0–6)
ERYTHROCYTE [DISTWIDTH] IN BLOOD BY AUTOMATED COUNT: 13.2 % (ref 11.6–15.1)
GFR SERPL CREATININE-BSD FRML MDRD: 78 ML/MIN/1.73SQ M
GLUCOSE SERPL-MCNC: 176 MG/DL (ref 65–140)
HCT VFR BLD AUTO: 47.7 % (ref 36.5–49.3)
HGB BLD-MCNC: 15.5 G/DL (ref 12–17)
IMM GRANULOCYTES # BLD AUTO: 0.03 THOUSAND/UL (ref 0–0.2)
IMM GRANULOCYTES NFR BLD AUTO: 1 % (ref 0–2)
LIPASE SERPL-CCNC: 89 U/L (ref 73–393)
LYMPHOCYTES # BLD AUTO: 0.92 THOUSANDS/ΜL (ref 0.6–4.47)
LYMPHOCYTES NFR BLD AUTO: 17 % (ref 14–44)
MCH RBC QN AUTO: 26.6 PG (ref 26.8–34.3)
MCHC RBC AUTO-ENTMCNC: 32.5 G/DL (ref 31.4–37.4)
MCV RBC AUTO: 82 FL (ref 82–98)
MONOCYTES # BLD AUTO: 0.43 THOUSAND/ΜL (ref 0.17–1.22)
MONOCYTES NFR BLD AUTO: 8 % (ref 4–12)
NEUTROPHILS # BLD AUTO: 3.59 THOUSANDS/ΜL (ref 1.85–7.62)
NEUTS SEG NFR BLD AUTO: 63 % (ref 43–75)
NRBC BLD AUTO-RTO: 0 /100 WBCS
P AXIS: 29 DEGREES
PLATELET # BLD AUTO: 183 THOUSANDS/UL (ref 149–390)
PMV BLD AUTO: 11.3 FL (ref 8.9–12.7)
POTASSIUM SERPL-SCNC: 4.1 MMOL/L (ref 3.5–5.3)
PR INTERVAL: 200 MS
PROT SERPL-MCNC: 7.6 G/DL (ref 6.4–8.4)
QRS AXIS: 38 DEGREES
QRSD INTERVAL: 82 MS
QT INTERVAL: 384 MS
QTC INTERVAL: 399 MS
RBC # BLD AUTO: 5.83 MILLION/UL (ref 3.88–5.62)
SODIUM SERPL-SCNC: 139 MMOL/L (ref 135–147)
T WAVE AXIS: 46 DEGREES
VENTRICULAR RATE: 65 BPM
WBC # BLD AUTO: 5.55 THOUSAND/UL (ref 4.31–10.16)

## 2022-08-10 PROCEDURE — 96372 THER/PROPH/DIAG INJ SC/IM: CPT

## 2022-08-10 PROCEDURE — 36415 COLL VENOUS BLD VENIPUNCTURE: CPT

## 2022-08-10 PROCEDURE — 85025 COMPLETE CBC W/AUTO DIFF WBC: CPT

## 2022-08-10 PROCEDURE — 96374 THER/PROPH/DIAG INJ IV PUSH: CPT

## 2022-08-10 PROCEDURE — 80053 COMPREHEN METABOLIC PANEL: CPT

## 2022-08-10 PROCEDURE — 93010 ELECTROCARDIOGRAM REPORT: CPT | Performed by: INTERNAL MEDICINE

## 2022-08-10 PROCEDURE — 84484 ASSAY OF TROPONIN QUANT: CPT

## 2022-08-10 PROCEDURE — 96361 HYDRATE IV INFUSION ADD-ON: CPT

## 2022-08-10 PROCEDURE — 99285 EMERGENCY DEPT VISIT HI MDM: CPT | Performed by: EMERGENCY MEDICINE

## 2022-08-10 PROCEDURE — 83690 ASSAY OF LIPASE: CPT

## 2022-08-10 PROCEDURE — 93005 ELECTROCARDIOGRAM TRACING: CPT

## 2022-08-10 PROCEDURE — 99284 EMERGENCY DEPT VISIT MOD MDM: CPT

## 2022-08-10 RX ORDER — ACETAMINOPHEN 325 MG/1
650 TABLET ORAL ONCE
Status: COMPLETED | OUTPATIENT
Start: 2022-08-10 | End: 2022-08-10

## 2022-08-10 RX ORDER — MAGNESIUM HYDROXIDE/ALUMINUM HYDROXICE/SIMETHICONE 120; 1200; 1200 MG/30ML; MG/30ML; MG/30ML
30 SUSPENSION ORAL ONCE
Status: COMPLETED | OUTPATIENT
Start: 2022-08-10 | End: 2022-08-10

## 2022-08-10 RX ORDER — LIDOCAINE HYDROCHLORIDE 20 MG/ML
15 SOLUTION OROPHARYNGEAL ONCE
Status: COMPLETED | OUTPATIENT
Start: 2022-08-10 | End: 2022-08-10

## 2022-08-10 RX ORDER — PANTOPRAZOLE SODIUM 20 MG/1
40 TABLET, DELAYED RELEASE ORAL DAILY
Qty: 28 TABLET | Refills: 0 | Status: SHIPPED | OUTPATIENT
Start: 2022-08-10 | End: 2022-08-10 | Stop reason: SDUPTHER

## 2022-08-10 RX ORDER — KETOROLAC TROMETHAMINE 30 MG/ML
15 INJECTION, SOLUTION INTRAMUSCULAR; INTRAVENOUS ONCE
Status: COMPLETED | OUTPATIENT
Start: 2022-08-10 | End: 2022-08-10

## 2022-08-10 RX ORDER — ONDANSETRON 2 MG/ML
4 INJECTION INTRAMUSCULAR; INTRAVENOUS ONCE
Status: COMPLETED | OUTPATIENT
Start: 2022-08-10 | End: 2022-08-10

## 2022-08-10 RX ORDER — OMEPRAZOLE 20 MG/1
40 CAPSULE, DELAYED RELEASE ORAL DAILY
Qty: 28 CAPSULE | Refills: 0 | Status: SHIPPED | OUTPATIENT
Start: 2022-08-10 | End: 2022-08-24

## 2022-08-10 RX ADMIN — SODIUM CHLORIDE 1000 ML: 0.9 INJECTION, SOLUTION INTRAVENOUS at 08:18

## 2022-08-10 RX ADMIN — ONDANSETRON HYDROCHLORIDE 4 MG: 2 INJECTION, SOLUTION INTRAMUSCULAR; INTRAVENOUS at 08:08

## 2022-08-10 RX ADMIN — LIDOCAINE HYDROCHLORIDE 15 ML: 20 SOLUTION ORAL; TOPICAL at 08:08

## 2022-08-10 RX ADMIN — ACETAMINOPHEN 650 MG: 325 TABLET ORAL at 08:08

## 2022-08-10 RX ADMIN — KETOROLAC TROMETHAMINE 15 MG: 30 INJECTION, SOLUTION INTRAMUSCULAR; INTRAVENOUS at 08:08

## 2022-08-10 RX ADMIN — ALUMINUM HYDROXIDE, MAGNESIUM HYDROXIDE, AND SIMETHICONE 30 ML: 200; 200; 20 SUSPENSION ORAL at 08:08

## 2022-08-10 NOTE — Clinical Note
Adarsh Massey was seen and treated in our emergency department on 8/10/2022  Diagnosis:     Tunde Read  may return to work on return date  He may return on this date: 08/12/2022         If you have any questions or concerns, please don't hesitate to call        Deretha Dubin, MD    ______________________________           _______________          _______________  Hospital Representative                              Date                                Time

## 2022-08-10 NOTE — ED PROVIDER NOTES
History  Chief Complaint   Patient presents with    Abdominal Pain     Chronic stomach pain with a HX of pancreatitis  Gall bladder surgery 5 years ago  Symptoms started on Sunday and have progressed  50 yo M w/ PMHx chronic pancreatitis, MI w/ stent w/ cc abdominal pain Sunday, worsening, postprandial, epigastric, grabbing, stabbing pain, lasted longer than normal, 7/10, non-radiating, peptobismal for relief, feels like pancreatitis, no alcohol in 3 mo, nausea, no vomiting, normal BM  Hx of cholecystectomy  Prior to Admission Medications   Prescriptions Last Dose Informant Patient Reported? Taking?    BD PEN NEEDLE BYRON 2ND GEN 32G X 4 MM MISC   No No   Sig: Inject as directed 4 (four) times a day   Empagliflozin (Jardiance) 25 MG TABS 8/9/2022 at Unknown time  No Yes   Sig: Take 1 tablet (25 mg total) by mouth every morning   Multiple Vitamins-Minerals (MULTIVITAMIN WITH MINERALS) tablet Not Taking at Unknown time Self Yes No   Sig: Take 1 tablet by mouth daily   Patient not taking: Reported on 8/10/2022   Omega-3 Fatty Acids (FISH OIL) 1,000 mg 8/9/2022 at Unknown time Self Yes Yes   Sig: Take 1,000 mg by mouth daily   aspirin 81 mg chewable tablet 8/9/2022 at Unknown time Self No Yes   Sig: Chew 1 tablet (81 mg total) daily   atorvastatin (LIPITOR) 80 mg tablet 8/9/2022 at Unknown time  No Yes   Sig: TAKE 1 TABLET BY MOUTH ONCE DAILY AFTER SUPPER   fenofibrate (TRICOR) 145 mg tablet 8/9/2022 at Unknown time  No Yes   Sig: Take 1 tablet by mouth once daily   lisinopril (ZESTRIL) 20 mg tablet 8/9/2022 at Unknown time  No Yes   Sig: Take 1 tablet by mouth once daily   metFORMIN (GLUCOPHAGE) 500 mg tablet 8/9/2022 at Unknown time  No Yes   Sig: Take 1 tablet (500 mg total) by mouth 2 (two) times a day with meals   metoprolol succinate (TOPROL-XL) 25 mg 24 hr tablet Unknown at Unknown time  No No   Sig: Take 1 tablet by mouth once daily   nitroglycerin (NITROSTAT) 0 4 mg SL tablet  Self No No   Sig: Place 1 tablet (0 4 mg total) under the tongue every 5 (five) minutes as needed for chest pain   Patient not taking: Reported on 9/9/2021      Facility-Administered Medications: None       Past Medical History:   Diagnosis Date    Diabetes mellitus ([facility] [address] )     pre-diabetic     Hypertension     Pancreatitis     Portal vein thrombosis        Past Surgical History:   Procedure Laterality Date    ABDOMINAL SURGERY      CHOLECYSTECTOMY      CT EDG US EXAM SURGICAL ALTER STOM DUODENUM/JEJUNUM N/A 7/24/2017    Procedure: LINEAR ENDOSCOPIC U/S WITH AXIOS STENT;  Surgeon: Delio Waterman MD;  Location: [facility]; Service: Gastroenterology    CT EDG US EXAM SURGICAL ALTER STOM DUODENUM/JEJUNUM N/A 9/14/2017    Procedure: LINEAR ENDOSCOPIC U/S POSSIBLE AXIOS;  Surgeon: Delio Waterman MD;  Location: [facility]; Service: Gastroenterology    WISDOM TOOTH EXTRACTION      20 years ago       No family history on file  I have reviewed and agree with the history as documented  E-Cigarette/Vaping    E-Cigarette Use Never User      E-Cigarette/Vaping Substances     Social History     Tobacco Use    Smoking status: Never Smoker    Smokeless tobacco: Never Used   Vaping Use    Vaping Use: Never used   Substance Use Topics    Alcohol use: Yes     Comment: socially -rarely    Drug use: No        Review of Systems   Constitutional: Negative for chills and fever  HENT: Negative for ear pain and sore throat  Eyes: Negative for pain and visual disturbance  Respiratory: Negative for cough and shortness of breath  Cardiovascular: Negative for chest pain and palpitations  Gastrointestinal: Positive for abdominal pain  Negative for vomiting  Genitourinary: Negative for dysuria and hematuria  Musculoskeletal: Negative for arthralgias and back pain  Skin: Negative for color change and rash  Neurological: Negative for seizures and syncope  All other systems reviewed and are negative        Physical Exam  ED Triage Vitals   Temperature Pulse Respirations Blood Pressure SpO2   08/10/22 0705 08/10/22 0703 08/10/22 0703 08/10/22 0703 08/10/22 0703   97 5 °F (36 4 °C) 79 16 128/76 97 %      Temp Source Heart Rate Source Patient Position - Orthostatic VS BP Location FiO2 (%)   08/10/22 0705 08/10/22 0703 08/10/22 0703 08/10/22 0703 --   Oral Monitor Sitting Right arm       Pain Score       08/10/22 0706       8             Orthostatic Vital Signs  Vitals:    08/10/22 0703 08/10/22 0942   BP: 128/76 120/72   Pulse: 79 67   Patient Position - Orthostatic VS: Sitting Lying       Physical Exam  Vitals and nursing note reviewed  Constitutional:       General: He is in acute distress  Appearance: He is well-developed and normal weight  He is ill-appearing  He is not toxic-appearing or diaphoretic  HENT:      Head: Normocephalic and atraumatic  Eyes:      General: No scleral icterus  Conjunctiva/sclera: Conjunctivae normal    Cardiovascular:      Rate and Rhythm: Normal rate and regular rhythm  Heart sounds: Normal heart sounds  No murmur heard  Pulmonary:      Effort: Pulmonary effort is normal  No respiratory distress  Breath sounds: Normal breath sounds  No wheezing  Abdominal:      General: Abdomen is flat  Bowel sounds are normal  There is no distension  Palpations: Abdomen is soft  Tenderness: There is abdominal tenderness in the epigastric area  There is no right CVA tenderness, left CVA tenderness, guarding or rebound  Negative signs include Rose's sign  Musculoskeletal:      Cervical back: Neck supple  Skin:     General: Skin is warm and dry  Capillary Refill: Capillary refill takes less than 2 seconds  Neurological:      Mental Status: He is alert and oriented to person, place, and time     Psychiatric:         Mood and Affect: Mood normal          Behavior: Behavior normal          ED Medications  Medications   ondansetron (ZOFRAN) injection 4 mg (4 mg Intravenous Given 8/10/22 8985)   acetaminophen (TYLENOL) tablet 650 mg (650 mg Oral Given 8/10/22 0808)   ketorolac (TORADOL) injection 15 mg (15 mg Intramuscular Given 8/10/22 0808)   sodium chloride 0 9 % bolus 1,000 mL (0 mL Intravenous Stopped 8/10/22 0916)   aluminum-magnesium hydroxide-simethicone (MYLANTA) oral suspension 30 mL (30 mL Oral Given 8/10/22 0808)   Lidocaine Viscous HCl (XYLOCAINE) 2 % mucosal solution 15 mL (15 mL Swish & Swallow Given 8/10/22 0808)       Diagnostic Studies  Results Reviewed     Procedure Component Value Units Date/Time    HS Troponin 0hr (reflex protocol) [[id number]]  (Normal) Collected: 08/10/22 0817    Lab Status: Final result Specimen: Blood from Arm, Left Updated: 08/10/22 0851     hs TnI 0hr 2 ng/L     Comprehensive metabolic panel [[id number]]  (Abnormal) Collected: 08/10/22 0817    Lab Status: Final result Specimen: Blood from Arm, Left Updated: 08/10/22 0846     Sodium 139 mmol/L      Potassium 4 1 mmol/L      Chloride 111 mmol/L      CO2 23 mmol/L      ANION GAP 5 mmol/L      BUN 22 mg/dL      Creatinine 1 11 mg/dL      Glucose 176 mg/dL      Calcium 8 8 mg/dL      AST 30 U/L      ALT 64 U/L      Alkaline Phosphatase 65 U/L      Total Protein 7 6 g/dL      Albumin 4 0 g/dL      Total Bilirubin 0 42 mg/dL      eGFR 78 ml/min/1 73sq m     Narrative:      Adryan guidelines for Chronic Kidney Disease (CKD):     Stage 1 with normal or high GFR (GFR > 90 mL/min/1 73 square meters)    Stage 2 Mild CKD (GFR = 60-89 mL/min/1 73 square meters)    Stage 3A Moderate CKD (GFR = 45-59 mL/min/1 73 square meters)    Stage 3B Moderate CKD (GFR = 30-44 mL/min/1 73 square meters)    Stage 4 Severe CKD (GFR = 15-29 mL/min/1 73 square meters)    Stage 5 End Stage CKD (GFR <15 mL/min/1 73 square meters)  Note: GFR calculation is accurate only with a steady state creatinine    Lipase [[id number]]  (Normal) Collected: 08/10/22 0817    Lab Status: Final result Specimen: Blood from Arm, Left Updated: 08/10/22 0846     Lipase 89 u/L     CBC and differential [[id number]]  (Abnormal) Collected: 08/10/22 0817    Lab Status: Final result Specimen: Blood from Arm, Left Updated: 08/10/22 0828     WBC 5 55 Thousand/uL      RBC 5 83 Million/uL      Hemoglobin 15 5 g/dL      Hematocrit 47 7 %      MCV 82 fL      MCH 26 6 pg      MCHC 32 5 g/dL      RDW 13 2 %      MPV 11 3 fL      Platelets 084 Thousands/uL      nRBC 0 /100 WBCs      Neutrophils Relative 63 %      Immat GRANS % 1 %      Lymphocytes Relative 17 %      Monocytes Relative 8 %      Eosinophils Relative 10 %      Basophils Relative 1 %      Neutrophils Absolute 3 59 Thousands/µL      Immature Grans Absolute 0 03 Thousand/uL      Lymphocytes Absolute 0 92 Thousands/µL      Monocytes Absolute 0 43 Thousand/µL      Eosinophils Absolute 0 55 Thousand/µL      Basophils Absolute 0 03 Thousands/µL                  No orders to display         Procedures  ECG 12 Lead Documentation Only    Date/Time: 8/10/2022 1:15 PM  Performed by: Nikki Nagel MD  Authorized by: Gino Anna MD     Indications / Diagnosis:  Epigastric pain  ECG reviewed by me, the ED Provider: yes    Patient location:  ED  Previous ECG:     Previous ECG:  Compared to current    Similarity:  No change    Comparison to cardiac monitor: Yes    Interpretation:     Interpretation: normal    Rate:     ECG rate:  65    ECG rate assessment: normal    Rhythm:     Rhythm: sinus rhythm    Ectopy:     Ectopy: none    QRS:     QRS axis:  Normal    QRS intervals:  Normal  Conduction:     Conduction: normal    ST segments:     ST segments:  Normal  T waves:     T waves: normal            ED Course                             SBIRT 22yo+    Flowsheet Row Most Recent Value   SBIRT (23 yo +)    In order to provide better care to our patients, we are screening all of our patients for alcohol and drug use  Would it be okay to ask you these screening questions?  Yes Filed at: 08/10/2022 3098 Initial Alcohol Screen: US AUDIT-C     1  How often do you have a drink containing alcohol? 1 Filed at: 08/10/2022 0737   2  How many drinks containing alcohol do you have on a typical day you are drinking? 1 Filed at: 08/10/2022 0737   3a  Male UNDER 65: How often do you have five or more drinks on one occasion? 1 Filed at: 08/10/2022 0737   Audit-C Score 3 Filed at: 08/10/2022 0110   XIOMARA: How many times in the past year have you    Used an illegal drug or used a prescription medication for non-medical reasons? Never Filed at: 08/10/2022 0737                MDM  Number of Diagnoses or Management Options  Epigastric pain: established and worsening  Diagnosis management comments: Impression: 48 yo M w/ hx of chronic pancreatitis presents w/ abdominal pain  Ddx include pancreatitis, gastritis, ACS  Plan: cardiac workup, belly labs, analgesia    Workup negative for ACS, pancreatitis       Amount and/or Complexity of Data Reviewed  Clinical lab tests: ordered and reviewed  Tests in the radiology section of CPT®: ordered and reviewed  Review and summarize past medical records: yes  Independent visualization of images, tracings, or specimens: yes    Risk of Complications, Morbidity, and/or Mortality  Presenting problems: moderate  Diagnostic procedures: low  Management options: low    Patient Progress  Patient progress: improved      Disposition  Final diagnoses:   Epigastric pain     Time reflects when diagnosis was documented in both MDM as applicable and the Disposition within this note     Time User Action Codes Description Comment    8/10/2022  9:58 AM Patricio Madera Add [R10 13] Epigastric pain       ED Disposition     ED Disposition   Discharge    Condition   Stable    Date/Time   Wed Aug 10, 2022  9:58 AM    Comment   Nicola Henderson discharge to home/self care                 Follow-up Information     Follow up With Specialties Details Why [address], DO Family Medicine Call  If symptoms worsen [address]   [phone]            Discharge Medication List as of 8/10/2022 10:02 AM      START taking these medications    Details   omeprazole (PriLOSEC) 20 mg delayed release capsule Take 2 capsules (40 mg total) by mouth daily for 14 days, Starting Wed 8/10/2022, Until Wed 8/24/2022, Normal         CONTINUE these medications which have NOT CHANGED    Details   aspirin 81 mg chewable tablet Chew 1 tablet (81 mg total) daily, Starting Thu 12/26/2019, Until Wed 8/10/2022, Normal      atorvastatin (LIPITOR) 80 mg tablet TAKE 1 TABLET BY MOUTH ONCE DAILY AFTER SUPPER, Normal      Empagliflozin (Jardiance) 25 MG TABS Take 1 tablet (25 mg total) by mouth every morning, Starting Thu 9/9/2021, Normal      fenofibrate (TRICOR) 145 mg tablet Take 1 tablet by mouth once daily, Normal      lisinopril (ZESTRIL) 20 mg tablet Take 1 tablet by mouth once daily, Normal      metFORMIN (GLUCOPHAGE) 500 mg tablet Take 1 tablet (500 mg total) by mouth 2 (two) times a day with meals, Starting Thu 9/9/2021, Normal      Omega-3 Fatty Acids (FISH OIL) 1,000 mg Take 1,000 mg by mouth daily, Historical Med      BD PEN NEEDLE BYRON 2ND GEN 32G X 4 MM MISC Inject as directed 4 (four) times a day, Starting Fri 2/7/2020, Normal      metoprolol succinate (TOPROL-XL) 25 mg 24 hr tablet Take 1 tablet by mouth once daily, Normal      Multiple Vitamins-Minerals (MULTIVITAMIN WITH MINERALS) tablet Take 1 tablet by mouth daily, Historical Med      nitroglycerin (NITROSTAT) 0 4 mg SL tablet Place 1 tablet (0 4 mg total) under the tongue every 5 (five) minutes as needed for chest pain, Starting Thu 12/26/2019, Until Sat 1/25/2020, Normal           No discharge procedures on file  PDMP Review     None           ED Provider  Attending physically available and evaluated Loc [place]  LORENE managed the patient along with the ED Attending      Electronically Signed by         Nikki Nagel MD  08/12/22 6971

## 2022-08-10 NOTE — DISCHARGE INSTRUCTIONS
medications from pharmacy  Return to ER or call your family doctor if symptoms do not improve in 2-3 days or get worse

## 2022-08-10 NOTE — ED ATTENDING ATTESTATION
Final Diagnosis:  1  Epigastric pain           I, Elle Hill MD, saw and evaluated the patient  All available labs and X-rays were ordered by me or the resident and have been reviewed by myself  I discussed the patient with the resident / non-physician and agree with the resident's / non-physician practitioner's findings and plan as documented in the resident's / non-physician practicitioner's note, except where noted  At this point, I agree with the current assessment done in the ED  I was present during key portions of all procedures performed unless otherwise stated  Chief Complaint   Patient presents with    Abdominal Pain     Chronic stomach pain with a HX of pancreatitis  Gall bladder surgery 5 years ago  Symptoms started on Sunday and have progressed  This is a 49 y o  male presenting for evaluation of abdominal pain  The patient 5 years ago had pancreatitis; had cholecystectomy and PEG tube for it but resolved after a month  Also had a pancreatic cyst that was drained  Hx of alcohol use, nothing in the last 3 months  No f/ch/n/v/cp/sob  Denies any urinary tract infection symptoms (burning, itching, pain, blood, frequency)  Denies any urinary tract infection symptoms (burning, itching, pain, blood, frequency)  No diarrhea  No NSAID use/abuse  No K+ supplements  PMH:  ACS s/p stenting years ago  has a past medical history of Diabetes mellitus ([address] ), Hypertension, Pancreatitis, and Portal vein thrombosis  PSH:   has a past surgical history that includes Abdominal surgery; Cholecystectomy; pr edg us exam surgical alter stom duodenum/jejunum (N/A, 7/24/2017); pr edg us exam surgical alter stom duodenum/jejunum (N/A, 9/14/2017); and [place] tooth extraction      Social:  Social History     Substance and Sexual Activity   Alcohol Use Yes    Comment: socially -rarely     Social History     Tobacco Use   Smoking Status Never Smoker   Smokeless Tobacco Never Used     Social History Substance and Sexual Activity   Drug Use No     PE:  Vitals:    08/10/22 0703 08/10/22 0705 08/10/22 0942   BP: 128/76  120/72   BP Location: Right arm  Right arm   Pulse: 79  67   Resp: 16  15   Temp:  97 5 °F (36 4 °C)    TempSrc:  Oral    SpO2: 97%  97%   General: VSS, NAD, awake, alert  Well-nourished, well-developed  Appears stated age  Head: Normocephalic, atraumatic, nontender  Eyes: PERRL, EOM-I  No diplopia  No hyphema  No subconjunctival hemorrhages  Symmetrical lids  ENTAtraumatic external nose and ears  MMM  No stridor  Normal phonation  No drooling  Base of mouth is soft  No mastoid tenderness  Neck: Symmetric, trachea midline  No JVD  CV: Peripheral pulses +2 throughout  No chest wall tenderness  Lungs:   Unlabored   No retractions  No crepitus  No tachypnea  No paradoxical motion  Abd: +BS, soft, epigastric tenderness focally  Minimal RUQ/LUQ tenderness  Lower belly is non-tender  No guarding  No rigidity  No rebound  No peritoneal signs  MSK:   FROM   Back:   No CVAT  Skin: Dry, intact  Neuro: AAOx3, GCS 15, CN II-XII grossly intact  Motor grossly intact  Psychiatric/Behavioral: Appropriate mood and affect   Exam: deferred  A:  - Epigastric pain  P:  - labs  - symptomatic measures ? I think that this could be gastritis vs pancreatitis; pancreatitis primarily b/c of hx of it   - discussed trying symptomatic measures  - b/c of his hx of ACS, I think trop/EKG would be reasonable  ? symptoms have been there since Sunday, so single troponin would be alright   - disposition per workup  - might add CT dependeing on results or lack of response to interventions / pain control  - 13 point ROS was performed and all are normal unless stated in the history above  - Nursing note reviewed  Vitals reviewed     - Orders placed by myself and/or advanced practitioner / resident     - Previous chart was reviewed  - No language barrier    - History obtained from patient  - There are no limitations to the history obtained  - Critical care time: Not applicable for this patient       Code Status: Prior  Advance Directive and Living Will:      Power of :    POLST:      Medications   ondansetron (ZOFRAN) injection 4 mg (4 mg Intravenous Given 8/10/22 0808)   acetaminophen (TYLENOL) tablet 650 mg (650 mg Oral Given 8/10/22 0808)   ketorolac (TORADOL) injection 15 mg (15 mg Intramuscular Given 8/10/22 0808)   sodium chloride 0 9 % bolus 1,000 mL (0 mL Intravenous Stopped 8/10/22 0916)   aluminum-magnesium hydroxide-simethicone (MYLANTA) oral suspension 30 mL (30 mL Oral Given 8/10/22 0808)   Lidocaine Viscous HCl (XYLOCAINE) 2 % mucosal solution 15 mL (15 mL Swish & Swallow Given 8/10/22 0808)     No orders to display     Orders Placed This Encounter   Procedures    ED ECG Documentation Only    CBC and differential    Comprehensive metabolic panel    Lipase    HS Troponin 0hr (reflex protocol)    ECG 12 lead    ECG 12 lead     Labs Reviewed   CBC AND DIFFERENTIAL - Abnormal       Result Value Ref Range Status    WBC 5 55  4 31 - 10 16 Thousand/uL Final    RBC 5 83 (*) 3 88 - 5 62 Million/uL Final    Hemoglobin 15 5  12 0 - 17 0 g/dL Final    Hematocrit 47 7  36 5 - 49 3 % Final    MCV 82  82 - 98 fL Final    MCH 26 6 (*) 26 8 - 34 3 pg Final    MCHC 32 5  31 4 - 37 4 g/dL Final    RDW 13 2  11 6 - 15 1 % Final    MPV 11 3  8 9 - 12 7 fL Final    Platelets 819  789 - 390 Thousands/uL Final    nRBC 0  /100 WBCs Final    Neutrophils Relative 63  43 - 75 % Final    Immat GRANS % 1  0 - 2 % Final    Lymphocytes Relative 17  14 - 44 % Final    Monocytes Relative 8  4 - 12 % Final    Eosinophils Relative 10 (*) 0 - 6 % Final    Basophils Relative 1  0 - 1 % Final    Neutrophils Absolute 3 59  1 85 - 7 62 Thousands/µL Final    Immature Grans Absolute 0 03  0 00 - 0 20 Thousand/uL Final    Lymphocytes Absolute 0 92  0 60 - 4 47 Thousands/µL Final    Monocytes Absolute 0 43  0 17 - 1 22 Thousand/µL Final    Eosinophils Absolute 0 55  0 00 - 0 61 Thousand/µL Final    Basophils Absolute 0 03  0 00 - 0 10 Thousands/µL Final   COMPREHENSIVE METABOLIC PANEL - Abnormal    Sodium 139  135 - 147 mmol/L Final    Potassium 4 1  3 5 - 5 3 mmol/L Final    Comment: Slightly Hemolyzed; Results May be Affected    Chloride 111 (*) 96 - 108 mmol/L Final    CO2 23  21 - 32 mmol/L Final    ANION GAP 5  4 - 13 mmol/L Final    BUN 22  5 - 25 mg/dL Final    Creatinine 1 11  0 60 - 1 30 mg/dL Final    Comment: Standardized to IDMS reference method    Glucose 176 (*) 65 - 140 mg/dL Final    Comment: If the patient is fasting, the ADA then defines impaired fasting glucose as > 100 mg/dL and diabetes as > or equal to 123 mg/dL  Specimen collection should occur prior to Sulfasalazine administration due to the potential for falsely depressed results  Specimen collection should occur prior to Sulfapyridine administration due to the potential for falsely elevated results  Calcium 8 8  8 3 - 10 1 mg/dL Final    AST 30  5 - 45 U/L Final    Comment: Slightly Hemolyzed; Results May be Affected  Specimen collection should occur prior to Sulfasalazine administration due to the potential for falsely depressed results  ALT 64  12 - 78 U/L Final    Comment: Specimen collection should occur prior to Sulfasalazine and/or Sulfapyridine administration due to the potential for falsely depressed results  Alkaline Phosphatase 65  46 - 116 U/L Final    Total Protein 7 6  6 4 - 8 4 g/dL Final    Albumin 4 0  3 5 - 5 0 g/dL Final    Total Bilirubin 0 42  0 20 - 1 00 mg/dL Final    Comment: Use of this assay is not recommended for patients undergoing treatment with eltrombopag due to the potential for falsely elevated results      eGFR 78  ml/min/1 73sq m Final    Narrative:     [place] guidelines for Chronic Kidney Disease (CKD):     Stage 1 with normal or high GFR (GFR > 90 mL/min/1 73 square meters)    Stage 2 Mild CKD (GFR = 60-89 mL/min/1 73 square meters)    Stage 3A Moderate CKD (GFR = 45-59 mL/min/1 73 square meters)    Stage 3B Moderate CKD (GFR = 30-44 mL/min/1 73 square meters)    Stage 4 Severe CKD (GFR = 15-29 mL/min/1 73 square meters)    Stage 5 End Stage CKD (GFR <15 mL/min/1 73 square meters)  Note: GFR calculation is accurate only with a steady state creatinine   LIPASE - Normal    Lipase 89  73 - 393 u/L Final   HS TROPONIN I 0HR - Normal    hs TnI 0hr 2  "Refer to ACS Flowchart"- see link ng/L Final    Comment:                                              Initial (time 0) result  If >=50 ng/L, Myocardial injury suggested ;  Type of myocardial injury and treatment strategy  to be determined  If 5-49 ng/L, a delta result at 2 hours and or 4 hours will be needed to further evaluate  If <4 ng/L, and chest pain has been >3 hours since onset, patient may qualify for discharge based on the HEART score in the ED  If <5 ng/L and <3hours since onset of chest pain, a delta result at 2 hours will be needed to further evaluate  HS Troponin 99th Percentile URL of a Health Population=12 ng/L with a 95% Confidence Interval of 8-18 ng/L  Second Troponin (time 2 hours)  If calculated delta >= 20 ng/L,  Myocardial injury suggested ; Type of myocardial injury and treatment strategy to be determined  If 5-49 ng/L and the calculated delta is 5-19 ng/L, consult medical service for evaluation  Continue evaluation for ischemia on ecg and other possible etiology and repeat hs troponin at 4 hours  If delta is <5 ng/L at 2 hours, consider discharge based on risk stratification via the HEART score (if in ED), or GM risk score in IP/Observation  HS Troponin 99th Percentile URL of a Health Population=12 ng/L with a 95% Confidence Interval of 8-18 ng/L       Time reflects when diagnosis was documented in both MDM as applicable and the Disposition within this note       Time User Action Codes Description Comment    8/10/2022  9:58 AM Kelsea London Add [R10 13] Epigastric pain           ED Disposition       ED Disposition   Discharge    Condition   Stable    Date/Time   Wed Aug 10, 2022  9:58 AM    Comment   [facility] discharge to home/self care                     Follow-up Information       Follow up With Specialties Details Why Contact Karan Marshall, DO Family Medicine Call  If symptoms worsen [address]   [phone]            Discharge Medication List as of 8/10/2022 10:02 AM        START taking these medications    Details   omeprazole (PriLOSEC) 20 mg delayed release capsule Take 2 capsules (40 mg total) by mouth daily for 14 days, Starting Wed 8/10/2022, Until Wed 8/24/2022, Normal           CONTINUE these medications which have NOT CHANGED    Details   aspirin 81 mg chewable tablet Chew 1 tablet (81 mg total) daily, Starting u 12/26/2019, Until Wed 8/10/2022, Normal      atorvastatin (LIPITOR) 80 mg tablet TAKE 1 TABLET BY MOUTH ONCE DAILY AFTER SUPPER, Normal      Empagliflozin (Jardiance) 25 MG TABS Take 1 tablet (25 mg total) by mouth every morning, Starting Thu 9/9/2021, Normal      fenofibrate (TRICOR) 145 mg tablet Take 1 tablet by mouth once daily, Normal      lisinopril (ZESTRIL) 20 mg tablet Take 1 tablet by mouth once daily, Normal      metFORMIN (GLUCOPHAGE) 500 mg tablet Take 1 tablet (500 mg total) by mouth 2 (two) times a day with meals, Starting Thu 9/9/2021, Normal      Omega-3 Fatty Acids (FISH OIL) 1,000 mg Take 1,000 mg by mouth daily, Historical Med      BD PEN NEEDLE BYRON 2ND GEN 32G X 4 MM MISC Inject as directed 4 (four) times a day, Starting Fri 2/7/2020, Normal      metoprolol succinate (TOPROL-XL) 25 mg 24 hr tablet Take 1 tablet by mouth once daily, Normal      Multiple Vitamins-Minerals (MULTIVITAMIN WITH MINERALS) tablet Take 1 tablet by mouth daily, Historical Med      nitroglycerin (NITROSTAT) 0 4 mg SL tablet Place 1 tablet (0 4 mg total) under the tongue every 5 (five) minutes as needed for chest pain, Starting Thu 12/26/2019, Until Sat 1/25/2020, Normal           No discharge procedures on file  Prior to Admission Medications   Prescriptions Last Dose Informant Patient Reported? Taking? BD PEN NEEDLE BYRON 2ND GEN 32G X 4 MM MISC   No No   Sig: Inject as directed 4 (four) times a day   Empagliflozin (Jardiance) 25 MG TABS 8/9/2022 at Unknown time  No Yes   Sig: Take 1 tablet (25 mg total) by mouth every morning   Multiple Vitamins-Minerals (MULTIVITAMIN WITH MINERALS) tablet Not Taking at Unknown time Self Yes No   Sig: Take 1 tablet by mouth daily   Patient not taking: Reported on 8/10/2022   Omega-3 Fatty Acids (FISH OIL) 1,000 mg 8/9/2022 at Unknown time Self Yes Yes   Sig: Take 1,000 mg by mouth daily   aspirin 81 mg chewable tablet 8/9/2022 at Unknown time Self No Yes   Sig: Chew 1 tablet (81 mg total) daily   atorvastatin (LIPITOR) 80 mg tablet 8/9/2022 at Unknown time  No Yes   Sig: TAKE 1 TABLET BY MOUTH ONCE DAILY AFTER SUPPER   fenofibrate (TRICOR) 145 mg tablet 8/9/2022 at Unknown time  No Yes   Sig: Take 1 tablet by mouth once daily   lisinopril (ZESTRIL) 20 mg tablet 8/9/2022 at Unknown time  No Yes   Sig: Take 1 tablet by mouth once daily   metFORMIN (GLUCOPHAGE) 500 mg tablet 8/9/2022 at Unknown time  No Yes   Sig: Take 1 tablet (500 mg total) by mouth 2 (two) times a day with meals   metoprolol succinate (TOPROL-XL) 25 mg 24 hr tablet Unknown at Unknown time  No No   Sig: Take 1 tablet by mouth once daily   nitroglycerin (NITROSTAT) 0 4 mg SL tablet  Self No No   Sig: Place 1 tablet (0 4 mg total) under the tongue every 5 (five) minutes as needed for chest pain   Patient not taking: Reported on 9/9/2021      Facility-Administered Medications: None       Portions of the record may have been created with voice recognition software   Occasional wrong word or "sound a like" substitutions may have occurred due to the inherent limitations of voice recognition software  Read the chart carefully and recognize, using context, where substitutions have occurred      Electronically signed by:  Matthew Amaya

## 2022-12-06 ENCOUNTER — OFFICE VISIT (OUTPATIENT)
Dept: CARDIOLOGY CLINIC | Facility: CLINIC | Age: 47
End: 2022-12-06

## 2022-12-06 VITALS
DIASTOLIC BLOOD PRESSURE: 72 MMHG | OXYGEN SATURATION: 98 % | SYSTOLIC BLOOD PRESSURE: 112 MMHG | WEIGHT: 199 LBS | HEIGHT: 70 IN | TEMPERATURE: 96.8 F | BODY MASS INDEX: 28.49 KG/M2 | HEART RATE: 77 BPM

## 2022-12-06 DIAGNOSIS — E78.5 DYSLIPIDEMIA: ICD-10-CM

## 2022-12-06 DIAGNOSIS — I25.10 CORONARY ARTERY DISEASE INVOLVING NATIVE CORONARY ARTERY OF NATIVE HEART WITHOUT ANGINA PECTORIS: Primary | ICD-10-CM

## 2022-12-06 RX ORDER — AMOXICILLIN AND CLAVULANATE POTASSIUM 875; 125 MG/1; MG/1
TABLET, FILM COATED ORAL
COMMUNITY
Start: 2022-12-01 | End: 2022-12-06

## 2022-12-06 NOTE — LETTER
December 6, 2022     Lola Arreaga MD  [address] [address]    Patient: Nicola Henderson   YOB: 1975   Date of Visit: 12/6/2022       Dear Dr Kacie Carrillo: Thank you for referring Gelacio Velazquez to me for evaluation  Below are my notes for this consultation  If you have questions, please do not hesitate to call me  I look forward to following your patient along with you  Sincerely,        Sarita Robins MD        CC: No Recipients  Sarita Robins MD  12/6/2022  2:10 PM  Sign when Signing Visit                                             Cardiology Follow Up    Nicola Henderson  1975  [id number]  [address] [facility]  [facility] [id number] [phone]    1  Coronary artery disease involving native coronary artery of native heart without angina pectoris  Lipid Panel with Direct LDL reflex      2  Dyslipidemia  Lipid Panel with Direct LDL reflex          Discussion/Summary:    29-year-old man with a history of a non ST-elevation MI involving the LAD in December of 2019  Ejection fraction was mildly reduced but most recently in November 2021 showed preserved LV function  Has hypertension, diabetes, dyslipidemia  Is on single antiplatelet therapy at this point  He is without any angina  EKG done in August was unremarkable  Blood pressure is controlled  No testing is indicated at this time other than the lipid panel which I reordered for him  He typically gets blood work done at [facility]    EF normalized a few years post MI  Volume status remains quite stable  Advised to increase aerobic activity  He does weights but is going to be joining the gym soon and plans to do more cardio as well          History of Present Illness:     29-year-old man  History of coronary disease with a non ST-elevation MI in December of 2019  Stent to the LAD  Otherwise, 50% disease in an OM 1      Ejection fraction 45% on echocardiogram  Has hypertension, diabetes  Triglycerides were significantly elevated, therefore in addition to high-intensity atorvastatin, he has been on fenofibrate  History of alcoholic pancreatitis  Interval history:    Returns for follow-up  Clinically continues to do very well  He has no chest pain or any shortness of breath  He has not gotten the lipid panel done that I requested last visit  He was in the emergency room in August  EKG was unremarkable at the time  He was having some epigastric pain he was concerned that there could be recurrence of the pancreatitis but this did not seem to be the case  Blood pressure remains controlled  Medical Problems     Problem List     Pancreatic pseudocyst    Alcohol-induced chronic pancreatitis (HCC)    Liver mass    Generalized abdominal pain    Overview Signed 12/18/2019  4:57 AM by Hunter Yen DO     Procedure/Onset: 10/16/2009         Eosinophilic esophagitis    Hypokalemia    Gastroesophageal reflux disease with esophagitis    Type 2 diabetes mellitus without complication, without long-term current use of insulin (HCC)      Lab Results   Component Value Date    HGBA1C 8 0 (A) 09/09/2021         Portal vein thrombosis    Dyslipidemia    Coronary artery disease involving native coronary artery of native heart without angina pectoris              Past Medical History:   Diagnosis Date   • Diabetes mellitus ([address] )     pre-diabetic    • Hypertension    • Pancreatitis    • Portal vein thrombosis      Social History     Tobacco Use   • Smoking status: Never   • Smokeless tobacco: Never   Vaping Use   • Vaping Use: Never used   Substance Use Topics   • Alcohol use: Yes     Comment: socially -rarely   • Drug use: No      History reviewed  No pertinent family history    Past Surgical History:   Procedure Laterality Date   • ABDOMINAL SURGERY     • CHOLECYSTECTOMY     • LA EDG US EXAM SURGICAL ALTER STOM DUODENUM/JEJUNUM N/A 7/24/2017    Procedure: LINEAR ENDOSCOPIC U/S WITH AXIOS STENT;  Surgeon: Olive Grewal MD;  Location: [facility]; Service: Gastroenterology   • NC EDG US EXAM SURGICAL ALTER STOM DUODENUM/JEJUNUM N/A 9/14/2017    Procedure: LINEAR ENDOSCOPIC U/S POSSIBLE AXIOS;  Surgeon: Olive Grewal MD;  Location: [facility];   Service: Gastroenterology   • WISDOM TOOTH EXTRACTION      20 years ago       Current Outpatient Medications:   •  aspirin 81 mg chewable tablet, Chew 1 tablet (81 mg total) daily, Disp: 90 each, Rfl: 0  •  atorvastatin (LIPITOR) 80 mg tablet, TAKE 1 TABLET BY MOUTH ONCE DAILY AFTER SUPPER, Disp: 90 tablet, Rfl: 3  •  BD PEN NEEDLE BYRON 2ND GEN 32G X 4 MM MISC, Inject as directed 4 (four) times a day, Disp: 360 each, Rfl: 1  •  Empagliflozin (Jardiance) 25 MG TABS, Take 1 tablet (25 mg total) by mouth every morning, Disp: 90 tablet, Rfl: 3  •  fenofibrate (TRICOR) 145 mg tablet, Take 1 tablet by mouth once daily, Disp: 90 tablet, Rfl: 3  •  lisinopril (ZESTRIL) 20 mg tablet, Take 1 tablet by mouth once daily, Disp: 90 tablet, Rfl: 3  •  metFORMIN (GLUCOPHAGE) 500 mg tablet, Take 1 tablet (500 mg total) by mouth 2 (two) times a day with meals, Disp: 180 tablet, Rfl: 3  •  Omega-3 Fatty Acids (FISH OIL) 1,000 mg, Take 1,000 mg by mouth daily, Disp: , Rfl:   •  Omega-3 Fatty Acids (fish oil) 1,000 mg, Take 1,000 mg by mouth, Disp: , Rfl:   •  metoprolol succinate (TOPROL-XL) 25 mg 24 hr tablet, Take 1 tablet by mouth once daily (Patient not taking: Reported on 12/6/2022), Disp: 90 tablet, Rfl: 0  •  Multiple Vitamins-Minerals (MULTIVITAMIN WITH MINERALS) tablet, Take 1 tablet by mouth daily (Patient not taking: Reported on 8/10/2022), Disp: , Rfl:   •  nitroglycerin (NITROSTAT) 0 4 mg SL tablet, Place 1 tablet (0 4 mg total) under the tongue every 5 (five) minutes as needed for chest pain (Patient not taking: Reported on 9/9/2021), Disp: 20 tablet, Rfl: 0  •  omeprazole (PriLOSEC) 20 mg delayed release capsule, Take 2 capsules (40 mg total) by mouth daily for 14 days (Patient not taking: Reported on 12/6/2022), Disp: 28 capsule, Rfl: 0  Allergies   Allergen Reactions   • Amoxicillin Rash       Vitals:    12/06/22 1349   BP: 112/72   BP Location: Right arm   Patient Position: Sitting   Cuff Size: Large   Pulse: 77   Temp: (!) 96 8 °F (36 °C)   TempSrc: Tympanic   SpO2: 98%   Weight: 90 3 kg (199 lb)   Height: 5' 10" (1 778 m)     Vitals:    12/06/22 1349   Weight: 90 3 kg (199 lb)      Height: 5' 10" (177 8 cm)   Body mass index is 28 55 kg/m²  Physical Exam:  GEN: Varsha Curry appears well, alert and oriented x 3, pleasant and cooperative   HEENT: pupils equal, round, and reactive to light; extraocular muscles intact  NECK: supple, no carotid bruits   HEART: regular rhythm, normal S1 and S2, no murmurs, clicks, gallops or rubs   LUNGS: clear to auscultation bilaterally; no wheezes, rales, or rhonchi   ABDOMEN: normal bowel sounds, soft, no tenderness, no distention  EXTREMITIES: peripheral pulses normal; no clubbing, cyanosis, or edema  NEURO: no focal findings   SKIN: normal without suspicious lesions on exposed skin      ROS:  Except as noted in HPI, is otherwise reviewed in detail and a 12 point review of systems is negative    ROS reviewed and is unchanged    Labs:  Lab Results   Component Value Date    SODIUM 139 08/10/2022    K 4 1 08/10/2022     (H) 08/10/2022    CREATININE 1 11 08/10/2022    BUN 22 08/10/2022    CO2 23 08/10/2022    ALT 64 08/10/2022    AST 30 08/10/2022    INR 0 96 12/18/2019    GLUF 108 (H) 05/15/2017    HGBA1C 8 0 (A) 09/09/2021    WBC 5 55 08/10/2022    HGB 15 5 08/10/2022    HCT 47 7 08/10/2022     08/10/2022       Lab Results   Component Value Date    CHOL 186 12/15/2014     Lab Results   Component Value Date    LDLCALC  12/18/2019      Comment:      Calculated LDL invalid, triglycerides >400 mg/dl    LDLCALC 114 (H) 12/15/2014     Lab Results   Component Value Date    HDL 37 (L) 12/18/2019    HDL 46 12/15/2014     Lab Results   Component Value Date    TRIG 841 (H) 12/18/2019    TRIG 129 12/15/2014       Testing:  Echo 11/23/2021:   Left Ventricle: Left ventricular cavity size is normal  The left ventricular ejection fraction is 60%  Systolic function is normal  Global longitudinal strain is mildly reduced at -17%  Diastolic function is normal   •  The following segments are hypokinetic: basal inferolateral and mid inferolateral   •  All other segments are normal       Echo 12/18/19:  LEFT VENTRICLE:  Systolic function was mildly reduced  Ejection fraction was estimated to be 45 %  There was dyskinesis of the mid-distal anteroseptal wall(s)  There was hypokinesis of the mid anteroseptal, mid inferoseptal, apical inferior, apical septal, and apical wall(s)  Doppler parameters were consistent with abnormal left ventricular relaxation (grade 1 diastolic dysfunction)  There was a false tendon within the ventricle, towards the apex      RIGHT VENTRICLE:  The size was normal   Systolic function was normal      MITRAL VALVE:  There was trace regurgitation      PULMONIC VALVE:  There was trace regurgitation      IVC, HEPATIC VEINS:  Respirophasic changes were blunted (less than 50% variation)  Cardiac Cath 12/18/19:  CORONARY CIRCULATION:  Mid LAD: There was a discrete 99 % stenosis  There was GM grade 2 flow through the vessel (partial perfusion)  This lesion is a likely culprit for the patient's recent myocardial infarction  An intervention was performed  1st obtuse marginal: There was a discrete 50 % stenosis  It appears amenable to percutaneous intervention      1ST LESION INTERVENTIONS:  A successful balloon angioplasty with stent procedure was performed on the 99 % lesion in the mid LAD  Following intervention there was an excellent angiographic appearance with a 0 % residual stenosis  A Orsi[employer] Rx 4 0 x 18mm drug-eluting stent was placed across the lesion and deployed at a maximum inflation pressure of 9 aris

## 2022-12-06 NOTE — PROGRESS NOTES
Cardiology Follow Up    Rambo Islas  1975  [id number]  [facility] [place] [id number] [phone]    1  Coronary artery disease involving native coronary artery of native heart without angina pectoris  Lipid Panel with Direct LDL reflex      2  Dyslipidemia  Lipid Panel with Direct LDL reflex          Discussion/Summary:    41-year-old man with a history of a non ST-elevation MI involving the LAD in December of 2019  Ejection fraction was mildly reduced but most recently in November 2021 showed preserved LV function  Has hypertension, diabetes, dyslipidemia  Is on single antiplatelet therapy at this point  He is without any angina  EKG done in August was unremarkable  Blood pressure is controlled  No testing is indicated at this time other than the lipid panel which I reordered for him  He typically gets blood work done at [facility]    EF normalized a few years post MI  Volume status remains quite stable  Advised to increase aerobic activity  He does weights but is going to be joining the gym soon and plans to do more cardio as well          History of Present Illness:     41-year-old man  History of coronary disease with a non ST-elevation MI in December of 2019  Stent to the LAD  Otherwise, 50% disease in an OM 1  Ejection fraction 45% on echocardiogram       Has hypertension, diabetes  Triglycerides were significantly elevated, therefore in addition to high-intensity atorvastatin, he has been on fenofibrate  History of alcoholic pancreatitis  Interval history:    Returns for follow-up  Clinically continues to do very well  He has no chest pain or any shortness of breath  He has not gotten the lipid panel done that I requested last visit  He was in the emergency room in August  EKG was unremarkable at the time   He was having some epigastric pain he was concerned that there could be recurrence of the pancreatitis but this did not seem to be the case  Blood pressure remains controlled  Medical Problems     Problem List     Pancreatic pseudocyst    Alcohol-induced chronic pancreatitis (HCC)    Liver mass    Generalized abdominal pain    Overview Signed 12/18/2019  4:57 AM by Lion Verdugo DO     Procedure/Onset: 10/16/2009         Eosinophilic esophagitis    Hypokalemia    Gastroesophageal reflux disease with esophagitis    Type 2 diabetes mellitus without complication, without long-term current use of insulin (HCC)      Lab Results   Component Value Date    HGBA1C 8 0 (A) 09/09/2021         Portal vein thrombosis    Dyslipidemia    Coronary artery disease involving native coronary artery of native heart without angina pectoris              Past Medical History:   Diagnosis Date   • Diabetes mellitus ([facility] [address] )     pre-diabetic    • Hypertension    • Pancreatitis    • Portal vein thrombosis      Social History     Tobacco Use   • Smoking status: Never   • Smokeless tobacco: Never   Vaping Use   • Vaping Use: Never used   Substance Use Topics   • Alcohol use: Yes     Comment: socially -rarely   • Drug use: No      History reviewed  No pertinent family history  Past Surgical History:   Procedure Laterality Date   • ABDOMINAL SURGERY     • CHOLECYSTECTOMY     • [place] EDG US EXAM SURGICAL ALTER STOM DUODENUM/JEJUNUM N/A 7/24/2017    Procedure: LINEAR ENDOSCOPIC U/S WITH AXIOS STENT;  Surgeon: Nilesh Adame MD;  Location: [facility]; Service: Gastroenterology   • [place] EDG US EXAM SURGICAL ALTER STOM DUODENUM/JEJUNUM N/A 9/14/2017    Procedure: LINEAR ENDOSCOPIC U/S POSSIBLE AXIOS;  Surgeon: Nilesh Adame MD;  Location: [facility];   Service: Gastroenterology   • WISDOM TOOTH EXTRACTION      20 years ago       Current Outpatient Medications:   •  aspirin 81 mg chewable tablet, Chew 1 tablet (81 mg total) daily, Disp: 90 each, Rfl: 0  •  atorvastatin (LIPITOR) 80 mg tablet, TAKE 1 TABLET BY MOUTH ONCE DAILY AFTER SUPPER, Disp: 90 tablet, Rfl: 3  •  BD PEN NEEDLE BYRON 2ND GEN 32G X 4 MM MISC, Inject as directed 4 (four) times a day, Disp: 360 each, Rfl: 1  •  Empagliflozin (Jardiance) 25 MG TABS, Take 1 tablet (25 mg total) by mouth every morning, Disp: 90 tablet, Rfl: 3  •  fenofibrate (TRICOR) 145 mg tablet, Take 1 tablet by mouth once daily, Disp: 90 tablet, Rfl: 3  •  lisinopril (ZESTRIL) 20 mg tablet, Take 1 tablet by mouth once daily, Disp: 90 tablet, Rfl: 3  •  metFORMIN (GLUCOPHAGE) 500 mg tablet, Take 1 tablet (500 mg total) by mouth 2 (two) times a day with meals, Disp: 180 tablet, Rfl: 3  •  Omega-3 Fatty Acids (FISH OIL) 1,000 mg, Take 1,000 mg by mouth daily, Disp: , Rfl:   •  Omega-3 Fatty Acids (fish oil) 1,000 mg, Take 1,000 mg by mouth, Disp: , Rfl:   •  metoprolol succinate (TOPROL-XL) 25 mg 24 hr tablet, Take 1 tablet by mouth once daily (Patient not taking: Reported on 12/6/2022), Disp: 90 tablet, Rfl: 0  •  Multiple Vitamins-Minerals (MULTIVITAMIN WITH MINERALS) tablet, Take 1 tablet by mouth daily (Patient not taking: Reported on 8/10/2022), Disp: , Rfl:   •  nitroglycerin (NITROSTAT) 0 4 mg SL tablet, Place 1 tablet (0 4 mg total) under the tongue every 5 (five) minutes as needed for chest pain (Patient not taking: Reported on 9/9/2021), Disp: 20 tablet, Rfl: 0  •  omeprazole (PriLOSEC) 20 mg delayed release capsule, Take 2 capsules (40 mg total) by mouth daily for 14 days (Patient not taking: Reported on 12/6/2022), Disp: 28 capsule, Rfl: 0  Allergies   Allergen Reactions   • Amoxicillin Rash       Vitals:    12/06/22 1349   BP: 112/72   BP Location: Right arm   Patient Position: Sitting   Cuff Size: Large   Pulse: 77   Temp: (!) 96 8 °F (36 °C)   TempSrc: Tympanic   SpO2: 98%   Weight: 90 3 kg (199 lb)   Height: 5' 10" (1 778 m)     Vitals:    12/06/22 1349   Weight: 90 3 kg (199 lb)      Height: 5' 10" (177 8 cm)   Body mass index is 28 55 kg/m²      Physical Exam:  GEN: Irene Boogie appears well, alert and oriented x 3, pleasant and cooperative   HEENT: pupils equal, round, and reactive to light; extraocular muscles intact  NECK: supple, no carotid bruits   HEART: regular rhythm, normal S1 and S2, no murmurs, clicks, gallops or rubs   LUNGS: clear to auscultation bilaterally; no wheezes, rales, or rhonchi   ABDOMEN: normal bowel sounds, soft, no tenderness, no distention  EXTREMITIES: peripheral pulses normal; no clubbing, cyanosis, or edema  NEURO: no focal findings   SKIN: normal without suspicious lesions on exposed skin      ROS:  Except as noted in HPI, is otherwise reviewed in detail and a 12 point review of systems is negative  ROS reviewed and is unchanged    Labs:  Lab Results   Component Value Date    SODIUM 139 08/10/2022    K 4 1 08/10/2022     (H) 08/10/2022    CREATININE 1 11 08/10/2022    BUN 22 08/10/2022    CO2 23 08/10/2022    ALT 64 08/10/2022    AST 30 08/10/2022    INR 0 96 12/18/2019    GLUF 108 (H) 05/15/2017    HGBA1C 8 0 (A) 09/09/2021    WBC 5 55 08/10/2022    HGB 15 5 08/10/2022    HCT 47 7 08/10/2022     08/10/2022       Lab Results   Component Value Date    CHOL 186 12/15/2014     Lab Results   Component Value Date    LDLCALC  12/18/2019      Comment:      Calculated LDL invalid, triglycerides >400 mg/dl    LDLCALC 114 (H) 12/15/2014     Lab Results   Component Value Date    HDL 37 (L) 12/18/2019    HDL 46 12/15/2014     Lab Results   Component Value Date    TRIG 841 (H) 12/18/2019    TRIG 129 12/15/2014       Testing:  Echo 11/23/2021:   Left Ventricle: Left ventricular cavity size is normal  The left ventricular ejection fraction is 60%  Systolic function is normal  Global longitudinal strain is mildly reduced at -17%  Diastolic function is normal   •  The following segments are hypokinetic: basal inferolateral and mid inferolateral   •  All other segments are normal       Echo 12/18/19:  LEFT VENTRICLE:  Systolic function was mildly reduced  Ejection fraction was estimated to be 45 %  There was dyskinesis of the mid-distal anteroseptal wall(s)  There was hypokinesis of the mid anteroseptal, mid inferoseptal, apical inferior, apical septal, and apical wall(s)  Doppler parameters were consistent with abnormal left ventricular relaxation (grade 1 diastolic dysfunction)  There was a false tendon within the ventricle, towards the apex      RIGHT VENTRICLE:  The size was normal   Systolic function was normal      MITRAL VALVE:  There was trace regurgitation      PULMONIC VALVE:  There was trace regurgitation      IVC, HEPATIC VEINS:  Respirophasic changes were blunted (less than 50% variation)  Cardiac Cath 12/18/19:  CORONARY CIRCULATION:  Mid LAD: There was a discrete 99 % stenosis  There was GM grade 2 flow through the vessel (partial perfusion)  This lesion is a likely culprit for the patient's recent myocardial infarction  An intervention was performed  1st obtuse marginal: There was a discrete 50 % stenosis  It appears amenable to percutaneous intervention      1ST LESION INTERVENTIONS:  A successful balloon angioplasty with stent procedure was performed on the 99 % lesion in the mid LAD  Following intervention there was an excellent angiographic appearance with a 0 % residual stenosis  A Orsiro Rx 4 0 x 18mm drug-eluting stent was placed across the lesion and deployed at a maximum inflation pressure of 9 aris

## 2022-12-12 DIAGNOSIS — I21.4 NSTEMI (NON-ST ELEVATED MYOCARDIAL INFARCTION) (HCC): ICD-10-CM

## 2022-12-12 RX ORDER — ATORVASTATIN CALCIUM 80 MG/1
80 TABLET, FILM COATED ORAL DAILY
Qty: 90 TABLET | Refills: 3 | Status: SHIPPED | OUTPATIENT
Start: 2022-12-12

## 2022-12-12 RX ORDER — LISINOPRIL 20 MG/1
20 TABLET ORAL DAILY
Qty: 90 TABLET | Refills: 3 | Status: SHIPPED | OUTPATIENT
Start: 2022-12-12

## 2022-12-12 RX ORDER — FENOFIBRATE 145 MG/1
145 TABLET, COATED ORAL DAILY
Qty: 90 TABLET | Refills: 3 | Status: SHIPPED | OUTPATIENT
Start: 2022-12-12

## 2022-12-13 ENCOUNTER — OFFICE VISIT (OUTPATIENT)
Dept: ENDOCRINOLOGY | Facility: CLINIC | Age: 47
End: 2022-12-13

## 2022-12-13 ENCOUNTER — APPOINTMENT (OUTPATIENT)
Dept: LAB | Facility: CLINIC | Age: 47
End: 2022-12-13

## 2022-12-13 VITALS
SYSTOLIC BLOOD PRESSURE: 102 MMHG | WEIGHT: 197 LBS | HEART RATE: 75 BPM | HEIGHT: 70 IN | BODY MASS INDEX: 28.2 KG/M2 | DIASTOLIC BLOOD PRESSURE: 64 MMHG

## 2022-12-13 DIAGNOSIS — E11.59 TYPE 2 DIABETES MELLITUS WITH OTHER CIRCULATORY COMPLICATION, WITH LONG-TERM CURRENT USE OF INSULIN (HCC): ICD-10-CM

## 2022-12-13 DIAGNOSIS — Z79.4 TYPE 2 DIABETES MELLITUS WITH OTHER CIRCULATORY COMPLICATION, WITH LONG-TERM CURRENT USE OF INSULIN (HCC): ICD-10-CM

## 2022-12-13 DIAGNOSIS — I25.10 CORONARY ARTERY DISEASE INVOLVING NATIVE CORONARY ARTERY OF NATIVE HEART WITHOUT ANGINA PECTORIS: ICD-10-CM

## 2022-12-13 DIAGNOSIS — E11.9 TYPE 2 DIABETES MELLITUS WITHOUT COMPLICATION, WITHOUT LONG-TERM CURRENT USE OF INSULIN (HCC): ICD-10-CM

## 2022-12-13 DIAGNOSIS — E78.5 DYSLIPIDEMIA: ICD-10-CM

## 2022-12-13 DIAGNOSIS — E11.9 TYPE 2 DIABETES MELLITUS WITHOUT COMPLICATION, WITHOUT LONG-TERM CURRENT USE OF INSULIN (HCC): Primary | ICD-10-CM

## 2022-12-13 DIAGNOSIS — I10 HYPERTENSION, UNSPECIFIED TYPE: ICD-10-CM

## 2022-12-13 LAB
ALBUMIN SERPL BCP-MCNC: 4.5 G/DL (ref 3.5–5)
ALP SERPL-CCNC: 65 U/L (ref 46–116)
ALT SERPL W P-5'-P-CCNC: 62 U/L (ref 12–78)
ANION GAP SERPL CALCULATED.3IONS-SCNC: 4 MMOL/L (ref 4–13)
AST SERPL W P-5'-P-CCNC: 26 U/L (ref 5–45)
BILIRUB SERPL-MCNC: 0.4 MG/DL (ref 0.2–1)
BUN SERPL-MCNC: 30 MG/DL (ref 5–25)
CALCIUM SERPL-MCNC: 9.7 MG/DL (ref 8.3–10.1)
CHLORIDE SERPL-SCNC: 109 MMOL/L (ref 96–108)
CHOLEST SERPL-MCNC: 85 MG/DL
CO2 SERPL-SCNC: 24 MMOL/L (ref 21–32)
CREAT SERPL-MCNC: 1.33 MG/DL (ref 0.6–1.3)
CREAT UR-MCNC: 142 MG/DL
GFR SERPL CREATININE-BSD FRML MDRD: 62 ML/MIN/1.73SQ M
GLUCOSE P FAST SERPL-MCNC: 108 MG/DL (ref 65–99)
HDLC SERPL-MCNC: 30 MG/DL
LDLC SERPL CALC-MCNC: 37 MG/DL (ref 0–100)
MICROALBUMIN UR-MCNC: 11 MG/L (ref 0–20)
MICROALBUMIN/CREAT 24H UR: 8 MG/G CREATININE (ref 0–30)
POTASSIUM SERPL-SCNC: 4.3 MMOL/L (ref 3.5–5.3)
PROT SERPL-MCNC: 7.6 G/DL (ref 6.4–8.4)
SL AMB POCT HEMOGLOBIN AIC: 7.4 (ref ?–6.5)
SODIUM SERPL-SCNC: 137 MMOL/L (ref 135–147)
TRIGL SERPL-MCNC: 92 MG/DL

## 2022-12-13 NOTE — PROGRESS NOTES
Established Patient Progress Note      Chief Complaint   Patient presents with   • Diabetes Type 2        History of Present Illness:   Kingsley Jeffrey is a 49 y o  male with a history of type 2 diabetes without long term use of insulin since 2019, previously treated with insulin  Reports complications of no microvascular complications, but does have CAD  Denies recent illness or hospitalizations  Denies recent severe hypoglycemic or severe hyperglycemic episodes  Denies any issues with his current regimen  home glucose monitoring: are performed sporadically and reports blood sugars 130-160 range  No hypoglycemia  Ran out of Jardiance and metformin 3 days ago  Active at work and exercises  Has made dietary improvements  Hx pancreatitis, very rarely drinks alcohol (last in July)     Current regimen:   Jardiance 25mg daily   Metformin 500mg twice per day     Last Eye Exam: Due for exam, will go to [facility]  Last Foot Exam: today, no problems       Has hypertension: Taking lisinopril  Not taking toprol  Has hyperlipidemia: Taking atorvastatin and fenofirbate      Patient Active Problem List   Diagnosis   • Pancreatic pseudocyst   • Alcohol-induced chronic pancreatitis (HCC)   • Liver mass   • Generalized abdominal pain   • Hypertension   • Eosinophilic esophagitis   • Hypokalemia   • Coronary artery disease involving native coronary artery of native heart without angina pectoris   • Gastroesophageal reflux disease with esophagitis   • Type 2 diabetes mellitus with other circulatory complication, with long-term current use of insulin (HCC)   • Portal vein thrombosis   • Dyslipidemia      Past Medical History:   Diagnosis Date   • Diabetes mellitus ([address] )     pre-diabetic    • Hypertension    • Pancreatitis    • Portal vein thrombosis       Past Surgical History:   Procedure Laterality Date   • ABDOMINAL SURGERY     • CHOLECYSTECTOMY     • [place] EDG US EXAM SURGICAL ALTER STOM DUODENUM/JEJUNUM N/A 7/24/2017 Procedure: LINEAR ENDOSCOPIC U/S WITH AXIOS STENT;  Surgeon: Robinson Monique MD;  [facility]; Service: Gastroenterology   • [place] EDG US EXAM SURGICAL ALTER STOM DUODENUM/JEJUNUM N/A 9/14/2017    Procedure: LINEAR ENDOSCOPIC U/S POSSIBLE AXIOS;  Surgeon: Robinson Monique MD;  Location: [facility];   Service: Gastroenterology   • WISDOM TOOTH EXTRACTION      20 years ago      Family History   Adopted: Yes     Social History     Tobacco Use   • Smoking status: Never   • Smokeless tobacco: Never   Substance Use Topics   • Alcohol use: Yes     Comment: socially -rarely     Allergies   Allergen Reactions   • Amoxicillin Rash         Current Outpatient Medications:   •  aspirin 81 mg chewable tablet, Chew 1 tablet (81 mg total) daily, Disp: 90 each, Rfl: 0  •  atorvastatin (LIPITOR) 80 mg tablet, Take 1 tablet (80 mg total) by mouth daily, Disp: 90 tablet, Rfl: 3  •  Empagliflozin (Jardiance) 25 MG TABS, Take 1 tablet (25 mg total) by mouth every morning, Disp: 30 tablet, Rfl: 5  •  fenofibrate (TRICOR) 145 mg tablet, Take 1 tablet (145 mg total) by mouth daily, Disp: 90 tablet, Rfl: 3  •  glucose blood test strip, Check BG Daily (prodigy test strips) If not covered may sub Relion test strips If prodigy and relion test strips not covered let us know we can change meter/lancets/strips, Disp: 100 strip, Rfl: 1  •  lisinopril (ZESTRIL) 20 mg tablet, Take 1 tablet (20 mg total) by mouth daily, Disp: 90 tablet, Rfl: 3  •  metFORMIN (GLUCOPHAGE) 500 mg tablet, Take 2 tablets (1,000 mg total) by mouth 2 (two) times a day with meals, Disp: 360 tablet, Rfl: 1  •  Multiple Vitamins-Minerals (MULTIVITAMIN WITH MINERALS) tablet, Take 1 tablet by mouth daily, Disp: , Rfl:   •  Omega-3 Fatty Acids (fish oil) 1,000 mg, Take 1,000 mg by mouth, Disp: , Rfl:   •  metoprolol succinate (TOPROL-XL) 25 mg 24 hr tablet, Take 1 tablet by mouth once daily (Patient not taking: Reported on 12/6/2022), Disp: 90 tablet, Rfl: 0  •  nitroglycerin (NITROSTAT) 0 4 mg SL tablet, Place 1 tablet (0 4 mg total) under the tongue every 5 (five) minutes as needed for chest pain (Patient not taking: Reported on 9/9/2021), Disp: 20 tablet, Rfl: 0  •  Omega-3 Fatty Acids (FISH OIL) 1,000 mg, Take 1,000 mg by mouth daily (Patient not taking: Reported on 12/13/2022), Disp: , Rfl:   •  omeprazole (PriLOSEC) 20 mg delayed release capsule, Take 2 capsules (40 mg total) by mouth daily for 14 days (Patient not taking: Reported on 12/6/2022), Disp: 28 capsule, Rfl: 0    Review of Systems   Constitutional: Negative for activity change, appetite change and fatigue  HENT: Negative for sore throat, trouble swallowing and voice change  Eyes: Negative for visual disturbance  Respiratory: Negative for choking, chest tightness and shortness of breath  Cardiovascular: Negative for chest pain, palpitations and leg swelling  Gastrointestinal: Negative for abdominal pain, constipation and diarrhea  Endocrine: Negative for cold intolerance, heat intolerance, polydipsia, polyphagia and polyuria  Genitourinary: Negative for frequency  Musculoskeletal: Negative for arthralgias and myalgias  Skin: Negative for rash  Neurological: Negative for dizziness and syncope  Hematological: Negative for adenopathy  Psychiatric/Behavioral: Negative for sleep disturbance  All other systems reviewed and are negative  Physical Exam:  Body mass index is 28 27 kg/m²  /64 (BP Location: Left arm, Patient Position: Sitting, Cuff Size: Large)   Pulse 75   Ht 5' 10" (1 778 m)   Wt 89 4 kg (197 lb)   BMI 28 27 kg/m²    Wt Readings from Last 3 Encounters:   12/13/22 89 4 kg (197 lb)   12/06/22 90 3 kg (199 lb)   11/23/21 92 1 kg (203 lb)       Physical Exam  Vitals reviewed  Constitutional:       General: He is not in acute distress  Appearance: He is well-developed  HENT:      Head: Normocephalic and atraumatic     Eyes:      Conjunctiva/sclera: Conjunctivae normal  Pupils: Pupils are equal, round, and reactive to light  Neck:      Thyroid: No thyromegaly  Cardiovascular:      Rate and Rhythm: Normal rate and regular rhythm  Heart sounds: Normal heart sounds  No murmur heard  Pulmonary:      Effort: Pulmonary effort is normal  No respiratory distress  Breath sounds: Normal breath sounds  No wheezing or rales  Abdominal:      General: Bowel sounds are normal  There is no distension  Palpations: Abdomen is soft  Tenderness: There is no abdominal tenderness  Musculoskeletal:         General: Normal range of motion  Cervical back: Normal range of motion and neck supple  Lymphadenopathy:      Cervical: No cervical adenopathy  Skin:     General: Skin is warm and dry  Neurological:      Mental Status: He is alert and oriented to person, place, and time           Labs:   Lab Results   Component Value Date    HGBA1C 7 4 (A) 12/13/2022    HGBA1C 8 0 (A) 09/09/2021    HGBA1C 6 3 04/01/2021     Lab Results   Component Value Date    CREATININE 1 11 08/10/2022    CREATININE 0 97 12/21/2019    CREATININE 0 85 12/19/2019    BUN 22 08/10/2022     06/26/2015    K 4 1 08/10/2022     (H) 08/10/2022    CO2 23 08/10/2022     eGFR   Date Value Ref Range Status   08/10/2022 78 ml/min/1 73sq m Final     Lab Results   Component Value Date    CHOL 186 12/15/2014    HDL 37 (L) 12/18/2019    TRIG 841 (H) 12/18/2019     Lab Results   Component Value Date    ALT 64 08/10/2022    AST 30 08/10/2022    ALKPHOS 65 08/10/2022    BILITOT 0 26 06/26/2015     No results found for: [id number]  No results found for: FREET4, TSI    Impression & Plan:    Problem List Items Addressed This Visit        Endocrine    Type 2 diabetes mellitus with other circulatory complication, with long-term current use of insulin ([facility] [address] ) - Primary     Diabetes control is fair, but not goal   Discussed importance of obtaining A1C less than 7 or even better less than 6 5 to reduce risk of complications  Will avoid GLP-1 agonist due to history of Pancreatitis  Will increase Metformin to 1000mg twice per day and he also plans to return to regular exercise at the gym  Discussed possibility of side effects on higher metformin dose and he will contact the office if any problems  Check Blood sugar 1x per day and send log for review in two weeks for med adjustment if needed  Complete eye exam and send copy of report  Lab Results   Component Value Date    HGBA1C 7 4 (A) 12/13/2022            Relevant Medications    Empagliflozin (Jardiance) 25 MG TABS    metFORMIN (GLUCOPHAGE) 500 mg tablet    glucose blood test strip       Cardiovascular and Mediastinum    Hypertension     Well controlled on lisinopril  Other    Dyslipidemia     Continue atorvastatin  He has an order for lipid panel from his cardiologist- advised him to complete lab testing today after visit since he is currently fasting  Relevant Orders    Comprehensive metabolic panel       Orders Placed This Encounter   Procedures   • Comprehensive metabolic panel     This is a patient instruction: Patient fasting for 8 hours or longer recommended       Standing Status:   Future     Number of Occurrences:   1     Standing Expiration Date:   6/13/2023   • Microalbumin / creatinine urine ratio     Standing Status:   Future     Number of Occurrences:   1     Standing Expiration Date:   6/13/2023   • Ambulatory referral to Optometry     Standing Status:   Future     Standing Expiration Date:   12/13/2023     Referral Priority:   Routine     Referral Type:   Optometry     Referral Reason:   Specialty Services Required     Referred to Provider:   [facility]     Requested Specialty:   Optometry     Number of Visits Requested:   1     Expiration Date:   12/13/2023   • POCT hemoglobin A1c       Patient Instructions   Increase Metformin to 1000mg twice per day with food  Resume regular exercise  Check blood sugars 1x per day and send log in 2 weeks  Let us know if any problems getting test strips or side effects on higher metformin dose (Diarrhea, upset stomach, etc)  Complete eye exam and send copy of report  Discussed with the patient and all questioned fully answered  He will call me if any problems arise  Follow-up appointment in 3 months       Counseled patient on diagnostic results, prognosis, risk and benefit of treatment options, instruction for management, importance of treatment compliance, Risk  factor reduction and impressions    Sabrina Gates PA-C

## 2022-12-13 NOTE — ASSESSMENT & PLAN NOTE
Continue atorvastatin  He has an order for lipid panel from his cardiologist- advised him to complete lab testing today after visit since he is currently fasting

## 2022-12-13 NOTE — PATIENT INSTRUCTIONS
Increase Metformin to 1000mg twice per day with food  Resume regular exercise  Check blood sugars 1x per day and send log in 2 weeks  Let us know if any problems getting test strips or side effects on higher metformin dose (Diarrhea, upset stomach, etc)  Complete eye exam and send copy of report

## 2022-12-13 NOTE — ASSESSMENT & PLAN NOTE
Diabetes control is fair, but not goal   Discussed importance of obtaining A1C less than 7 or even better less than 6 5 to reduce risk of complications  Will avoid GLP-1 agonist due to history of Pancreatitis  Will increase Metformin to 1000mg twice per day and he also plans to return to regular exercise at the gym  Discussed possibility of side effects on higher metformin dose and he will contact the office if any problems  Check Blood sugar 1x per day and send log for review in two weeks for med adjustment if needed  Complete eye exam and send copy of report     Lab Results   Component Value Date    HGBA1C 7 4 (A) 12/13/2022

## 2022-12-20 DIAGNOSIS — Z79.4 TYPE 2 DIABETES MELLITUS WITH OTHER CIRCULATORY COMPLICATION, WITH LONG-TERM CURRENT USE OF INSULIN (HCC): Primary | ICD-10-CM

## 2022-12-20 DIAGNOSIS — E11.59 TYPE 2 DIABETES MELLITUS WITH OTHER CIRCULATORY COMPLICATION, WITH LONG-TERM CURRENT USE OF INSULIN (HCC): Primary | ICD-10-CM

## 2022-12-23 NOTE — ASSESSMENT & PLAN NOTE
BG improving per patient  No BG data (BG log/meter) provided at visit  Due to lack of data can not make any changes today  Continue current regimen for now  Instructed to check BG 4x per day and send in BG log in two weeks for review  Will make adjustments at that time  Advised on complications of uncontrolled diabetes including; retinopathy, neuropathy, nephropathy, heart attack, and stroke  Mr. Reaves is seen for follow up evaluation of severe acne of the face.   Patient has completed 4 weeks oral isotretinoin, 40 mg BID. 1 pill left at home. Pt reports acne flared this month with starting medication. Denies side effects other than dryness. Denies nosebleeds, dry eyes, muscle/joint aches, or mood changes.   Patient completed 2nd course of Accutane in January 2022. Pt had two course with over 150 mg/kg.   Denies other skin lesions of concern.  LOV 11/23/22.    PAST MEDICAL HISTORY:   Past medical history on electronic medical record reviewed and updated.    Depression or suicidal ideation- denies  Anorexia/eating disorder: denies    FAMILY HISTORY:  Family history on electronic medical record reviewed.    History of depression or suicide-Denies  Familial lipid disorder: Denies     MEDICATIONS:  Medications and allergies were reviewed.    Current Outpatient Medications   Medication Sig Dispense Refill   • ISOtretinoin 40 MG capsule Take 1 capsule by mouth 2 times daily with food. 60 capsule 0   • clindamycin (CLEOCIN T) 1 % pledgets Apply to the face twice daily 180 each 11   • ketoconazole (NIZORAL) 2 % shampoo Use to wash hair twice weekly as directed. 240 mL 0   • ofloxacin (OCUFLOX) 0.3 % ophthalmic solution Instill 1 drop into the left eye 4 times daily for 7 days. 5 mL 0   • ketoconazole (NIZORAL) 2 % shampoo Use twice weekly as needed. Apply to your eyebrows for 2-3 minutes before washing off. 120 mL 3   • mupirocin (BACTROBAN) 2 % ointment Apply twice daily to nostrils and cracks at corner of the lips for 5 days. Repeat if symptoms reappear. 22 g 3     No current facility-administered medications for this visit.     ALLERGIES:  No Known Allergies  SOCIAL HISTORY:    Drug abuse: Denies   Alcohol use:  Rare  Tobacco use: Denies    Drug Use:    Never             REVIEW OF SYSTEMS:    Patient denies fever, chills, tiredness, malaise.    Depression/suicidal thoughts: Denies  Myalgias:  Denies  Headaches: Denies  Nausea/vomiting/upset abdomen: Denies  Visual changes: Denies  Bone/back pain: Denies    PHYSICAL EXAMINATION:  The patient is a healthy appearing  male in no acute distress.  Skin examination includes: Sun exposed skin-head/face/scalp/neck/right arm/left arm/digits and/or nails (5).     Few open and closed comedones on the face.    10-15 inflammatory papules on the face.    1-5 deep inflammatory nodules on the face. Scarring is present & post-inflammatory hyperpigmentation on cheeks.  No other concerning skin lesions.    Cholesterol: 214  Triglycerides: 97  Liver function tests: Alk phos 127 (previously 136)    ASSESSMENT/PLAN  1.  Acne vulgaris;  Severe inflammatory and nodulocystic acne recalcitrant to oral antibiotics, topical retinoids and topical antibiotics.  Completed 2 course of Accutane with cumulative course of 171 mg/kg & 150 mg/kg.      -Recommend Isotretinoin orally.  Patient’s weight 184 pounds.  Starting Dose 40 mg by mouth twice daily to be taken with food.    5 month cumulative goal 12,545 mg. Plan to increase to 60 BID last 2 months of treatment.  4 weeks, 2400 mg    -iPledge program booklet provided.  Patient will review and return to clinic in the near future with questions.    -Side effects of Accutane including, but not limited to, dry skin, dry lips, alterations in LFTs/lipids, myalgia/arthralgias, depression, suicidal ideation and pseudotumor cerebri, pancreatitis, liver dysfunction, inflammatory bowel disease, allergic reactions, corneal changes were discussed.  He understands and accepts.    Advised to avoid extra Vitamin A supplements, do not share medication with anyone.    -Baseline laboratory values reviewed and discussed with patient.  Liver function tests, lipid profile, CBC.    -Patient notified to report to lab before future appointments for blood work.  -Patient is aware he is not to share his medication with any females.  It is his job to keep  track of his medication and keep it from easy access to others.   -Patient aware of not donating blood while on this medication and for one month after discharge.  -Patient aware they are not to take tetracycline family of medications while on Accutane.  -Patient will report any depressive or self-harm symptoms to Dr. Mojica immediately.  They are also to report to the nearest emergency room for evaluation.   -Prescription not written until all of the labs and informed consent obtained on [email or web address].    -Recommend follow up appointment in 1 month, virtual as needed.    The patient was seen and examined by Dr. Mojica in the presence of Evelia Payton NP.  This progress note was in part created by Evelia Payton NP acting as a scribe for Lola Mojica MD.     I, Lola Mojica M.D.,  attest that the documentation recorded by the scribe accurately and completely reflects the service(s) I personally performed and the decisions made by me.

## 2023-03-28 ENCOUNTER — TELEPHONE (OUTPATIENT)
Dept: ENDOCRINOLOGY | Facility: CLINIC | Age: 48
End: 2023-03-28

## 2023-03-28 NOTE — TELEPHONE ENCOUNTER
Received a fax from Pt prescription plan, they would like to know if Jardiance 25 mg can be switch to Steglatro 5 mg or 15 mg , this medication will be cheaper for pt, Please advise       I did call pt ,madi message requesting a call back

## 2023-03-29 NOTE — TELEPHONE ENCOUNTER
Patient called back and he would like to switch to [facility] if it will be cheaper for him

## 2023-03-30 DIAGNOSIS — E11.59 TYPE 2 DIABETES MELLITUS WITH OTHER CIRCULATORY COMPLICATION, WITH LONG-TERM CURRENT USE OF INSULIN (HCC): Primary | ICD-10-CM

## 2023-03-30 DIAGNOSIS — Z79.4 TYPE 2 DIABETES MELLITUS WITH OTHER CIRCULATORY COMPLICATION, WITH LONG-TERM CURRENT USE OF INSULIN (HCC): Primary | ICD-10-CM

## 2023-03-30 RX ORDER — ERTUGLIFLOZIN 15 MG/1
TABLET, FILM COATED ORAL
Qty: 30 TABLET | Refills: 4 | Status: SHIPPED | OUTPATIENT
Start: 2023-03-30

## 2023-05-27 DIAGNOSIS — E11.9 TYPE 2 DIABETES MELLITUS WITHOUT COMPLICATION, WITHOUT LONG-TERM CURRENT USE OF INSULIN (HCC): ICD-10-CM

## 2023-05-30 NOTE — TELEPHONE ENCOUNTER
90-day supply of Metformin 500 mg tablets sent to patient's pharmacy  No additional refills until he is seen in the office

## 2023-07-27 DIAGNOSIS — Z79.4 TYPE 2 DIABETES MELLITUS WITH OTHER CIRCULATORY COMPLICATION, WITH LONG-TERM CURRENT USE OF INSULIN (HCC): Primary | ICD-10-CM

## 2023-07-27 DIAGNOSIS — E11.59 TYPE 2 DIABETES MELLITUS WITH OTHER CIRCULATORY COMPLICATION, WITH LONG-TERM CURRENT USE OF INSULIN (HCC): Primary | ICD-10-CM

## 2023-09-18 ENCOUNTER — OFFICE VISIT (OUTPATIENT)
Dept: ENDOCRINOLOGY | Facility: CLINIC | Age: 48
End: 2023-09-18
Payer: COMMERCIAL

## 2023-09-18 VITALS
WEIGHT: 198.6 LBS | OXYGEN SATURATION: 98 % | HEART RATE: 71 BPM | BODY MASS INDEX: 28.43 KG/M2 | HEIGHT: 70 IN | SYSTOLIC BLOOD PRESSURE: 110 MMHG | DIASTOLIC BLOOD PRESSURE: 70 MMHG

## 2023-09-18 DIAGNOSIS — E11.59 TYPE 2 DIABETES MELLITUS WITH OTHER CIRCULATORY COMPLICATION, WITH LONG-TERM CURRENT USE OF INSULIN (HCC): Primary | ICD-10-CM

## 2023-09-18 DIAGNOSIS — E78.5 DYSLIPIDEMIA: ICD-10-CM

## 2023-09-18 DIAGNOSIS — K86.0 ALCOHOL-INDUCED CHRONIC PANCREATITIS (HCC): ICD-10-CM

## 2023-09-18 DIAGNOSIS — E11.9 TYPE 2 DIABETES MELLITUS WITHOUT COMPLICATION, WITHOUT LONG-TERM CURRENT USE OF INSULIN (HCC): ICD-10-CM

## 2023-09-18 DIAGNOSIS — Z79.4 TYPE 2 DIABETES MELLITUS WITH OTHER CIRCULATORY COMPLICATION, WITH LONG-TERM CURRENT USE OF INSULIN (HCC): Primary | ICD-10-CM

## 2023-09-18 LAB — SL AMB POCT HEMOGLOBIN AIC: 7.1 (ref ?–6.5)

## 2023-09-18 PROCEDURE — 99214 OFFICE O/P EST MOD 30 MIN: CPT | Performed by: PHYSICIAN ASSISTANT

## 2023-09-18 PROCEDURE — 83036 HEMOGLOBIN GLYCOSYLATED A1C: CPT | Performed by: PHYSICIAN ASSISTANT

## 2023-09-18 NOTE — ASSESSMENT & PLAN NOTE
Will avoid GLP-1 agonists due to history of pancreatitis. He does reporting drinking alcohol a few drinks at a time, a few times per year. Advised him to completely avoid alcohol due to risk of recurrent pancreatis.

## 2023-09-18 NOTE — ASSESSMENT & PLAN NOTE
Diabetes control is improving, but not at goal.  Continue current regimen and lifestyle modifications  Complete lab testing as ordered. Suggest that he establish regular care with his PCP, Dr. Sharonda Burdick,  for management of Diabetes if A1C continues to improve.    Lab Results   Component Value Date    HGBA1C 7.1 (A) 09/18/2023

## 2023-09-18 NOTE — PROGRESS NOTES
Established Patient Progress Note      Chief Complaint   Patient presents with   • Diabetes Type 2        Impression & Plan:    Problem List Items Addressed This Visit        Digestive    Alcohol-induced chronic pancreatitis ([address])     Will avoid GLP-1 agonists due to history of pancreatitis. He does reporting drinking alcohol a few drinks at a time, a few times per year. Advised him to completely avoid alcohol due to risk of recurrent pancreatis. Endocrine    Type 2 diabetes mellitus with other circulatory complication, with long-term current use of insulin ([address]) - Primary     Diabetes control is improving, but not at goal.  Continue current regimen and lifestyle modifications  Complete lab testing as ordered. Suggest that he establish regular care with his PCP, Dr. Ricky Brantley,  for management of Diabetes if A1C continues to improve. Lab Results   Component Value Date    HGBA1C 7.1 (A) 09/18/2023            Relevant Medications    Empagliflozin 25 MG TABS    metFORMIN (GLUCOPHAGE) 500 mg tablet    Other Relevant Orders    POCT hemoglobin A1c (Completed)       Other    Dyslipidemia     Continue current regimen and complete lab testing as ordered. Relevant Orders    Lipid Panel with Direct LDL reflex    TSH, 3rd generation    T4, free   Other Visit Diagnoses     Type 2 diabetes mellitus without complication, without long-term current use of insulin (HCC)        Relevant Medications    Empagliflozin 25 MG TABS    metFORMIN (GLUCOPHAGE) 500 mg tablet    Other Relevant Orders    Comprehensive metabolic panel    Albumin / creatinine urine ratio    Lipid Panel with Direct LDL reflex    TSH, 3rd generation    T4, free          Orders Placed This Encounter   Procedures   • Comprehensive metabolic panel     This is a patient instruction: Patient fasting for 8 hours or longer recommended.      Standing Status:   Future     Standing Expiration Date:   3/18/2024   • Albumin / creatinine urine ratio Standing Status:   Future     Standing Expiration Date:   3/18/2024   • Lipid Panel with Direct LDL reflex     This is a patient instruction: This test requires patient fasting for 10-12 hours or longer. Drinking of black coffee or black tea is acceptable. Standing Status:   Future     Standing Expiration Date:   3/18/2024   • TSH, 3rd generation     This is a patient instruction: This test is non-fasting. Please drink two glasses of water morning of bloodwork. Standing Status:   Future     Standing Expiration Date:   3/18/2024   • T4, free     Standing Status:   Future     Standing Expiration Date:   3/18/2024   • POCT hemoglobin A1c       History of Present Illness:   Kristin Parish is a 51 y.o. male with a history of type 2 diabetes without long term use of insulin since 2019. Reports complications of history of CAD. Denies recent illness or hospitalizations. Denies recent severe hypoglycemic or severe hyperglycemic episodes. Denies any issues with his current regimen. home glucose monitoring: are performed sporadically and blood sugar was 130-140. Biking 20 mins per day and lifting weights and he has been eating better, except past few week he has had a friends 50th Birthday celebrations/mulitple BBQ  Has been avoiding soda completely   Very active at work at [employer]- on feet all day.    He does have history of pancreatitis related to alcohol  Reports rarely drinking alcohol (Few drinks on labor day, probably wont drink again until new years)    Current regimen:   Jardiance 25mg daily   Metformin 1000mg bID    Last Eye Exam: Due next month  Last Foot Exam: Today     Has hypertension: Taking lisinopril  Has hyperlipidemia: Taking atorvastatin and fenofibrate       Patient Active Problem List   Diagnosis   • Pancreatic pseudocyst   • Alcohol-induced chronic pancreatitis ([address])   • Liver mass   • Generalized abdominal pain   • Hypertension   • Eosinophilic esophagitis   • Hypokalemia   • Coronary artery disease involving native coronary artery of native heart without angina pectoris   • Gastroesophageal reflux disease with esophagitis   • Type 2 diabetes mellitus with other circulatory complication, with long-term current use of insulin (HCC)   • Portal vein thrombosis   • Dyslipidemia      Past Medical History:   Diagnosis Date   • Diabetes mellitus ([address])     pre-diabetic    • Hypertension    • Pancreatitis    • Portal vein thrombosis       Past Surgical History:   Procedure Laterality Date   • ABDOMINAL SURGERY     • CHOLECYSTECTOMY     • CA EDG US EXAM SURGICAL ALTER STOM DUODENUM/JEJUNUM N/A 7/24/2017    Procedure: LINEAR ENDOSCOPIC U/S WITH AXIOS STENT;  Surgeon: Vernon Trejo MD;  Location: [facility]; Service: Gastroenterology   • CA EDG US EXAM SURGICAL ALTER STOM DUODENUM/JEJUNUM N/A 9/14/2017    Procedure: LINEAR ENDOSCOPIC U/S POSSIBLE AXIOS;  Surgeon: Vernon Trejo MD;  Location: [facility];   Service: Gastroenterology   • WISDOM TOOTH EXTRACTION      20 years ago      Family History   Adopted: Yes     Social History     Tobacco Use   • Smoking status: Never   • Smokeless tobacco: Never   Substance Use Topics   • Alcohol use: Yes     Comment: socially -rarely     Allergies   Allergen Reactions   • Amoxicillin Rash         Current Outpatient Medications:   •  atorvastatin (LIPITOR) 80 mg tablet, Take 1 tablet (80 mg total) by mouth daily, Disp: 90 tablet, Rfl: 3  •  Empagliflozin 25 MG TABS, Take 1 tablet (25 mg total) by mouth every morning, Disp: 30 tablet, Rfl: 5  •  fenofibrate (TRICOR) 145 mg tablet, Take 1 tablet (145 mg total) by mouth daily, Disp: 90 tablet, Rfl: 3  •  glucose blood test strip, Check BG Daily (prodigy test strips) If not covered may sub Relion test strips If prodigy and relion test strips not covered let us know we can change meter/lancets/strips, Disp: 100 strip, Rfl: 1  •  lisinopril (ZESTRIL) 20 mg tablet, Take 1 tablet (20 mg total) by mouth daily, Disp: 90 tablet, Rfl: 3  • metFORMIN (GLUCOPHAGE) 500 mg tablet, Take 2 tablets (1,000 mg total) by mouth 2 (two) times a day with meals, Disp: 360 tablet, Rfl: 1  •  Multiple Vitamins-Minerals (MULTIVITAMIN WITH MINERALS) tablet, Take 1 tablet by mouth daily, Disp: , Rfl:   •  Omega-3 Fatty Acids (fish oil) 1,000 mg, Take 1,000 mg by mouth, Disp: , Rfl:   •  aspirin 81 mg chewable tablet, Chew 1 tablet (81 mg total) daily, Disp: 90 each, Rfl: 0  •  Omega-3 Fatty Acids (FISH OIL) 1,000 mg, Take 1,000 mg by mouth daily (Patient not taking: Reported on 9/18/2023), Disp: , Rfl:     Review of Systems   Constitutional: Negative for activity change, appetite change and fatigue. HENT: Negative for sore throat, trouble swallowing and voice change. Eyes: Negative for visual disturbance. Respiratory: Negative for choking, chest tightness and shortness of breath. Cardiovascular: Negative for chest pain, palpitations and leg swelling. Gastrointestinal: Negative for abdominal pain, constipation and diarrhea. Endocrine: Negative for cold intolerance, heat intolerance, polydipsia, polyphagia and polyuria. Genitourinary: Negative for frequency. Musculoskeletal: Negative for arthralgias and myalgias. Skin: Negative for rash. Neurological: Negative for dizziness and syncope. Hematological: Negative for adenopathy. Psychiatric/Behavioral: Negative for sleep disturbance. All other systems reviewed and are negative. Physical Exam:  Body mass index is 28.5 kg/m². /70 (BP Location: Left arm, Patient Position: Sitting, Cuff Size: Adult)   Pulse 71   Ht 5' 10" (1.778 m)   Wt 90.1 kg (198 lb 9.6 oz)   SpO2 98%   BMI 28.50 kg/m²    Wt Readings from Last 3 Encounters:   09/18/23 90.1 kg (198 lb 9.6 oz)   12/13/22 89.4 kg (197 lb)   12/06/22 90.3 kg (199 lb)       Physical Exam  Vitals reviewed. Constitutional:       General: He is not in acute distress. Appearance: He is well-developed.    HENT:      Head: Normocephalic and atraumatic. Eyes:      Conjunctiva/sclera: Conjunctivae normal.      Pupils: Pupils are equal, round, and reactive to light. Neck:      Thyroid: No thyromegaly. Cardiovascular:      Rate and Rhythm: Normal rate and regular rhythm. Pulses: no weak pulses          Dorsalis pedis pulses are 2+ on the right side and 2+ on the left side. Posterior tibial pulses are 2+ on the right side and 2+ on the left side. Heart sounds: Normal heart sounds. No murmur heard. Pulmonary:      Effort: Pulmonary effort is normal. No respiratory distress. Breath sounds: Normal breath sounds. No wheezing or rales. Abdominal:      General: Bowel sounds are normal. There is no distension. Palpations: Abdomen is soft. Tenderness: There is no abdominal tenderness. Musculoskeletal:         General: Normal range of motion. Cervical back: Normal range of motion and neck supple. Feet:      Right foot:      Skin integrity: Callus present. No ulcer, skin breakdown, erythema, warmth or dry skin. Left foot:      Skin integrity: Callus present. No ulcer, skin breakdown, erythema, warmth or dry skin. Lymphadenopathy:      Cervical: No cervical adenopathy. Skin:     General: Skin is warm and dry. Neurological:      Mental Status: He is alert and oriented to person, place, and time. Patient's shoes and socks removed. Right Foot/Ankle   Right Foot Inspection  Skin Exam: skin normal, skin intact, callus and callus. No dry skin, no warmth, no erythema, no maceration, no abnormal color, no pre-ulcer and no ulcer. Toe Exam: ROM and strength within normal limits. No swelling, no tenderness, erythema and  no right toe deformity    Sensory   Monofilament testing: intact    Vascular  Capillary refills: < 3 seconds  The right DP pulse is 2+. The right PT pulse is 2+. Left Foot/Ankle  Left Foot Inspection  Skin Exam: skin normal, skin intact and callus.  No dry skin, no warmth, no erythema, no maceration, normal color, no pre-ulcer and no ulcer. Toe Exam: ROM and strength within normal limits. No swelling, no tenderness, no erythema and no left toe deformity. Sensory   Monofilament testing: intact    Vascular  Capillary refills: < 3 seconds  The left DP pulse is 2+. The left PT pulse is 2+. Assign Risk Category  No deformity present  No loss of protective sensation  No weak pulses  Risk: 0      Labs:   Lab Results   Component Value Date    HGBA1C 7.1 (A) 09/18/2023    HGBA1C 7.4 (A) 12/13/2022    HGBA1C 8.0 (A) 09/09/2021     Lab Results   Component Value Date    CREATININE 1.33 (H) 12/13/2022    CREATININE 1.11 08/10/2022    CREATININE 0.97 12/21/2019    BUN 30 (H) 12/13/2022     06/26/2015    K 4.3 12/13/2022     (H) 12/13/2022    CO2 24 12/13/2022     eGFR   Date Value Ref Range Status   12/13/2022 62 ml/min/1.73sq m Final     Lab Results   Component Value Date    CHOL 186 12/15/2014    HDL 30 (L) 12/13/2022    TRIG 92 12/13/2022     Lab Results   Component Value Date    ALT 62 12/13/2022    AST 26 12/13/2022    ALKPHOS 65 12/13/2022    BILITOT 0.26 06/26/2015     No results found for: "[id number]"  No results found for: "FREET4", "TSI"          Discussed with the patient and all questioned fully answered. He will call me if any problems arise. Follow-up appointment in 6 months. Counseled patient on diagnostic results, prognosis, risk and benefit of treatment options, instruction for management, importance of treatment compliance, Risk  factor reduction and impressions    There are no Patient Instructions on file for this visit.     Alfonso Acevedo PA-C

## 2024-02-21 DIAGNOSIS — I21.4 NSTEMI (NON-ST ELEVATED MYOCARDIAL INFARCTION) (HCC): ICD-10-CM

## 2024-02-26 RX ORDER — ATORVASTATIN CALCIUM 80 MG/1
80 TABLET, FILM COATED ORAL DAILY
Qty: 90 TABLET | Refills: 3 | Status: SHIPPED | OUTPATIENT
Start: 2024-02-26

## 2024-02-26 RX ORDER — FENOFIBRATE 145 MG/1
145 TABLET, COATED ORAL DAILY
Qty: 90 TABLET | Refills: 3 | Status: SHIPPED | OUTPATIENT
Start: 2024-02-26

## 2024-02-26 RX ORDER — LISINOPRIL 20 MG/1
20 TABLET ORAL DAILY
Qty: 90 TABLET | Refills: 3 | Status: SHIPPED | OUTPATIENT
Start: 2024-02-26

## 2024-03-14 DIAGNOSIS — Z79.4 TYPE 2 DIABETES MELLITUS WITH OTHER CIRCULATORY COMPLICATION, WITH LONG-TERM CURRENT USE OF INSULIN (HCC): ICD-10-CM

## 2024-03-14 DIAGNOSIS — E11.9 TYPE 2 DIABETES MELLITUS WITHOUT COMPLICATION, WITHOUT LONG-TERM CURRENT USE OF INSULIN (HCC): ICD-10-CM

## 2024-03-14 DIAGNOSIS — E11.59 TYPE 2 DIABETES MELLITUS WITH OTHER CIRCULATORY COMPLICATION, WITH LONG-TERM CURRENT USE OF INSULIN (HCC): ICD-10-CM

## 2024-03-14 RX ORDER — EMPAGLIFLOZIN 25 MG/1
25 TABLET, FILM COATED ORAL EVERY MORNING
Qty: 30 TABLET | Refills: 5 | Status: SHIPPED | OUTPATIENT
Start: 2024-03-14

## 2024-03-25 ENCOUNTER — LAB (OUTPATIENT)
Dept: LAB | Facility: AMBULARY SURGERY CENTER | Age: 49
End: 2024-03-25
Payer: COMMERCIAL

## 2024-03-25 ENCOUNTER — OFFICE VISIT (OUTPATIENT)
Dept: ENDOCRINOLOGY | Facility: CLINIC | Age: 49
End: 2024-03-25
Payer: COMMERCIAL

## 2024-03-25 VITALS
SYSTOLIC BLOOD PRESSURE: 120 MMHG | OXYGEN SATURATION: 97 % | HEIGHT: 70 IN | DIASTOLIC BLOOD PRESSURE: 88 MMHG | WEIGHT: 197 LBS | HEART RATE: 76 BPM | BODY MASS INDEX: 28.2 KG/M2

## 2024-03-25 DIAGNOSIS — I10 HYPERTENSION, UNSPECIFIED TYPE: ICD-10-CM

## 2024-03-25 DIAGNOSIS — Z79.4 TYPE 2 DIABETES MELLITUS WITH OTHER CIRCULATORY COMPLICATION, WITH LONG-TERM CURRENT USE OF INSULIN (HCC): ICD-10-CM

## 2024-03-25 DIAGNOSIS — K86.0 ALCOHOL-INDUCED CHRONIC PANCREATITIS (HCC): ICD-10-CM

## 2024-03-25 DIAGNOSIS — E78.5 DYSLIPIDEMIA: ICD-10-CM

## 2024-03-25 DIAGNOSIS — E11.59 TYPE 2 DIABETES MELLITUS WITH OTHER CIRCULATORY COMPLICATION, WITH LONG-TERM CURRENT USE OF INSULIN (HCC): ICD-10-CM

## 2024-03-25 DIAGNOSIS — Z79.4 TYPE 2 DIABETES MELLITUS WITH OTHER CIRCULATORY COMPLICATION, WITH LONG-TERM CURRENT USE OF INSULIN (HCC): Primary | ICD-10-CM

## 2024-03-25 DIAGNOSIS — E11.59 TYPE 2 DIABETES MELLITUS WITH OTHER CIRCULATORY COMPLICATION, WITH LONG-TERM CURRENT USE OF INSULIN (HCC): Primary | ICD-10-CM

## 2024-03-25 DIAGNOSIS — I25.10 CORONARY ARTERY DISEASE INVOLVING NATIVE CORONARY ARTERY OF NATIVE HEART WITHOUT ANGINA PECTORIS: ICD-10-CM

## 2024-03-25 LAB
ALBUMIN SERPL BCP-MCNC: 4.7 G/DL (ref 3.5–5)
ALP SERPL-CCNC: 48 U/L (ref 34–104)
ALT SERPL W P-5'-P-CCNC: 43 U/L (ref 7–52)
ANION GAP SERPL CALCULATED.3IONS-SCNC: 10 MMOL/L (ref 4–13)
AST SERPL W P-5'-P-CCNC: 28 U/L (ref 13–39)
BILIRUB SERPL-MCNC: 0.66 MG/DL (ref 0.2–1)
BUN SERPL-MCNC: 23 MG/DL (ref 5–25)
CALCIUM SERPL-MCNC: 9.3 MG/DL (ref 8.4–10.2)
CHLORIDE SERPL-SCNC: 107 MMOL/L (ref 96–108)
CO2 SERPL-SCNC: 22 MMOL/L (ref 21–32)
CREAT SERPL-MCNC: 0.93 MG/DL (ref 0.6–1.3)
CREAT UR-MCNC: 83.3 MG/DL
GFR SERPL CREATININE-BSD FRML MDRD: 95 ML/MIN/1.73SQ M
GLUCOSE P FAST SERPL-MCNC: 134 MG/DL (ref 65–99)
MICROALBUMIN UR-MCNC: <7 MG/L
MICROALBUMIN/CREAT 24H UR: <8 MG/G CREATININE (ref 0–30)
POTASSIUM SERPL-SCNC: 3.9 MMOL/L (ref 3.5–5.3)
PROT SERPL-MCNC: 6.7 G/DL (ref 6.4–8.4)
SL AMB POCT HEMOGLOBIN AIC: 8.3 (ref ?–6.5)
SODIUM SERPL-SCNC: 139 MMOL/L (ref 135–147)

## 2024-03-25 PROCEDURE — 36415 COLL VENOUS BLD VENIPUNCTURE: CPT

## 2024-03-25 PROCEDURE — 82043 UR ALBUMIN QUANTITATIVE: CPT

## 2024-03-25 PROCEDURE — 82570 ASSAY OF URINE CREATININE: CPT

## 2024-03-25 PROCEDURE — 99214 OFFICE O/P EST MOD 30 MIN: CPT | Performed by: INTERNAL MEDICINE

## 2024-03-25 PROCEDURE — 80053 COMPREHEN METABOLIC PANEL: CPT

## 2024-03-25 PROCEDURE — 83036 HEMOGLOBIN GLYCOSYLATED A1C: CPT | Performed by: INTERNAL MEDICINE

## 2024-03-25 NOTE — PATIENT INSTRUCTIONS
Hypoglycemia instructions   Lester Whitman  3/25/2024  [id number]    Low Blood Sugar    Steps to treat low blood sugar.    1. Test blood sugar if you have symptoms of low blood sugar:   Low Blood Sugar Symptoms:  o Sweaty  o Dizzy  o Rapid heartbeat  o Shaky    o Bad mood  o Hungry      2. Treat blood sugar less than 70 with 15 grams of fast-acting carbohydrate:   Examples of 15 grams Fast-Acting Carbohydrate:  o 4 oz juice  o 4 oz regular soda  o 3-4 glucose tablets (chew)  o 3-4 hard candies (chew)              3.   Wait 15 minutes and test your blood sugar again           4. If blood sugar is less than 100, repeat steps 2-3.      5. When your blood sugar is 100 or more, eat a snack if it will be longer than one hour until your next meal. The snack should be 15 grams of carbohydrate and a protein:   Examples of snacks:  o ½ sandwich  o 6 crackers with cheese  o Piece of fruit with cheese or peanut butter  o 6 crackers with peanut butter

## 2024-03-25 NOTE — PROGRESS NOTES
Lester Whitman 49 y.o. male MRN: [id number]    Encounter: [id number]      Assessment/Plan     Assessment:  This is a 49 y.o.-year-old male with diabetes with hyperglycemia.    Plan:    Diagnoses and all orders for this visit:    Type 2 diabetes mellitus with other circulatory complication, with long-term current use of insulin (HCC)  Lab Results   Component Value Date    HGBA1C 8.3 (A) 03/25/2024   A1c is 8.3%, uncontrolled.  Continue metformin 1000 mg twice a day, Jardiance 25 mg daily  Discussed to keep carbohydrate consistent with meals, stressed the importance of exercise routine.  So discussed to check blood sugar before breakfast and before dinner and send log in 2 weeks for review to make further adjustment.  Also patient did not have any lab work since December 2022, will obtain blood work including comprehensive metabolic profile and urine microalbumin creatinine ratio.  Because the importance of keeping himself well-hydrated drink 8 ounces of water 8 times daily.      -     POCT hemoglobin A1c  -     Comprehensive metabolic panel; Future  -     Albumin / creatinine urine ratio; Future  -     Basic metabolic panel; Future  -     Hemoglobin A1C; Future    Dyslipidemia  Currently managed by cardiology, on statins.  Lab Results   Component Value Date    LDLCALC 37 12/13/2022      Hypertension, unspecified type  Blood pressure is well-controlled, continue current management as per PCP  Alcohol-induced chronic pancreatitis (HCC)  Not a candidate for glucagon like peptide therapy.    Coronary artery disease involving native coronary artery of native heart without angina pectoris  Followed by cardiology       CC:     Type 2 diabetes, hypertension, hyperlipidemia,, pancreatitis    History of Present Illness     HPI:    Lester Whitman is a 49-year-old male with medical history of type 2 diabetes with complications such as coronary artery disease is here for follow-up.  He denies recent illness or hospitalizations.   He denies recent severe hypoglycemic or hyperglycemic episodes requiring hospitalization.  His blood sugars sporadically and his blood sugars usually in the 100 to 180 mg per DL range  He did not bring glucometer or glucose log today.  He exercises regularly 3 to 4 days a week.  Follows carbohydrate controlled diet      Current regimen includes Jardiance 25 mg daily, metformin 1000 mg twice a day  A1c improved to 7.1%    Patient is followed by ophthalmology regularly and foot doctor regularly  Pretension is taking lisinopril, for hyperlipidemia he takes atorvastatin and fenofibrate, denies side effects  Component      Latest Ref Rng 12/13/2022 9/18/2023   Sodium      135 - 147 mmol/L 137     Potassium      3.5 - 5.3 mmol/L 4.3     Chloride      96 - 108 mmol/L 109 (H)     Carbon Dioxide      21 - 32 mmol/L 24     ANION GAP      4 - 13 mmol/L 4     BUN      5 - 25 mg/dL 30 (H)     Creatinine      0.60 - 1.30 mg/dL 1.33 (H)     GLUCOSE, FASTING      65 - 99 mg/dL 108 (H)     Calcium      8.3 - 10.1 mg/dL 9.7     AST      5 - 45 U/L 26     ALT      12 - 78 U/L 62     ALK PHOS      46 - 116 U/L 65     Total Protein      6.4 - 8.4 g/dL 7.6     Albumin      3.5 - 5.0 g/dL 4.5     Total Bilirubin      0.20 - 1.00 mg/dL 0.40     GFR, Calculated      ml/min/1.73sq m 62     Cholesterol      See Comment mg/dL 85     Triglycerides      See Comment mg/dL 92     HDL      >=40 mg/dL 30 (L)     LDL Calculated      0 - 100 mg/dL 37     EXT Creatinine Urine      mg/dL 142.0     Albumin,U,Random      0.0 - 20.0 mg/L 11.0     Albumin Creat Ratio      0 - 30 mg/g creatinine 8     Hemoglobin A1C      6.5  7.4 !  7.1 !           Review of Systems   Constitutional:  Negative for activity change, diaphoresis, fatigue, fever and unexpected weight change.   HENT: Negative.     Eyes:  Negative for visual disturbance.   Respiratory:  Negative for cough, chest tightness and shortness of breath.    Cardiovascular:  Negative for chest pain,  palpitations and leg swelling.   Gastrointestinal:  Negative for abdominal pain, blood in stool, constipation, diarrhea, nausea and vomiting.   Endocrine: Negative for cold intolerance, heat intolerance, polydipsia, polyphagia and polyuria.   Genitourinary:  Negative for dysuria, enuresis, frequency and urgency.   Musculoskeletal:  Negative for arthralgias and myalgias.   Skin:  Negative for pallor, rash and wound.   Allergic/Immunologic: Negative.    Neurological:  Negative for dizziness, tremors, weakness and numbness.   Hematological: Negative.    Psychiatric/Behavioral: Negative.         Historical Information   Past Medical History:   Diagnosis Date    Diabetes mellitus (HCC)     pre-diabetic     Hypertension     Pancreatitis     Portal vein thrombosis      Past Surgical History:   Procedure Laterality Date    ABDOMINAL SURGERY      CHOLECYSTECTOMY      SC EDG US EXAM SURGICAL ALTER STOM DUODENUM/JEJUNUM N/A 7/24/2017    Procedure: LINEAR ENDOSCOPIC U/S WITH AXIOS STENT;  Surgeon: Tate Sanchez MD;  Location: [facility];  Service: Gastroenterology    SC EDG US EXAM SURGICAL ALTER STOM DUODENUM/JEJUNUM N/A 9/14/2017    Procedure: LINEAR ENDOSCOPIC U/S POSSIBLE AXIOS;  Surgeon: Tate Sanchez MD;  Location: [facility];  Service: Gastroenterology    WISDOM TOOTH EXTRACTION      20 years ago     Social History   Social History     Substance and Sexual Activity   Alcohol Use Yes    Comment: socially -rarely     Social History     Substance and Sexual Activity   Drug Use No     Social History     Tobacco Use   Smoking Status Never   Smokeless Tobacco Never     Family History:   Family History   Adopted: Yes       Meds/Allergies   Current Outpatient Medications   Medication Sig Dispense Refill    aspirin 81 mg chewable tablet Chew 1 tablet (81 mg total) daily 90 each 0    atorvastatin (LIPITOR) 80 mg tablet Take 1 tablet (80 mg total) by mouth daily 90 tablet 3    Empagliflozin (Jardiance) 25 MG TABS TAKE 1 TABLET BY MOUTH  "ONCE DAILY IN THE MORNING 30 tablet 5    fenofibrate (TRICOR) 145 mg tablet Take 1 tablet (145 mg total) by mouth daily 90 tablet 3    glucose blood test strip Check BG Daily (prodigy test strips) If not covered may sub Relion test strips If prodigy and relion test strips not covered let us know we can change meter/lancets/strips 100 strip 1    lisinopril (ZESTRIL) 20 mg tablet Take 1 tablet (20 mg total) by mouth daily 90 tablet 3    metFORMIN (GLUCOPHAGE) 500 mg tablet TAKE 2 TABLETS BY MOUTH TWICE DAILY WITH MEALS 360 tablet 0    Multiple Vitamins-Minerals (MULTIVITAMIN WITH MINERALS) tablet Take 1 tablet by mouth daily      Omega-3 Fatty Acids (fish oil) 1,000 mg Take 1,000 mg by mouth      Omega-3 Fatty Acids (FISH OIL) 1,000 mg Take 1,000 mg by mouth daily (Patient not taking: Reported on 9/18/2023)       No current facility-administered medications for this visit.     Allergies   Allergen Reactions    Amoxicillin Rash       Objective   Vitals: Blood pressure 120/88, pulse 76, height 5' 10\" (1.778 m), weight 89.4 kg (197 lb), SpO2 97%.    Physical Exam  Constitutional:       General: He is not in acute distress.     Appearance: Normal appearance. He is not ill-appearing.   HENT:      Head: Normocephalic and atraumatic.      Nose: Nose normal.   Eyes:      Extraocular Movements: Extraocular movements intact.      Conjunctiva/sclera: Conjunctivae normal.   Pulmonary:      Effort: No respiratory distress.   Musculoskeletal:      Cervical back: Normal range of motion and neck supple.   Neurological:      General: No focal deficit present.      Mental Status: He is alert and oriented to person, place, and time.   Psychiatric:         Mood and Affect: Mood normal.         Behavior: Behavior normal.         The history was obtained from the review of the chart, patient.    Lab Results:        Imaging Studies:       I have personally reviewed pertinent reports.      Portions of the record may have been created with " "voice recognition software. Occasional wrong word or \"sound a like\" substitutions may have occurred due to the inherent limitations of voice recognition software. Read the chart carefully and recognize, using context, where substitutions have occurred.    "

## 2024-03-26 ENCOUNTER — TELEPHONE (OUTPATIENT)
Dept: ENDOCRINOLOGY | Facility: CLINIC | Age: 49
End: 2024-03-26

## 2024-03-26 NOTE — RESULT ENCOUNTER NOTE
Hi,  Your blood work results are essentially normal, continue current management   Please send glucose log in 2 weeks to review and make adjustment as necessary

## 2024-03-26 NOTE — TELEPHONE ENCOUNTER
----- Message from Jesenia Cardoza MD sent at 3/26/2024  9:17 AM EDT -----  Hi,  Your blood work results are essentially normal, continue current management   Please send glucose log in 2 weeks to review and make adjustment as necessary

## 2024-04-02 ENCOUNTER — OFFICE VISIT (OUTPATIENT)
Dept: CARDIOLOGY CLINIC | Facility: CLINIC | Age: 49
End: 2024-04-02
Payer: COMMERCIAL

## 2024-04-02 VITALS
HEART RATE: 80 BPM | TEMPERATURE: 97.8 F | WEIGHT: 192 LBS | HEIGHT: 70 IN | SYSTOLIC BLOOD PRESSURE: 112 MMHG | RESPIRATION RATE: 16 BRPM | BODY MASS INDEX: 27.49 KG/M2 | OXYGEN SATURATION: 97 % | DIASTOLIC BLOOD PRESSURE: 68 MMHG

## 2024-04-02 DIAGNOSIS — I21.4 NSTEMI (NON-ST ELEVATED MYOCARDIAL INFARCTION) (HCC): ICD-10-CM

## 2024-04-02 DIAGNOSIS — E78.5 DYSLIPIDEMIA: ICD-10-CM

## 2024-04-02 DIAGNOSIS — I10 PRIMARY HYPERTENSION: ICD-10-CM

## 2024-04-02 DIAGNOSIS — I25.10 CORONARY ARTERY DISEASE INVOLVING NATIVE CORONARY ARTERY OF NATIVE HEART WITHOUT ANGINA PECTORIS: Primary | ICD-10-CM

## 2024-04-02 PROCEDURE — 99214 OFFICE O/P EST MOD 30 MIN: CPT | Performed by: INTERNAL MEDICINE

## 2024-04-02 PROCEDURE — 93000 ELECTROCARDIOGRAM COMPLETE: CPT | Performed by: INTERNAL MEDICINE

## 2024-04-02 RX ORDER — FENOFIBRATE 145 MG/1
145 TABLET, COATED ORAL DAILY
Qty: 90 TABLET | Refills: 3 | Status: SHIPPED | OUTPATIENT
Start: 2024-04-02

## 2024-04-02 NOTE — PROGRESS NOTES
Cardiology Follow Up    Lester Whitman  1975  [id number]  [facility] [address] [phone]    1. Coronary artery disease involving native coronary artery of native heart without angina pectoris  POCT ECG    Lipid Panel with Direct LDL reflex      2. Primary hypertension        3. Dyslipidemia            Discussion/Summary:    49-year-old man with a history of a non ST-elevation MI involving the LAD in December of 2019.    Ejection fraction was mildly reduced but most recently in November 2021 showed preserved LV function  Has hypertension, diabetes, dyslipidemia.        Continue 81 mg of aspirin. Blood pressure is under good control currently with lisinopril 20 mg.    Last lipid panel showed good control with 80 mg of atorvastatin and fenofibrate. He needs repeat lipid panel which I ordered and can be done with his next blood work.    Diabetes management per PCP and endocrinologist. He is on Jardiance. If there gaps in his insurance coverage, this may be the most expensive medication for him. Advised to review with them if there are changes.    His other medications should be inexpensive and generic. If he needs to make any adjustments, then that would be fine he can contact me and I can find cheaper alternatives.        History of Present Illness:     49-year-old man.    History of coronary disease with a non ST-elevation MI in December of 2019.    Stent to the LAD.  Otherwise, 50% disease in an OM 1.    Ejection fraction 45% on echocardiogram initially, then normalized.    Has hypertension, diabetes.  Triglycerides were significantly elevated, therefore in addition to high-intensity atorvastatin, he has been on fenofibrate.    History of alcoholic pancreatitis.      Interval history:    No issues from a cardiac standpoint. Comes for regular follow-up a little overdue. No changes in the last year, except he  unfortunately just lost his job today though he is planning on trying to contest this.    He was working out regularly at the gym without any symptoms of angina. No repeat cardiac testing is been necessary. His EKG is unremarkable today.    Last hemoglobin A1c was a little bit elevated. He is making a better effort towards diet.    We reviewed his medications. Jardiance would be the most expensive if there was a lapse in his insurance, but he does have his coverage through the end of this month, at least. He will be looking for other employment, also.    Medical Problems       Problem List       Pancreatic pseudocyst    Alcohol-induced chronic pancreatitis (HCC)    Liver mass    Generalized abdominal pain    Overview Signed 12/18/2019  4:57 AM by Deacon Reno DO     Procedure/Onset: 10/16/2009         Eosinophilic esophagitis    Hypokalemia    Gastroesophageal reflux disease with esophagitis    Type 2 diabetes mellitus without complication, without long-term current use of insulin (HCC)      Lab Results   Component Value Date    HGBA1C 8.3 (A) 03/25/2024         Portal vein thrombosis    Dyslipidemia    Coronary artery disease involving native coronary artery of native heart without angina pectoris                  Past Medical History:   Diagnosis Date    Diabetes mellitus (HCC)     pre-diabetic     Hypertension     Pancreatitis     Portal vein thrombosis      Social History     Tobacco Use    Smoking status: Never    Smokeless tobacco: Never   Vaping Use    Vaping status: Never Used   Substance Use Topics    Alcohol use: Yes     Comment: socially -rarely    Drug use: No      Family History   Adopted: Yes     Past Surgical History:   Procedure Laterality Date    ABDOMINAL SURGERY      CHOLECYSTECTOMY      IN EDG US EXAM SURGICAL ALTER STOM DUODENUM/JEJUNUM N/A 7/24/2017    Procedure: LINEAR ENDOSCOPIC U/S WITH AXIOS STENT;  Surgeon: Tate Sanchez MD;  Location: [facility];  Service: Gastroenterology    IN EDG US  "EXAM SURGICAL ALTER STOM DUODENUM/JEJUNUM N/A 9/14/2017    Procedure: LINEAR ENDOSCOPIC U/S POSSIBLE AXIOS;  Surgeon: Tate Sanchez MD;  Location: [facility];  Service: Gastroenterology    WISDOM TOOTH EXTRACTION      20 years ago       Current Outpatient Medications:     atorvastatin (LIPITOR) 80 mg tablet, Take 1 tablet (80 mg total) by mouth daily, Disp: 90 tablet, Rfl: 3    Empagliflozin (Jardiance) 25 MG TABS, TAKE 1 TABLET BY MOUTH ONCE DAILY IN THE MORNING, Disp: 30 tablet, Rfl: 5    glucose blood test strip, Check BG Daily (prodigy test strips) If not covered may sub Relion test strips If prodigy and relion test strips not covered let us know we can change meter/lancets/strips, Disp: 100 strip, Rfl: 1    lisinopril (ZESTRIL) 20 mg tablet, Take 1 tablet (20 mg total) by mouth daily, Disp: 90 tablet, Rfl: 3    metFORMIN (GLUCOPHAGE) 500 mg tablet, TAKE 2 TABLETS BY MOUTH TWICE DAILY WITH MEALS, Disp: 360 tablet, Rfl: 0    Multiple Vitamins-Minerals (MULTIVITAMIN WITH MINERALS) tablet, Take 1 tablet by mouth daily, Disp: , Rfl:     Omega-3 Fatty Acids (fish oil) 1,000 mg, Take 1,000 mg by mouth, Disp: , Rfl:     aspirin 81 mg chewable tablet, Chew 1 tablet (81 mg total) daily, Disp: 90 each, Rfl: 0    fenofibrate (TRICOR) 145 mg tablet, Take 1 tablet (145 mg total) by mouth daily (Patient not taking: Reported on 4/2/2024), Disp: 90 tablet, Rfl: 3    Omega-3 Fatty Acids (FISH OIL) 1,000 mg, Take 1,000 mg by mouth daily (Patient not taking: Reported on 9/18/2023), Disp: , Rfl:   Allergies   Allergen Reactions    Amoxicillin Rash       Vitals:    04/02/24 1254   BP: 112/68   BP Location: Left arm   Patient Position: Sitting   Pulse: 80   Resp: 16   Temp: 97.8 °F (36.6 °C)   TempSrc: Tympanic   SpO2: 97%   Weight: 87.1 kg (192 lb)   Height: 5' 10\" (1.778 m)     Vitals:    04/02/24 1254   Weight: 87.1 kg (192 lb)      Height: 5' 10\" (177.8 cm)   Body mass index is 27.55 kg/m².    Physical Exam:  GEN: Lester Whitman " appears well, alert and oriented x 3, pleasant and cooperative   HEENT: pupils equal, round, and reactive to light; extraocular muscles intact  NECK: supple, no carotid bruits   HEART: regular rhythm, normal S1 and S2, no murmurs, clicks, gallops or rubs   LUNGS: clear to auscultation bilaterally; no wheezes, rales, or rhonchi   ABDOMEN: normal bowel sounds, soft, no tenderness, no distention  EXTREMITIES: peripheral pulses normal; no clubbing, cyanosis, or edema  NEURO: no focal findings   SKIN: normal without suspicious lesions on exposed skin      ROS:  Except as noted in HPI, is otherwise reviewed in detail and a 12 point review of systems is negative.  ROS reviewed and is unchanged    Labs:  Lab Results   Component Value Date    SODIUM 139 03/25/2024    K 3.9 03/25/2024     03/25/2024    CREATININE 0.93 03/25/2024    BUN 23 03/25/2024    CO2 22 03/25/2024    ALT 43 03/25/2024    AST 28 03/25/2024    INR 0.96 12/18/2019    GLUF 134 (H) 03/25/2024    HGBA1C 8.3 (A) 03/25/2024    WBC 5.55 08/10/2022    HGB 15.5 08/10/2022    HCT 47.7 08/10/2022     08/10/2022       Lab Results   Component Value Date    CHOL 186 12/15/2014     Lab Results   Component Value Date    LDLCALC 37 12/13/2022    LDLCALC  12/18/2019      Comment:      Calculated LDL invalid, triglycerides >400 mg/dl    LDLCALC 114 (H) 12/15/2014     Lab Results   Component Value Date    HDL 30 (L) 12/13/2022    HDL 37 (L) 12/18/2019    HDL 46 12/15/2014     Lab Results   Component Value Date    TRIG 92 12/13/2022    TRIG 841 (H) 12/18/2019    TRIG 129 12/15/2014       Testing:  Echo 11/23/2021:   Left Ventricle: Left ventricular cavity size is normal. The left ventricular ejection fraction is 60%. Systolic function is normal. Global longitudinal strain is mildly reduced at -17%. Diastolic function is normal.    The following segments are hypokinetic: basal inferolateral and mid inferolateral.    All other segments are normal.      Echo  12/18/19:  LEFT VENTRICLE:  Systolic function was mildly reduced. Ejection fraction was estimated to be 45 %.  There was dyskinesis of the mid-distal anteroseptal wall(s).  There was hypokinesis of the mid anteroseptal, mid inferoseptal, apical inferior, apical septal, and apical wall(s).  Doppler parameters were consistent with abnormal left ventricular relaxation (grade 1 diastolic dysfunction).  There was a false tendon within the ventricle, towards the apex.     RIGHT VENTRICLE:  The size was normal.  Systolic function was normal.     MITRAL VALVE:  There was trace regurgitation.     PULMONIC VALVE:  There was trace regurgitation.     IVC, HEPATIC VEINS:  Respirophasic changes were blunted (less than 50% variation).    Cardiac Cath 12/18/19:  CORONARY CIRCULATION:  Mid LAD: There was a discrete 99 % stenosis. There was GM grade 2 flow through the vessel (partial perfusion). This lesion is a likely culprit for the patient's recent myocardial infarction. An intervention was performed.  1st obtuse marginal: There was a discrete 50 % stenosis. It appears amenable to percutaneous intervention.     1ST LESION INTERVENTIONS:  A successful balloon angioplasty with stent procedure was performed on the 99 % lesion in the mid LAD. Following intervention there was an excellent angiographic appearance with a 0 % residual stenosis.  A Orsiro Rx 4.0 x 18mm drug-eluting stent was placed across the lesion and deployed at a maximum inflation pressure of 9 aris.    EKG:  Sinus rhythm, 80 BPM. Normal EKG

## 2024-06-06 DIAGNOSIS — I21.4 NSTEMI (NON-ST ELEVATED MYOCARDIAL INFARCTION) (HCC): ICD-10-CM

## 2024-06-06 DIAGNOSIS — E11.9 TYPE 2 DIABETES MELLITUS WITHOUT COMPLICATION, WITHOUT LONG-TERM CURRENT USE OF INSULIN (HCC): ICD-10-CM

## 2024-06-07 ENCOUNTER — TELEPHONE (OUTPATIENT)
Age: 49
End: 2024-06-07

## 2024-06-07 RX ORDER — LISINOPRIL 20 MG/1
20 TABLET ORAL DAILY
Qty: 90 TABLET | Refills: 1 | Status: SHIPPED | OUTPATIENT
Start: 2024-06-07

## 2024-06-07 RX ORDER — ATORVASTATIN CALCIUM 80 MG/1
80 TABLET, FILM COATED ORAL DAILY
Qty: 30 TABLET | Refills: 0 | Status: SHIPPED | OUTPATIENT
Start: 2024-06-07

## 2024-06-07 RX ORDER — FENOFIBRATE 145 MG/1
145 TABLET, COATED ORAL DAILY
Qty: 30 TABLET | Refills: 0 | Status: SHIPPED | OUTPATIENT
Start: 2024-06-07

## 2024-06-07 NOTE — TELEPHONE ENCOUNTER
Caller: Lester Whitman    Doctor: Kishor    Reason for call: Pt would like to change pharmacy to [facility] on [facility] in [place]. He also would like a call back about having to have bloodwork because he says he just had bloodwork in April. And he says that on his med bottles it states he has 3 refills for 90 days each of the atorvistatin, lisinopril, fenofibrate.    Call back#: [phone]

## 2024-07-07 DIAGNOSIS — I21.4 NSTEMI (NON-ST ELEVATED MYOCARDIAL INFARCTION) (HCC): ICD-10-CM

## 2024-07-08 RX ORDER — ATORVASTATIN CALCIUM 80 MG/1
80 TABLET, FILM COATED ORAL DAILY
Qty: 90 TABLET | Refills: 3 | Status: SHIPPED | OUTPATIENT
Start: 2024-07-08

## 2024-07-19 DIAGNOSIS — I21.4 NSTEMI (NON-ST ELEVATED MYOCARDIAL INFARCTION) (HCC): ICD-10-CM

## 2024-07-19 DIAGNOSIS — Z79.4 TYPE 2 DIABETES MELLITUS WITH OTHER CIRCULATORY COMPLICATION, WITH LONG-TERM CURRENT USE OF INSULIN (HCC): ICD-10-CM

## 2024-07-19 DIAGNOSIS — E11.59 TYPE 2 DIABETES MELLITUS WITH OTHER CIRCULATORY COMPLICATION, WITH LONG-TERM CURRENT USE OF INSULIN (HCC): ICD-10-CM

## 2024-07-19 RX ORDER — FENOFIBRATE 145 MG/1
145 TABLET, COATED ORAL DAILY
Qty: 100 TABLET | Refills: 1 | Status: SHIPPED | OUTPATIENT
Start: 2024-07-19

## 2024-07-19 NOTE — TELEPHONE ENCOUNTER
Reason for call:   [x] Refill   [] Prior Auth  [] Other:     Office:   [] PCP/Provider -   [x] Specialty/Provider - Kelton Iverson     Medication: fenofibrate (TRICOR) 145 mg tablet     Dose/Frequency: 145 mg, Oral, Daily     Quantity: 100    Pharmacy: [facility]/pharmacy #[id number] - [place], [place] - [address]     Does the patient have enough for 3 days?   [] Yes   [x] No - Send as HP to POD

## 2024-07-19 NOTE — TELEPHONE ENCOUNTER
Change in pharmacy , patient recently did not have insurance. He is completely out.  Reason for call:   [x] Refill   [] Prior Auth  [] Other:     Office:   [] PCP/Provider -   [x] Specialty/Provider - Keeley Pitt     Medication:   Empagliflozin (Jardiance) 25 MG TABS     Dose/Frequency: TAKE 1 TABLET BY MOUTH ONCE DAILY IN THE MORNING     Quantity: 100    Pharmacy: [facility]/PHARMACY #[id number] - [place], [place] - [address] [[id number]]     Does the patient have enough for 3 days?   [] Yes   [x] No - Send as HP to POD

## 2024-12-25 DIAGNOSIS — I21.4 NSTEMI (NON-ST ELEVATED MYOCARDIAL INFARCTION) (HCC): ICD-10-CM

## 2024-12-26 RX ORDER — LISINOPRIL 20 MG/1
20 TABLET ORAL DAILY
Qty: 90 TABLET | Refills: 1 | Status: SHIPPED | OUTPATIENT
Start: 2024-12-26

## 2025-01-08 DIAGNOSIS — I21.4 NSTEMI (NON-ST ELEVATED MYOCARDIAL INFARCTION) (HCC): ICD-10-CM

## 2025-01-08 NOTE — TELEPHONE ENCOUNTER
Reason for call: NOT A DUPLICATE sent to incorrect pod  [x] Refill   [] Prior Auth  [] Other:     Office:   [] PCP/Provider -   [x] Specialty/Provider -CARDIOLOGY      Medication: fenofibrate (TRICOR) 145 mg tablet     Dose/Frequency: Take 1 tablet (145 mg total) by mouth daily,     Quantity: 100    Pharmacy: [facility]/pharmacy #[id number] - [place], [place] - [address] [phone]    Does the patient have enough for 3 days?   [x] Yes   [] No - Send as HP to POD

## 2025-01-09 RX ORDER — FENOFIBRATE 145 MG/1
145 TABLET, COATED ORAL DAILY
Qty: 30 TABLET | Refills: 0 | Status: SHIPPED | OUTPATIENT
Start: 2025-01-09

## 2025-03-03 ENCOUNTER — TELEPHONE (OUTPATIENT)
Dept: ENDOCRINOLOGY | Facility: CLINIC | Age: 50
End: 2025-03-03

## 2025-03-04 DIAGNOSIS — I21.4 NSTEMI (NON-ST ELEVATED MYOCARDIAL INFARCTION) (HCC): ICD-10-CM

## 2025-03-04 DIAGNOSIS — E11.9 TYPE 2 DIABETES MELLITUS WITHOUT COMPLICATION, WITHOUT LONG-TERM CURRENT USE OF INSULIN (HCC): ICD-10-CM

## 2025-03-04 NOTE — TELEPHONE ENCOUNTER
Not a duplicate  Pt needs a 90 day supply sent to pharmacy    Reason for call:   [x] Refill   [] Prior Auth  [] Other:     Office:   [] PCP/Provider -   [x] Specialty/Provider - Cardio    Medication: fenofibrate (TRICOR) 145 mg tablet     Dose/Frequency: 145 mg, Oral, Daily     Quantity: 90    Pharmacy: [facility]/pharmacy #[id number] - [place], [place] - [address]      Does the patient have enough for 3 days?   [x] Yes   [] No - Send as HP to POD

## 2025-03-07 RX ORDER — FENOFIBRATE 145 MG/1
145 TABLET, COATED ORAL DAILY
Qty: 30 TABLET | Refills: 0 | Status: SHIPPED | OUTPATIENT
Start: 2025-03-07

## 2025-03-10 ENCOUNTER — TELEPHONE (OUTPATIENT)
Dept: ENDOCRINOLOGY | Facility: CLINIC | Age: 50
End: 2025-03-10

## 2025-04-02 DIAGNOSIS — I21.4 NSTEMI (NON-ST ELEVATED MYOCARDIAL INFARCTION) (HCC): ICD-10-CM

## 2025-04-02 RX ORDER — FENOFIBRATE 145 MG/1
145 TABLET, COATED ORAL DAILY
Qty: 90 TABLET | Refills: 0 | Status: SHIPPED | OUTPATIENT
Start: 2025-04-02

## 2025-07-01 ENCOUNTER — OFFICE VISIT (OUTPATIENT)
Dept: CARDIOLOGY CLINIC | Facility: CLINIC | Age: 50
End: 2025-07-01
Payer: COMMERCIAL

## 2025-07-01 VITALS
DIASTOLIC BLOOD PRESSURE: 72 MMHG | BODY MASS INDEX: 27.49 KG/M2 | SYSTOLIC BLOOD PRESSURE: 114 MMHG | HEIGHT: 70 IN | OXYGEN SATURATION: 97 % | HEART RATE: 74 BPM | TEMPERATURE: 97.7 F | WEIGHT: 192 LBS

## 2025-07-01 DIAGNOSIS — E78.5 DYSLIPIDEMIA: ICD-10-CM

## 2025-07-01 DIAGNOSIS — I10 PRIMARY HYPERTENSION: ICD-10-CM

## 2025-07-01 DIAGNOSIS — I25.10 CORONARY ARTERY DISEASE INVOLVING NATIVE CORONARY ARTERY OF NATIVE HEART WITHOUT ANGINA PECTORIS: ICD-10-CM

## 2025-07-01 PROCEDURE — 99214 OFFICE O/P EST MOD 30 MIN: CPT

## 2025-07-01 PROCEDURE — 93000 ELECTROCARDIOGRAM COMPLETE: CPT

## 2025-07-01 NOTE — PROGRESS NOTES
Lester Whitman  1975  [id number]  [facility] [address] [phone]    1. Coronary artery disease involving native coronary artery of native heart without angina pectoris  POCT ECG      2. Primary hypertension        3. Dyslipidemia            Summary/Discussion:  Coronary artery disease:  - with prior NSTEMI s/p MARY to the mLAD in 12/2019  50% disease in an OM1  - LVEF 45% by echo in 12/2019 which has since normalized   - currently without symptoms of angina. Works out regularly at the gym without any significant cardiac limitations  - no repeat cardiac testing has been necessary  - EKG today demonstrating sinus rhythm without any acute ischemic changes   - not on beta blockers   - continue aspirin, statin and fenofibrate     Hypertension:  - controlled  - continue present medication regimen  - lifestyle modification   - close blood pressure monitoring     Dyslipidemia:  - continue atorvastatin 80 mg daily  - he is due for routine labs which can be ordered by his PCP  - encouraged low cholesterol, mediterranean diet, and annual lipid follow up    Interval History: Lester Whitman is a 50 y.o. year old male with history mentioned in problem who presents to the office today for routine follow up.     Today, he reports overall feeling well from a cardiac standpoint. He denies any chest pain/pressure/discomfort or shortness of breath. He denies lower extremity edema, orthopnea, and PND. He denies lightheadedness, dizziness, and syncope. He denies palpitations. Denies any recent change in his functional capacity and continues to work out regularly at the time.     No further cardiac testing indicated at this time.     He will RTO in 1 year with Dr. Iverson or sooner if necessary. He will call with any concerns.       Medical Problems       Problem List       Pancreatic pseudocyst    Alcohol-induced chronic pancreatitis (HCC)    Liver mass     Generalized abdominal pain    Overview Signed 12/18/2019  4:57 AM by Deacon Reno DO   Procedure/Onset: 10/16/2009         Primary hypertension    Overview Signed 12/18/2019  4:57 AM by Deacon Reno DO   Overview:   Last Assessment & Plan:   · Well controlled  · Cont amlodipine    Last Assessment & Plan:   Not at goal, increase amlodipine to 10mg daily and return in 4 weeks for blood pressure check nurse visit.         Eosinophilic esophagitis    Hypokalemia    Coronary artery disease involving native coronary artery of native heart without angina pectoris    Gastroesophageal reflux disease with esophagitis    Type 2 diabetes mellitus with other circulatory complication, with long-term current use of insulin ([facility])      Lab Results   Component Value Date    HGBA1C 8.3 (A) 03/25/2024         Portal vein thrombosis    Dyslipidemia        Past Medical History[1]  Social History     Socioeconomic History    Marital status: Single     Spouse name: Not on file    Number of children: Not on file    Years of education: Not on file    Highest education level: Not on file   Occupational History    Not on file   Tobacco Use    Smoking status: Never    Smokeless tobacco: Never   Vaping Use    Vaping status: Never Used   Substance and Sexual Activity    Alcohol use: Yes     Comment: socially -rarely    Drug use: No    Sexual activity: Yes     Partners: Female   Other Topics Concern    Not on file   Social History Narrative    Not on file     Social Drivers of Health     Financial Resource Strain: Not on file   Food Insecurity: Not on file   Transportation Needs: Not on file   Physical Activity: Not on file   Stress: Not on file   Social Connections: Not on file   Intimate Partner Violence: Not on file   Housing Stability: Not on file      Family History[2]  Past Surgical History[3]  Current Medications[4]  Allergies   Allergen Reactions    Amoxicillin Rash       Labs:     Chemistry        Component Value Date/Time    NA  "140 06/26/2015 2106    K 3.9 03/25/2024 0931    K 3.8 04/25/2018 1107     03/25/2024 0931     04/25/2018 1107    CO2 22 03/25/2024 0931    CO2 26 04/25/2018 1107    BUN 23 03/25/2024 0931    BUN 14 04/25/2018 1107    CREATININE 0.93 03/25/2024 0931    CREATININE 0.97 04/25/2018 1107        Component Value Date/Time    CALCIUM 9.3 03/25/2024 0931    CALCIUM 9.3 04/25/2018 1107    ALKPHOS 48 03/25/2024 0931    ALKPHOS 121 (H) 04/25/2018 1107    AST 28 03/25/2024 0931    AST 23 04/25/2018 1107    ALT 43 03/25/2024 0931    ALT 62 (H) 04/25/2018 1107    BILITOT 0.26 06/26/2015 2106            Lab Results   Component Value Date    CHOL 186 12/15/2014     Lab Results   Component Value Date    HDL 30 (L) 12/13/2022    HDL 37 (L) 12/18/2019    HDL 46 12/15/2014     Lab Results   Component Value Date    LDLCALC 37 12/13/2022    LDLCALC  12/18/2019      Comment:      Calculated LDL invalid, triglycerides >400 mg/dl    LDLCALC 114 (H) 12/15/2014     Lab Results   Component Value Date    TRIG 92 12/13/2022    TRIG 841 (H) 12/18/2019    TRIG 129 12/15/2014     No results found for: \"CHOLHDL\"    Imaging: No results found.    ECG:  sinus rhythm     Review of Systems   All other systems reviewed and are negative.      Vitals:    07/01/25 1141   BP: 114/72   Pulse: 74   Temp: 97.7 °F (36.5 °C)   SpO2: 97%     Vitals:    07/01/25 1141   Weight: 87.1 kg (192 lb)     Height: 5' 10\" (177.8 cm)   Body mass index is 27.55 kg/m².    Physical Exam  Vitals and nursing note reviewed.   Constitutional:       General: He is not in acute distress.     Appearance: Normal appearance. He is not ill-appearing.   HENT:      Head: Normocephalic.      Nose: Nose normal.      Mouth/Throat:      Mouth: Mucous membranes are moist.      Pharynx: Oropharynx is clear.     Cardiovascular:      Rate and Rhythm: Normal rate and regular rhythm.      Pulses: Normal pulses.      Heart sounds: Normal heart sounds. No murmur heard.  Pulmonary:      " Effort: Pulmonary effort is normal.      Breath sounds: Normal breath sounds.     Musculoskeletal:         General: Normal range of motion.      Cervical back: Normal range of motion.      Right lower leg: No edema.      Left lower leg: No edema.     Skin:     General: Skin is warm and dry.     Neurological:      Mental Status: He is alert and oriented to person, place, and time.     Psychiatric:         Mood and Affect: Mood normal.         Behavior: Behavior normal.             [1]   Past Medical History:  Diagnosis Date    Diabetes mellitus (HCC)     pre-diabetic     Hypertension     Pancreatitis     Portal vein thrombosis    [2]   Family History  Adopted: Yes   [3]   Past Surgical History:  Procedure Laterality Date    ABDOMINAL SURGERY      CHOLECYSTECTOMY      MI EDG US EXAM SURGICAL ALTER STOM DUODENUM/JEJUNUM N/A 7/24/2017    Procedure: LINEAR ENDOSCOPIC U/S WITH AXIOS STENT;  Surgeon: Tate Sanchez MD;  Location: [facility];  Service: Gastroenterology    MI EDG US EXAM SURGICAL ALTER STOM DUODENUM/JEJUNUM N/A 9/14/2017    Procedure: LINEAR ENDOSCOPIC U/S POSSIBLE AXIOS;  Surgeon: Tate Sanchez MD;  Location: [facility];  Service: Gastroenterology    WISDOM TOOTH EXTRACTION      20 years ago   [4]   Current Outpatient Medications:     aspirin 81 mg chewable tablet, Chew 1 tablet (81 mg total) daily, Disp: 90 each, Rfl: 0    atorvastatin (LIPITOR) 80 mg tablet, TAKE 1 TABLET BY MOUTH EVERY DAY, Disp: 90 tablet, Rfl: 3    fenofibrate (TRICOR) 145 mg tablet, TAKE 1 TABLET BY MOUTH EVERY DAY, Disp: 90 tablet, Rfl: 0    glucose blood test strip, Check BG Daily (prodigy test strips) If not covered may sub Relion test strips If prodigy and relion test strips not covered let us know we can change meter/lancets/strips, Disp: 100 strip, Rfl: 1    Jardiance 25 MG TABS, TAKE 1 TABLET BY MOUTH EVERY DAY IN THE MORNING, Disp: 90 tablet, Rfl: 1    lisinopril (ZESTRIL) 20 mg tablet, TAKE 1 TABLET BY MOUTH EVERY DAY, Disp: 90  tablet, Rfl: 1    metFORMIN (GLUCOPHAGE) 500 mg tablet, Take 2 tablets (1,000 mg total) by mouth 2 (two) times a day with meals, Disp: 360 tablet, Rfl: 0    Multiple Vitamins-Minerals (MULTIVITAMIN WITH MINERALS) tablet, Take 1 tablet by mouth in the morning., Disp: , Rfl:     Omega-3 Fatty Acids (fish oil) 1,000 mg, Take 1,000 mg by mouth, Disp: , Rfl:

## 2025-07-03 DIAGNOSIS — I21.4 NSTEMI (NON-ST ELEVATED MYOCARDIAL INFARCTION) (HCC): ICD-10-CM

## 2025-07-03 RX ORDER — LISINOPRIL 20 MG/1
20 TABLET ORAL DAILY
Qty: 90 TABLET | Refills: 3 | Status: SHIPPED | OUTPATIENT
Start: 2025-07-03

## 2025-07-04 DIAGNOSIS — I21.4 NSTEMI (NON-ST ELEVATED MYOCARDIAL INFARCTION) (HCC): ICD-10-CM

## 2025-07-07 DIAGNOSIS — E78.5 DYSLIPIDEMIA: Primary | ICD-10-CM

## 2025-07-07 RX ORDER — FENOFIBRATE 145 MG/1
145 TABLET, FILM COATED ORAL DAILY
Qty: 90 TABLET | Refills: 0 | Status: SHIPPED | OUTPATIENT
Start: 2025-07-07

## 2025-07-13 DIAGNOSIS — I21.4 NSTEMI (NON-ST ELEVATED MYOCARDIAL INFARCTION) (HCC): ICD-10-CM

## 2025-07-14 RX ORDER — ATORVASTATIN CALCIUM 80 MG/1
80 TABLET, FILM COATED ORAL DAILY
Qty: 30 TABLET | Refills: 0 | Status: SHIPPED | OUTPATIENT
Start: 2025-07-14

## 2025-07-18 DIAGNOSIS — E11.9 TYPE 2 DIABETES MELLITUS WITHOUT COMPLICATION, WITHOUT LONG-TERM CURRENT USE OF INSULIN (HCC): ICD-10-CM

## (undated) DEVICE — ESOPHAGEAL/PYLORIC/COLONIC/BILIARY WIREGUIDED BALLOON DILATATION CATHETER: Brand: CRE™ PRO